# Patient Record
Sex: FEMALE | Race: BLACK OR AFRICAN AMERICAN | NOT HISPANIC OR LATINO | Employment: FULL TIME | URBAN - METROPOLITAN AREA
[De-identification: names, ages, dates, MRNs, and addresses within clinical notes are randomized per-mention and may not be internally consistent; named-entity substitution may affect disease eponyms.]

---

## 2017-12-18 RX ORDER — SPIRONOLACTONE 25 MG/1
TABLET ORAL
Qty: 90 TABLET | Refills: 3 | OUTPATIENT
Start: 2017-12-18

## 2017-12-18 RX ORDER — CARVEDILOL 25 MG/1
TABLET ORAL
Qty: 180 TABLET | Refills: 3 | OUTPATIENT
Start: 2017-12-18

## 2018-03-08 ENCOUNTER — OFFICE VISIT (OUTPATIENT)
Dept: FAMILY MEDICINE CLINIC | Facility: CLINIC | Age: 53
End: 2018-03-08

## 2018-03-08 VITALS
HEIGHT: 64 IN | DIASTOLIC BLOOD PRESSURE: 70 MMHG | WEIGHT: 201 LBS | TEMPERATURE: 98.2 F | HEART RATE: 77 BPM | BODY MASS INDEX: 34.31 KG/M2 | OXYGEN SATURATION: 100 % | SYSTOLIC BLOOD PRESSURE: 120 MMHG

## 2018-03-08 DIAGNOSIS — Z79.899 HIGH RISK MEDICATION USE: Primary | ICD-10-CM

## 2018-03-08 DIAGNOSIS — E66.9 OBESITY (BMI 30-39.9): ICD-10-CM

## 2018-03-08 DIAGNOSIS — D64.9 ANEMIA, UNSPECIFIED TYPE: ICD-10-CM

## 2018-03-08 DIAGNOSIS — H26.9 CATARACT OF BOTH EYES, UNSPECIFIED CATARACT TYPE: ICD-10-CM

## 2018-03-08 DIAGNOSIS — Z02.9 ADMINISTRATIVE ENCOUNTER: ICD-10-CM

## 2018-03-08 DIAGNOSIS — Z00.00 HEALTH CARE MAINTENANCE: ICD-10-CM

## 2018-03-08 DIAGNOSIS — I10 ESSENTIAL HYPERTENSION: ICD-10-CM

## 2018-03-08 PROBLEM — M13.0 HYPERTROPHIC POLYARTHRITIS: Status: ACTIVE | Noted: 2018-03-08

## 2018-03-08 PROBLEM — R92.8 ABNORMAL MAMMOGRAM: Status: ACTIVE | Noted: 2018-03-08

## 2018-03-08 PROBLEM — E55.9 VITAMIN D DEFICIENCY: Status: ACTIVE | Noted: 2018-03-08

## 2018-03-08 PROBLEM — M54.30 NEURALGIA NEURITIS, SCIATIC NERVE: Status: ACTIVE | Noted: 2018-03-08

## 2018-03-08 PROBLEM — R63.5 WEIGHT GAIN: Status: ACTIVE | Noted: 2018-03-08

## 2018-03-08 PROCEDURE — 99214 OFFICE O/P EST MOD 30 MIN: CPT | Performed by: FAMILY MEDICINE

## 2018-03-08 RX ORDER — SPIRONOLACTONE 25 MG/1
25 TABLET ORAL DAILY
Qty: 90 TABLET | Refills: 3 | Status: SHIPPED | OUTPATIENT
Start: 2018-03-08 | End: 2019-02-18 | Stop reason: SDUPTHER

## 2018-03-08 RX ORDER — CARVEDILOL 25 MG/1
25 TABLET ORAL 2 TIMES DAILY WITH MEALS
Qty: 180 TABLET | Refills: 3 | Status: SHIPPED | OUTPATIENT
Start: 2018-03-08 | End: 2019-02-18 | Stop reason: SDUPTHER

## 2018-03-08 NOTE — PROGRESS NOTES
HPI  Bebe Kiser is a 53 y.o. female who is here for follow up of hypertension and to her new medications.  Had been under a lot of stress taking care of 2 foster children which are no longer under her care and stress levels have significantly improved.  Also here to have forms completed to continue foster care.  Is past due for Pap smear and mammography but goes to [facility] on her own for mammograms and will contact her previous gynecologist.  Had run out of blood pressure medication but her daughter is also on Aldactone.      Review of Systems   Eyes: Positive for visual disturbance.        Patient has noted some vision problems especially at night including rings around lights etc.   All other systems reviewed and are negative.        Past Medical History:   Diagnosis Date   • Hypertension        No past surgical history on file.    No family history on file.    Social History     Social History   • Marital status:      Spouse name: N/A   • Number of children: N/A   • Years of education: N/A     Occupational History   • Not on file.     Social History Main Topics   • Smoking status: Never Smoker   • Smokeless tobacco: Never Used   • Alcohol use Yes      Comment: OCCASIONAL   • Drug use: No   • Sexual activity: Yes     Partners: Male     Other Topics Concern   • Not on file     Social History Narrative         Physical Exam   Constitutional: She is oriented to person, place, and time. She appears well-developed and well-nourished. No distress.   HENT:   Head: Normocephalic.   Right Ear: Hearing, tympanic membrane and ear canal normal.   Left Ear: Hearing, tympanic membrane and ear canal normal.   Mouth/Throat: Oropharynx is clear and moist.   Eyes: Conjunctivae and EOM are normal. Pupils are equal, round, and reactive to light.   Clouding of both lenses noted   Neck: Normal range of motion. Neck supple. No thyromegaly present.   Cardiovascular: Normal rate, regular rhythm and normal heart  sounds.    Pulmonary/Chest: Effort normal and breath sounds normal.   Abdominal: Soft. She exhibits no distension and no mass. There is no tenderness.   Musculoskeletal: Normal range of motion. She exhibits no edema or deformity.   Neurological: She is alert and oriented to person, place, and time. She exhibits normal muscle tone. Coordination normal.   Skin: Skin is warm and dry.   Psychiatric: She has a normal mood and affect. Her behavior is normal. Judgment and thought content normal.   Nursing note and vitals reviewed.        Assessment/Plan    Bebe was seen today for annual exam and hypertension.    Diagnoses and all orders for this visit:    High risk medication use  -     Comprehensive Metabolic Panel    Health care maintenance  -     Lipid Panel    Essential hypertension  -     Comprehensive Metabolic Panel  -     spironolactone (ALDACTONE) 25 MG tablet; Take 1 tablet by mouth Daily.  -     carvedilol (COREG) 25 MG tablet; Take 1 tablet by mouth 2 (Two) Times a Day With Meals.    Anemia, unspecified type  -     CBC & Differential    Obesity (BMI 30-39.9)    Cataract of both eyes, unspecified cataract type  -     Ambulatory Referral to Ophthalmology    Administrative encounter      Patient is here for annual follow-up of hypertension and also to have forms completed to continue foster care.  Overall seems to be doing extremely well on current regimen.  Examination does indicate bilateral cataracts and patient has noted increasing difficulty especially with night vision.  Do recommend referral to ophthalmologist as discussed.  Also is due for routine Pap smear and mammography and will contact her previous gynecologist etc.  Encouraged healthy diet and weight loss.  Otherwise continue current therapy including annual follow-up visits.    This note includes information entered using a voice recognition dictation system.  Though reviewed, some nonsensible errors may remain.

## 2018-03-09 LAB
ALBUMIN SERPL-MCNC: 4.6 G/DL (ref 3.5–5.2)
ALBUMIN/GLOB SERPL: 1.7 G/DL
ALP SERPL-CCNC: 49 U/L (ref 39–117)
ALT SERPL-CCNC: 15 U/L (ref 1–33)
AST SERPL-CCNC: 17 U/L (ref 1–32)
BASOPHILS # BLD AUTO: 0.02 10*3/MM3 (ref 0–0.2)
BASOPHILS NFR BLD AUTO: 0.2 % (ref 0–1.5)
BILIRUB SERPL-MCNC: 0.3 MG/DL (ref 0.1–1.2)
BUN SERPL-MCNC: 19 MG/DL (ref 6–20)
BUN/CREAT SERPL: 22.6 (ref 7–25)
CALCIUM SERPL-MCNC: 9.7 MG/DL (ref 8.6–10.5)
CHLORIDE SERPL-SCNC: 100 MMOL/L (ref 98–107)
CHOLEST SERPL-MCNC: 214 MG/DL (ref 0–200)
CO2 SERPL-SCNC: 27 MMOL/L (ref 22–29)
CREAT SERPL-MCNC: 0.84 MG/DL (ref 0.57–1)
EOSINOPHIL # BLD AUTO: 0.27 10*3/MM3 (ref 0–0.7)
EOSINOPHIL NFR BLD AUTO: 3.2 % (ref 0.3–6.2)
ERYTHROCYTE [DISTWIDTH] IN BLOOD BY AUTOMATED COUNT: 13.8 % (ref 11.7–13)
GFR SERPLBLD CREATININE-BSD FMLA CKD-EPI: 70 ML/MIN/1.73
GFR SERPLBLD CREATININE-BSD FMLA CKD-EPI: 85 ML/MIN/1.73
GLOBULIN SER CALC-MCNC: 2.7 GM/DL
GLUCOSE SERPL-MCNC: 102 MG/DL (ref 65–99)
HCT VFR BLD AUTO: 35.9 % (ref 35.6–45.5)
HDLC SERPL-MCNC: 68 MG/DL (ref 40–60)
HGB BLD-MCNC: 11.4 G/DL (ref 11.9–15.5)
IMM GRANULOCYTES # BLD: 0.02 10*3/MM3 (ref 0–0.03)
IMM GRANULOCYTES NFR BLD: 0.2 % (ref 0–0.5)
INTERPRETATION: NORMAL
LDLC SERPL CALC-MCNC: 129 MG/DL (ref 0–100)
LYMPHOCYTES # BLD AUTO: 2.02 10*3/MM3 (ref 0.9–4.8)
LYMPHOCYTES NFR BLD AUTO: 23.8 % (ref 19.6–45.3)
MCH RBC QN AUTO: 30.6 PG (ref 26.9–32)
MCHC RBC AUTO-ENTMCNC: 31.8 G/DL (ref 32.4–36.3)
MCV RBC AUTO: 96.2 FL (ref 80.5–98.2)
MONOCYTES # BLD AUTO: 0.65 10*3/MM3 (ref 0.2–1.2)
MONOCYTES NFR BLD AUTO: 7.7 % (ref 5–12)
NEUTROPHILS # BLD AUTO: 5.5 10*3/MM3 (ref 1.9–8.1)
NEUTROPHILS NFR BLD AUTO: 64.9 % (ref 42.7–76)
PLATELET # BLD AUTO: 277 10*3/MM3 (ref 140–500)
POTASSIUM SERPL-SCNC: 4.9 MMOL/L (ref 3.5–5.2)
PROT SERPL-MCNC: 7.3 G/DL (ref 6–8.5)
RBC # BLD AUTO: 3.73 10*6/MM3 (ref 3.9–5.2)
SODIUM SERPL-SCNC: 138 MMOL/L (ref 136–145)
TRIGL SERPL-MCNC: 87 MG/DL (ref 0–150)
VLDLC SERPL CALC-MCNC: 17.4 MG/DL (ref 5–40)
WBC # BLD AUTO: 8.48 10*3/MM3 (ref 4.5–10.7)

## 2019-02-15 ENCOUNTER — OFFICE VISIT (OUTPATIENT)
Dept: FAMILY MEDICINE CLINIC | Facility: CLINIC | Age: 54
End: 2019-02-15

## 2019-02-15 VITALS
HEART RATE: 68 BPM | HEIGHT: 64 IN | OXYGEN SATURATION: 96 % | WEIGHT: 204 LBS | RESPIRATION RATE: 16 BRPM | TEMPERATURE: 98.2 F | BODY MASS INDEX: 34.83 KG/M2

## 2019-02-15 DIAGNOSIS — Z79.899 HIGH RISK MEDICATION USE: ICD-10-CM

## 2019-02-15 DIAGNOSIS — E78.5 HYPERLIPIDEMIA, UNSPECIFIED HYPERLIPIDEMIA TYPE: ICD-10-CM

## 2019-02-15 DIAGNOSIS — I10 ESSENTIAL HYPERTENSION: Primary | ICD-10-CM

## 2019-02-15 DIAGNOSIS — E55.9 VITAMIN D DEFICIENCY: ICD-10-CM

## 2019-02-15 DIAGNOSIS — D64.9 ANEMIA, UNSPECIFIED TYPE: ICD-10-CM

## 2019-02-15 PROCEDURE — 99213 OFFICE O/P EST LOW 20 MIN: CPT | Performed by: FAMILY MEDICINE

## 2019-02-15 NOTE — PROGRESS NOTES
BRYNN Kiser is a 54 y.o. female who is here for follow up of hypertension and general medical follow-up.  Did have recent cold with congestion but seems to be improving.  No new complaints      Review of Systems   Constitutional: Negative for unexpected weight change.   Respiratory: Positive for cough.    Neurological: Positive for headaches.   All other systems reviewed and are negative.        Past Medical History:   Diagnosis Date   • Hypertension        History reviewed. No pertinent surgical history.    History reviewed. No pertinent family history.    Social History     Socioeconomic History   • Marital status:      Spouse name: Not on file   • Number of children: Not on file   • Years of education: Not on file   • Highest education level: Not on file   Social Needs   • Financial resource strain: Not on file   • Food insecurity - worry: Not on file   • Food insecurity - inability: Not on file   • Transportation needs - medical: Not on file   • Transportation needs - non-medical: Not on file   Occupational History   • Not on file   Tobacco Use   • Smoking status: Never Smoker   • Smokeless tobacco: Never Used   Substance and Sexual Activity   • Alcohol use: Yes     Comment: OCCASIONAL   • Drug use: No   • Sexual activity: Yes     Partners: Male   Other Topics Concern   • Not on file   Social History Narrative   • Not on file         Physical Exam   Constitutional: She appears well-developed and well-nourished. No distress.   HENT:   Head: Normocephalic.   Right Ear: Tympanic membrane and ear canal normal.   Left Ear: Tympanic membrane and ear canal normal.   Nose: Nose normal.   Mouth/Throat: Oropharynx is clear and moist.   Eyes: EOM are normal. Pupils are equal, round, and reactive to light.   Neck: Normal range of motion. No thyromegaly present.   Cardiovascular: Normal rate, regular rhythm and normal heart sounds.   Pulmonary/Chest: Effort normal and breath sounds normal. She has no wheezes.  She has no rales.   Abdominal: Soft. She exhibits no distension and no mass. There is no tenderness.   Musculoskeletal: She exhibits no edema or deformity.   Lymphadenopathy:     She has no cervical adenopathy.   Neurological: She is alert. She exhibits normal muscle tone. Coordination normal.   Skin: Skin is warm and dry.   Psychiatric: She has a normal mood and affect. Her behavior is normal. Judgment and thought content normal.   Nursing note and vitals reviewed.        Assessment/Plan    Bebe was seen today for hypertension.    Diagnoses and all orders for this visit:    Essential hypertension  -     Comprehensive Metabolic Panel    Anemia, unspecified type  -     CBC & Differential    Vitamin D deficiency  -     Vitamin D 1,25 Dihydroxy    High risk medication use  -     Comprehensive Metabolic Panel    Hyperlipidemia, unspecified hyperlipidemia type  -     Lipid Panel      Patient is here for routine follow-up of above-noted medical problems.  Had recent upper respiratory infection what seems to be resolving with symptomatic therapy.  He is fasting for lab today.  Has been having some headaches she thinks related to recent cold.  Recommend continued symptomatic therapy and call if symptoms worsen or do not resolve.  Admits has not seen gynecologist for some time and strongly recommend GYN referral for routine pelvic exam mammograms etc.  History of vitamin D deficiency but says she has not been taking supplement and level will be rechecked.  Otherwise continue current therapy and follow-up in 6 months.    This note includes information entered using a voice recognition dictation system.  Though reviewed, some nonsensible errors may remain.

## 2019-02-18 DIAGNOSIS — I10 ESSENTIAL HYPERTENSION: ICD-10-CM

## 2019-02-18 RX ORDER — CARVEDILOL 25 MG/1
25 TABLET ORAL 2 TIMES DAILY WITH MEALS
Qty: 180 TABLET | Refills: 1 | Status: SHIPPED | OUTPATIENT
Start: 2019-02-18 | End: 2019-08-16 | Stop reason: SDUPTHER

## 2019-02-18 RX ORDER — SPIRONOLACTONE 25 MG/1
25 TABLET ORAL DAILY
Qty: 90 TABLET | Refills: 1 | Status: SHIPPED | OUTPATIENT
Start: 2019-02-18 | End: 2019-08-16 | Stop reason: SDUPTHER

## 2019-02-19 LAB
1,25(OH)2D3 SERPL-MCNC: 49.9 PG/ML (ref 19.9–79.3)
ALBUMIN SERPL-MCNC: 4.5 G/DL (ref 3.5–5.5)
ALBUMIN/GLOB SERPL: 1.8 {RATIO} (ref 1.2–2.2)
ALP SERPL-CCNC: 52 IU/L (ref 39–117)
ALT SERPL-CCNC: 15 IU/L (ref 0–32)
AST SERPL-CCNC: 20 IU/L (ref 0–40)
BASOPHILS # BLD AUTO: 0 X10E3/UL (ref 0–0.2)
BASOPHILS NFR BLD AUTO: 0 %
BILIRUB SERPL-MCNC: 0.3 MG/DL (ref 0–1.2)
BUN SERPL-MCNC: 9 MG/DL (ref 6–24)
BUN/CREAT SERPL: 12 (ref 9–23)
CALCIUM SERPL-MCNC: 9.6 MG/DL (ref 8.7–10.2)
CHLORIDE SERPL-SCNC: 99 MMOL/L (ref 96–106)
CHOLEST SERPL-MCNC: 183 MG/DL (ref 100–199)
CO2 SERPL-SCNC: 25 MMOL/L (ref 20–29)
CREAT SERPL-MCNC: 0.75 MG/DL (ref 0.57–1)
EOSINOPHIL # BLD AUTO: 0.4 X10E3/UL (ref 0–0.4)
EOSINOPHIL NFR BLD AUTO: 4 %
ERYTHROCYTE [DISTWIDTH] IN BLOOD BY AUTOMATED COUNT: 13.3 % (ref 12.3–15.4)
GLOBULIN SER CALC-MCNC: 2.5 G/DL (ref 1.5–4.5)
GLUCOSE SERPL-MCNC: 91 MG/DL (ref 65–99)
HCT VFR BLD AUTO: 34.9 % (ref 34–46.6)
HDLC SERPL-MCNC: 58 MG/DL
HGB BLD-MCNC: 11.4 G/DL (ref 11.1–15.9)
IMM GRANULOCYTES # BLD AUTO: 0 X10E3/UL (ref 0–0.1)
IMM GRANULOCYTES NFR BLD AUTO: 0 %
LDLC SERPL CALC-MCNC: 107 MG/DL (ref 0–99)
LYMPHOCYTES # BLD AUTO: 2.3 X10E3/UL (ref 0.7–3.1)
LYMPHOCYTES NFR BLD AUTO: 22 %
MCH RBC QN AUTO: 30.6 PG (ref 26.6–33)
MCHC RBC AUTO-ENTMCNC: 32.7 G/DL (ref 31.5–35.7)
MCV RBC AUTO: 94 FL (ref 79–97)
MONOCYTES # BLD AUTO: 0.8 X10E3/UL (ref 0.1–0.9)
MONOCYTES NFR BLD AUTO: 8 %
NEUTROPHILS # BLD AUTO: 6.7 X10E3/UL (ref 1.4–7)
NEUTROPHILS NFR BLD AUTO: 66 %
PLATELET # BLD AUTO: 314 X10E3/UL (ref 150–379)
POTASSIUM SERPL-SCNC: 4.6 MMOL/L (ref 3.5–5.2)
PROT SERPL-MCNC: 7 G/DL (ref 6–8.5)
RBC # BLD AUTO: 3.72 X10E6/UL (ref 3.77–5.28)
SODIUM SERPL-SCNC: 139 MMOL/L (ref 134–144)
TRIGL SERPL-MCNC: 88 MG/DL (ref 0–149)
VLDLC SERPL CALC-MCNC: 18 MG/DL (ref 5–40)
WBC # BLD AUTO: 10.2 X10E3/UL (ref 3.4–10.8)

## 2019-08-16 DIAGNOSIS — I10 ESSENTIAL HYPERTENSION: ICD-10-CM

## 2019-08-16 RX ORDER — CARVEDILOL 25 MG/1
25 TABLET ORAL 2 TIMES DAILY WITH MEALS
Qty: 180 TABLET | Refills: 0 | Status: SHIPPED | OUTPATIENT
Start: 2019-08-16 | End: 2020-03-23 | Stop reason: SDUPTHER

## 2019-08-16 RX ORDER — SPIRONOLACTONE 25 MG/1
25 TABLET ORAL DAILY
Qty: 90 TABLET | Refills: 0 | Status: SHIPPED | OUTPATIENT
Start: 2019-08-16 | End: 2020-03-23 | Stop reason: SDUPTHER

## 2019-11-12 DIAGNOSIS — I10 ESSENTIAL HYPERTENSION: ICD-10-CM

## 2019-11-12 RX ORDER — CARVEDILOL 25 MG/1
25 TABLET ORAL 2 TIMES DAILY WITH MEALS
Qty: 180 TABLET | Refills: 0 | OUTPATIENT
Start: 2019-11-12

## 2019-11-12 RX ORDER — SPIRONOLACTONE 25 MG/1
25 TABLET ORAL DAILY
Qty: 90 TABLET | Refills: 0 | OUTPATIENT
Start: 2019-11-12

## 2020-03-18 ENCOUNTER — OFFICE VISIT (OUTPATIENT)
Dept: FAMILY MEDICINE CLINIC | Facility: CLINIC | Age: 55
End: 2020-03-18

## 2020-03-18 VITALS
RESPIRATION RATE: 20 BRPM | HEART RATE: 71 BPM | OXYGEN SATURATION: 99 % | DIASTOLIC BLOOD PRESSURE: 88 MMHG | SYSTOLIC BLOOD PRESSURE: 120 MMHG | BODY MASS INDEX: 35.17 KG/M2 | TEMPERATURE: 98.1 F | WEIGHT: 206 LBS | HEIGHT: 64 IN

## 2020-03-18 DIAGNOSIS — Z79.899 HIGH RISK MEDICATION USE: ICD-10-CM

## 2020-03-18 DIAGNOSIS — E78.5 HYPERLIPIDEMIA, UNSPECIFIED HYPERLIPIDEMIA TYPE: ICD-10-CM

## 2020-03-18 DIAGNOSIS — D64.9 ANEMIA, UNSPECIFIED TYPE: ICD-10-CM

## 2020-03-18 DIAGNOSIS — E66.9 OBESITY (BMI 30-39.9): ICD-10-CM

## 2020-03-18 DIAGNOSIS — R35.0 FREQUENCY OF URINATION: ICD-10-CM

## 2020-03-18 DIAGNOSIS — I10 ESSENTIAL HYPERTENSION: Primary | ICD-10-CM

## 2020-03-18 LAB
ALBUMIN SERPL-MCNC: 4.2 G/DL (ref 3.5–5.2)
ALBUMIN/GLOB SERPL: 1.6 G/DL
ALP SERPL-CCNC: 50 U/L (ref 39–117)
ALT SERPL-CCNC: 17 U/L (ref 1–33)
AST SERPL-CCNC: 14 U/L (ref 1–32)
BASOPHILS # BLD AUTO: 0.05 10*3/MM3 (ref 0–0.2)
BASOPHILS NFR BLD AUTO: 0.6 % (ref 0–1.5)
BILIRUB BLD-MCNC: NEGATIVE MG/DL
BILIRUB SERPL-MCNC: 0.3 MG/DL (ref 0.2–1.2)
BUN SERPL-MCNC: 17 MG/DL (ref 6–20)
BUN/CREAT SERPL: 21.5 (ref 7–25)
CALCIUM SERPL-MCNC: 9.3 MG/DL (ref 8.6–10.5)
CHLORIDE SERPL-SCNC: 99 MMOL/L (ref 98–107)
CHOLEST SERPL-MCNC: 213 MG/DL (ref 0–200)
CLARITY, POC: CLEAR
CO2 SERPL-SCNC: 26 MMOL/L (ref 22–29)
COLOR UR: YELLOW
CREAT SERPL-MCNC: 0.79 MG/DL (ref 0.57–1)
EOSINOPHIL # BLD AUTO: 0.28 10*3/MM3 (ref 0–0.4)
EOSINOPHIL NFR BLD AUTO: 3.5 % (ref 0.3–6.2)
ERYTHROCYTE [DISTWIDTH] IN BLOOD BY AUTOMATED COUNT: 12.4 % (ref 12.3–15.4)
GLOBULIN SER CALC-MCNC: 2.6 GM/DL
GLUCOSE SERPL-MCNC: 116 MG/DL (ref 65–99)
GLUCOSE UR STRIP-MCNC: NEGATIVE MG/DL
HCT VFR BLD AUTO: 33.7 % (ref 34–46.6)
HDLC SERPL-MCNC: 60 MG/DL (ref 40–60)
HGB BLD-MCNC: 11 G/DL (ref 12–15.9)
IMM GRANULOCYTES # BLD AUTO: 0.03 10*3/MM3 (ref 0–0.05)
IMM GRANULOCYTES NFR BLD AUTO: 0.4 % (ref 0–0.5)
KETONES UR QL: NEGATIVE
LDLC SERPL CALC-MCNC: 126 MG/DL (ref 0–100)
LEUKOCYTE EST, POC: NEGATIVE
LYMPHOCYTES # BLD AUTO: 2.12 10*3/MM3 (ref 0.7–3.1)
LYMPHOCYTES NFR BLD AUTO: 26.2 % (ref 19.6–45.3)
MCH RBC QN AUTO: 30.9 PG (ref 26.6–33)
MCHC RBC AUTO-ENTMCNC: 32.6 G/DL (ref 31.5–35.7)
MCV RBC AUTO: 94.7 FL (ref 79–97)
MONOCYTES # BLD AUTO: 0.77 10*3/MM3 (ref 0.1–0.9)
MONOCYTES NFR BLD AUTO: 9.5 % (ref 5–12)
NEUTROPHILS # BLD AUTO: 4.83 10*3/MM3 (ref 1.7–7)
NEUTROPHILS NFR BLD AUTO: 59.8 % (ref 42.7–76)
NITRITE UR-MCNC: NEGATIVE MG/ML
NRBC BLD AUTO-RTO: 0 /100 WBC (ref 0–0.2)
PH UR: 6 [PH] (ref 5–8)
PLATELET # BLD AUTO: 269 10*3/MM3 (ref 140–450)
POTASSIUM SERPL-SCNC: 4.5 MMOL/L (ref 3.5–5.2)
PROT SERPL-MCNC: 6.8 G/DL (ref 6–8.5)
PROT UR STRIP-MCNC: NEGATIVE MG/DL
RBC # BLD AUTO: 3.56 10*6/MM3 (ref 3.77–5.28)
RBC # UR STRIP: ABNORMAL /UL
SODIUM SERPL-SCNC: 137 MMOL/L (ref 136–145)
SP GR UR: 1.02 (ref 1–1.03)
TRIGL SERPL-MCNC: 137 MG/DL (ref 0–150)
UROBILINOGEN UR QL: NORMAL
VLDLC SERPL CALC-MCNC: 27.4 MG/DL
WBC # BLD AUTO: 8.08 10*3/MM3 (ref 3.4–10.8)

## 2020-03-18 PROCEDURE — 81003 URINALYSIS AUTO W/O SCOPE: CPT | Performed by: FAMILY MEDICINE

## 2020-03-18 PROCEDURE — 99213 OFFICE O/P EST LOW 20 MIN: CPT | Performed by: FAMILY MEDICINE

## 2020-03-18 NOTE — PROGRESS NOTES
BRYNN Kiser is a 55 y.o. female who is here for follow up of hypertension and other medical issues.  Overall doing well with no new complaints.  To 16-year-old sons at home with pandemic issues etc.  Blood pressures have been excellent at home.  In fact a little low 1 time when he was having some dizziness.    Review of Systems   Constitutional: Negative for unexpected weight change.   Genitourinary: Positive for frequency. Negative for difficulty urinating and urgency.   Musculoskeletal: Negative for arthralgias.   All other systems reviewed and are negative.        Past Medical History:   Diagnosis Date   • Hypertension        No past surgical history on file.    No family history on file.    Social History     Socioeconomic History   • Marital status:      Spouse name: Not on file   • Number of children: Not on file   • Years of education: Not on file   • Highest education level: Not on file   Tobacco Use   • Smoking status: Never Smoker   • Smokeless tobacco: Never Used   Substance and Sexual Activity   • Alcohol use: Yes     Comment: OCCASIONAL   • Drug use: No   • Sexual activity: Yes     Partners: Male         Physical Exam   Constitutional: She is oriented to person, place, and time. She appears well-developed and well-nourished. No distress.   HENT:   Head: Normocephalic and atraumatic.   Nose: Nose normal.   Mouth/Throat: Oropharynx is clear and moist.   Eyes: Pupils are equal, round, and reactive to light. Conjunctivae and EOM are normal.   Neck: Normal range of motion. Neck supple.   Cardiovascular: Normal rate, regular rhythm and normal heart sounds.   Pulmonary/Chest: Effort normal and breath sounds normal.   Abdominal: Soft. She exhibits no distension.   Musculoskeletal: Normal range of motion. She exhibits no edema or deformity.   Lymphadenopathy:     She has no cervical adenopathy.   Neurological: She is alert and oriented to person, place, and time. She exhibits normal muscle tone.  Coordination normal.   Skin: Skin is warm and dry.   Psychiatric: She has a normal mood and affect. Her behavior is normal. Judgment and thought content normal.   Nursing note and vitals reviewed.        Assessment/Plan    Bebe was seen today for hypertension.    Diagnoses and all orders for this visit:    Essential hypertension  -     Comprehensive Metabolic Panel    Anemia, unspecified type  -     CBC & Differential    High risk medication use  -     CBC & Differential  -     Comprehensive Metabolic Panel    Hyperlipidemia, unspecified hyperlipidemia type  -     Lipid Panel    Obesity (BMI 30-39.9)    Frequency of urination  -     Urinalysis With Microscopic If Indicated (No Culture) - Urine, Clean Catch      Patient here for routine follow-up of above-noted medical issues.  Reports having to get up to urinate at night but mostly because  is getting up.  Overall seems to be doing excellent on current regimen.  Is past due for mammography and will contact [facility] where she usually gets her mammograms.  Also needs to contact her gynecologist for annual Pap smear and pelvic exam etc.  Importance of this was emphasized.  Also immunization updates.  Otherwise continue current therapy and follow-up in 1 year.  Will add urinalysis in view of nocturia complaints.    This note includes information entered using a voice recognition dictation system.  Though reviewed, some nonsensible errors may remain.

## 2020-03-23 ENCOUNTER — TELEPHONE (OUTPATIENT)
Dept: FAMILY MEDICINE CLINIC | Facility: CLINIC | Age: 55
End: 2020-03-23

## 2020-03-23 DIAGNOSIS — I10 ESSENTIAL HYPERTENSION: ICD-10-CM

## 2020-03-23 RX ORDER — CARVEDILOL 25 MG/1
25 TABLET ORAL 2 TIMES DAILY WITH MEALS
Qty: 180 TABLET | Refills: 3 | Status: SHIPPED | OUTPATIENT
Start: 2020-03-23 | End: 2021-04-05

## 2020-03-23 RX ORDER — SPIRONOLACTONE 25 MG/1
25 TABLET ORAL DAILY
Qty: 90 TABLET | Refills: 3 | Status: SHIPPED | OUTPATIENT
Start: 2020-03-23 | End: 2021-04-05

## 2020-03-23 NOTE — TELEPHONE ENCOUNTER
PT WAS SEEN IN THE OFFICE ON Thursday AND NEEDED THESE REFILLED    spironolactone (ALDACTONE) 25 MG tablet  Summary: Take 1 tablet by mouth Daily.    carvedilol (COREG) 25 MG tablet  Summary: Take 1 tablet by mouth 2 (Two) Times a Day With Meals    [facility]/pharmacy #[id number] - [place], [place] - [address] [place] [address] RD. AT [facility] [phone] [facility] [phone] FX

## 2021-04-05 DIAGNOSIS — I10 ESSENTIAL HYPERTENSION: ICD-10-CM

## 2021-04-05 RX ORDER — SPIRONOLACTONE 25 MG/1
TABLET ORAL
Qty: 90 TABLET | Refills: 3 | Status: SHIPPED | OUTPATIENT
Start: 2021-04-05 | End: 2022-04-08

## 2021-04-05 RX ORDER — CARVEDILOL 25 MG/1
TABLET ORAL
Qty: 180 TABLET | Refills: 3 | Status: SHIPPED | OUTPATIENT
Start: 2021-04-05 | End: 2022-04-08

## 2021-04-09 ENCOUNTER — OFFICE VISIT (OUTPATIENT)
Dept: FAMILY MEDICINE CLINIC | Facility: CLINIC | Age: 56
End: 2021-04-09

## 2021-04-09 VITALS
SYSTOLIC BLOOD PRESSURE: 110 MMHG | RESPIRATION RATE: 18 BRPM | HEIGHT: 64 IN | OXYGEN SATURATION: 97 % | TEMPERATURE: 98.7 F | WEIGHT: 216.4 LBS | HEART RATE: 83 BPM | DIASTOLIC BLOOD PRESSURE: 70 MMHG | BODY MASS INDEX: 36.95 KG/M2

## 2021-04-09 DIAGNOSIS — E66.9 OBESITY (BMI 30-39.9): ICD-10-CM

## 2021-04-09 DIAGNOSIS — E55.9 VITAMIN D DEFICIENCY: ICD-10-CM

## 2021-04-09 DIAGNOSIS — E78.5 HYPERLIPIDEMIA, UNSPECIFIED HYPERLIPIDEMIA TYPE: ICD-10-CM

## 2021-04-09 DIAGNOSIS — D64.9 ANEMIA, UNSPECIFIED TYPE: ICD-10-CM

## 2021-04-09 DIAGNOSIS — Z79.899 HIGH RISK MEDICATION USE: ICD-10-CM

## 2021-04-09 DIAGNOSIS — I10 ESSENTIAL HYPERTENSION: Primary | ICD-10-CM

## 2021-04-09 PROCEDURE — 99213 OFFICE O/P EST LOW 20 MIN: CPT | Performed by: FAMILY MEDICINE

## 2021-04-09 NOTE — PROGRESS NOTES
BRYNN Kiser is a 56 y.o. female who is here for follow up hypertension.  Reports feeling very well with no complaints.  Has been off for for 1 year because of pandemic but has returned and again is feeling much better.  Only complaint is weight gain.  Remains on blood pressure medication which seems to be under excellent control.      Review of Systems   Constitutional: Positive for unexpected weight change.   All other systems reviewed and are negative.        Past Medical History:   Diagnosis Date   • Hypertension        No past surgical history on file.    No family history on file.    Social History     Socioeconomic History   • Marital status:      Spouse name: Not on file   • Number of children: Not on file   • Years of education: Not on file   • Highest education level: Not on file   Tobacco Use   • Smoking status: Never Smoker   • Smokeless tobacco: Never Used   Substance and Sexual Activity   • Alcohol use: Yes     Comment: OCCASIONAL   • Drug use: No   • Sexual activity: Yes     Partners: Male       Vitals:    04/09/21 0822   BP: 110/70   Pulse:    Resp:    Temp:    SpO2:         Body mass index is 37.14 kg/m².      Physical Exam  Vitals and nursing note reviewed.   Constitutional:       General: She is not in acute distress.     Appearance: Normal appearance. She is well-developed.   HENT:      Head: Normocephalic and atraumatic.   Eyes:      Extraocular Movements: Extraocular movements intact.      Conjunctiva/sclera: Conjunctivae normal.      Pupils: Pupils are equal, round, and reactive to light.   Neck:      Thyroid: No thyromegaly.   Cardiovascular:      Rate and Rhythm: Normal rate and regular rhythm.      Heart sounds: Normal heart sounds.   Pulmonary:      Effort: Pulmonary effort is normal. No respiratory distress.      Breath sounds: Normal breath sounds.   Abdominal:      General: Abdomen is flat. There is no distension.      Palpations: There is no mass.      Tenderness: There is  no abdominal tenderness.      Hernia: No hernia is present.   Musculoskeletal:         General: No tenderness or deformity. Normal range of motion.      Cervical back: Normal range of motion.   Lymphadenopathy:      Cervical: No cervical adenopathy.   Skin:     General: Skin is warm and dry.      Coloration: Skin is not pale.      Findings: No rash.   Neurological:      General: No focal deficit present.      Mental Status: She is alert and oriented to person, place, and time.      Motor: No abnormal muscle tone.      Coordination: Coordination normal.   Psychiatric:         Mood and Affect: Mood normal.         Behavior: Behavior normal.         Thought Content: Thought content normal.         Judgment: Judgment normal.           Assessment/Plan    Diagnoses and all orders for this visit:    1. Essential hypertension (Primary)  -     Comprehensive Metabolic Panel    2. Anemia, unspecified type  -     CBC & Differential    3. Obesity (BMI 30-39.9)    4. Vitamin D deficiency  -     Vitamin D 1,25 Dihydroxy    5. Hyperlipidemia, unspecified hyperlipidemia type  -     Lipid Panel    6. High risk medication use  -     CBC & Differential  -     Comprehensive Metabolic Panel        Patient here for follow-up of hypertension and routine lab work as noted above.  Patient is past due for mammogram and Pap smear and this was discussed and strongly encouraged.  Patient will contact [facility] for routine mammography and also contact her gynecologist for follow-up pelvic exam.  Otherwise seems to be doing excellent on current regimen which will be continued including follow-up in 6 months.    This note includes information entered using a voice recognition dictation system.  Though reviewed, some nonsensible errors may remain.

## 2021-04-11 LAB
1,25(OH)2D SERPL-MCNC: 68.1 PG/ML (ref 19.9–79.3)
ALBUMIN SERPL-MCNC: 4.4 G/DL (ref 3.5–5.2)
ALBUMIN/GLOB SERPL: 1.8 G/DL
ALP SERPL-CCNC: 63 U/L (ref 39–117)
ALT SERPL-CCNC: 18 U/L (ref 1–33)
AST SERPL-CCNC: 20 U/L (ref 1–32)
BASOPHILS # BLD AUTO: 0.05 10*3/MM3 (ref 0–0.2)
BASOPHILS NFR BLD AUTO: 0.6 % (ref 0–1.5)
BILIRUB SERPL-MCNC: 0.2 MG/DL (ref 0–1.2)
BUN SERPL-MCNC: 14 MG/DL (ref 6–20)
BUN/CREAT SERPL: 16.7 (ref 7–25)
CALCIUM SERPL-MCNC: 9.7 MG/DL (ref 8.6–10.5)
CHLORIDE SERPL-SCNC: 102 MMOL/L (ref 98–107)
CHOLEST SERPL-MCNC: 184 MG/DL (ref 0–200)
CO2 SERPL-SCNC: 27 MMOL/L (ref 22–29)
CREAT SERPL-MCNC: 0.84 MG/DL (ref 0.57–1)
EOSINOPHIL # BLD AUTO: 0.38 10*3/MM3 (ref 0–0.4)
EOSINOPHIL NFR BLD AUTO: 4.7 % (ref 0.3–6.2)
ERYTHROCYTE [DISTWIDTH] IN BLOOD BY AUTOMATED COUNT: 13.2 % (ref 12.3–15.4)
GLOBULIN SER CALC-MCNC: 2.4 GM/DL
GLUCOSE SERPL-MCNC: 110 MG/DL (ref 65–99)
HCT VFR BLD AUTO: 38.8 % (ref 34–46.6)
HDLC SERPL-MCNC: 60 MG/DL (ref 40–60)
HGB BLD-MCNC: 12.1 G/DL (ref 12–15.9)
IMM GRANULOCYTES # BLD AUTO: 0.03 10*3/MM3 (ref 0–0.05)
IMM GRANULOCYTES NFR BLD AUTO: 0.4 % (ref 0–0.5)
LDLC SERPL CALC-MCNC: 106 MG/DL (ref 0–100)
LYMPHOCYTES # BLD AUTO: 2.1 10*3/MM3 (ref 0.7–3.1)
LYMPHOCYTES NFR BLD AUTO: 25.8 % (ref 19.6–45.3)
MCH RBC QN AUTO: 30.4 PG (ref 26.6–33)
MCHC RBC AUTO-ENTMCNC: 31.2 G/DL (ref 31.5–35.7)
MCV RBC AUTO: 97.5 FL (ref 79–97)
MONOCYTES # BLD AUTO: 0.7 10*3/MM3 (ref 0.1–0.9)
MONOCYTES NFR BLD AUTO: 8.6 % (ref 5–12)
NEUTROPHILS # BLD AUTO: 4.87 10*3/MM3 (ref 1.7–7)
NEUTROPHILS NFR BLD AUTO: 59.9 % (ref 42.7–76)
NRBC BLD AUTO-RTO: 0 /100 WBC (ref 0–0.2)
PLATELET # BLD AUTO: 246 10*3/MM3 (ref 140–450)
POTASSIUM SERPL-SCNC: 4.6 MMOL/L (ref 3.5–5.2)
PROT SERPL-MCNC: 6.8 G/DL (ref 6–8.5)
RBC # BLD AUTO: 3.98 10*6/MM3 (ref 3.77–5.28)
SODIUM SERPL-SCNC: 139 MMOL/L (ref 136–145)
TRIGL SERPL-MCNC: 102 MG/DL (ref 0–150)
VLDLC SERPL CALC-MCNC: 18 MG/DL (ref 5–40)
WBC # BLD AUTO: 8.13 10*3/MM3 (ref 3.4–10.8)

## 2022-04-01 ENCOUNTER — OFFICE VISIT (OUTPATIENT)
Dept: FAMILY MEDICINE CLINIC | Facility: CLINIC | Age: 57
End: 2022-04-01

## 2022-04-01 VITALS
TEMPERATURE: 96.9 F | HEART RATE: 70 BPM | OXYGEN SATURATION: 97 % | WEIGHT: 212.2 LBS | BODY MASS INDEX: 36.23 KG/M2 | RESPIRATION RATE: 20 BRPM | HEIGHT: 64 IN | SYSTOLIC BLOOD PRESSURE: 110 MMHG | DIASTOLIC BLOOD PRESSURE: 70 MMHG

## 2022-04-01 DIAGNOSIS — E78.5 HYPERLIPIDEMIA, UNSPECIFIED HYPERLIPIDEMIA TYPE: ICD-10-CM

## 2022-04-01 DIAGNOSIS — E66.9 OBESITY (BMI 30-39.9): ICD-10-CM

## 2022-04-01 DIAGNOSIS — Z79.899 HIGH RISK MEDICATION USE: ICD-10-CM

## 2022-04-01 DIAGNOSIS — D64.9 ANEMIA, UNSPECIFIED TYPE: ICD-10-CM

## 2022-04-01 DIAGNOSIS — Z01.419 GYNECOLOGIC EXAM NORMAL: ICD-10-CM

## 2022-04-01 DIAGNOSIS — R73.01 FASTING HYPERGLYCEMIA: ICD-10-CM

## 2022-04-01 DIAGNOSIS — I10 PRIMARY HYPERTENSION: Primary | ICD-10-CM

## 2022-04-01 PROCEDURE — 99213 OFFICE O/P EST LOW 20 MIN: CPT | Performed by: FAMILY MEDICINE

## 2022-04-01 RX ORDER — DIPHENOXYLATE HYDROCHLORIDE AND ATROPINE SULFATE 2.5; .025 MG/1; MG/1
1 TABLET ORAL DAILY
COMMUNITY
End: 2022-12-02

## 2022-04-01 NOTE — PROGRESS NOTES
BRYNN Kiser is a 57 y.o. female who is here for follow up especially of hypertension.  Patient here basically for annual visit.  Denies any new complaints and seems to be doing extremely well on current regimen.  Will get routine annual lab work and adjust medications depending on results.      Review of Systems   All other systems reviewed and are negative.        Past Medical History:   Diagnosis Date   • Hypertension        No past surgical history on file.    No family history on file.    Social History     Socioeconomic History   • Marital status:    Tobacco Use   • Smoking status: Never Smoker   • Smokeless tobacco: Never Used   Substance and Sexual Activity   • Alcohol use: Yes     Comment: OCCASIONAL   • Drug use: No   • Sexual activity: Yes     Partners: Male       Vitals:    04/01/22 0848   BP: 110/70   Pulse: 70   Resp: 20   Temp: 96.9 °F (36.1 °C)   SpO2: 97%        Body mass index is 36.42 kg/m².      Physical Exam  Vitals and nursing note reviewed.   Constitutional:       General: She is not in acute distress.     Appearance: She is well-developed.   HENT:      Head: Normocephalic and atraumatic.      Nose:      Comments: Patient with mask.  Provider with mask and shield  Eyes:      Conjunctiva/sclera: Conjunctivae normal.      Pupils: Pupils are equal, round, and reactive to light.   Neck:      Thyroid: No thyromegaly.   Cardiovascular:      Rate and Rhythm: Normal rate and regular rhythm.      Heart sounds: Normal heart sounds.   Pulmonary:      Effort: Pulmonary effort is normal. No respiratory distress.      Breath sounds: Normal breath sounds.   Abdominal:      General: There is no distension.      Palpations: Abdomen is soft. There is no mass.      Tenderness: There is no abdominal tenderness.      Hernia: No hernia is present.   Musculoskeletal:         General: No tenderness or deformity. Normal range of motion.      Cervical back: Normal range of motion.   Lymphadenopathy:       Cervical: No cervical adenopathy.   Skin:     General: Skin is warm and dry.      Coloration: Skin is not pale.      Findings: No rash.   Neurological:      General: No focal deficit present.      Mental Status: She is alert and oriented to person, place, and time.      Motor: No abnormal muscle tone.      Coordination: Coordination normal.   Psychiatric:         Mood and Affect: Mood normal.         Behavior: Behavior normal.         Thought Content: Thought content normal.         Judgment: Judgment normal.           Assessment/Plan    Diagnoses and all orders for this visit:    1. Primary hypertension (Primary)  -     Comprehensive Metabolic Panel  -     Hemoglobin A1c    2. High risk medication use  -     CBC & Differential  -     Comprehensive Metabolic Panel    3. Anemia, unspecified type  -     CBC & Differential    4. Fasting hyperglycemia  -     Hemoglobin A1c    5. Hyperlipidemia, unspecified hyperlipidemia type  -     Lipid Panel    6. Obesity (BMI 30-39.9)      Patient here for annual follow-up visit especially high blood pressure and also will recheck routine lab work.  Overall seems to be doing well on current regimen.  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  Has avoided getting Covid, has received vaccines and boosters.  Did discuss recommendations for annual GYN exam and will order referral.

## 2022-04-02 LAB
ALBUMIN SERPL-MCNC: 4.5 G/DL (ref 3.8–4.9)
ALBUMIN/GLOB SERPL: 1.7 {RATIO} (ref 1.2–2.2)
ALP SERPL-CCNC: 58 IU/L (ref 44–121)
ALT SERPL-CCNC: 19 IU/L (ref 0–32)
AST SERPL-CCNC: 19 IU/L (ref 0–40)
BASOPHILS # BLD AUTO: 0 X10E3/UL (ref 0–0.2)
BASOPHILS NFR BLD AUTO: 0 %
BILIRUB SERPL-MCNC: 0.2 MG/DL (ref 0–1.2)
BUN SERPL-MCNC: 14 MG/DL (ref 6–24)
BUN/CREAT SERPL: 16 (ref 9–23)
CALCIUM SERPL-MCNC: 10 MG/DL (ref 8.7–10.2)
CHLORIDE SERPL-SCNC: 103 MMOL/L (ref 96–106)
CHOLEST SERPL-MCNC: 206 MG/DL (ref 100–199)
CO2 SERPL-SCNC: 25 MMOL/L (ref 20–29)
CREAT SERPL-MCNC: 0.87 MG/DL (ref 0.57–1)
EGFRCR SERPLBLD CKD-EPI 2021: 77 ML/MIN/1.73
EOSINOPHIL # BLD AUTO: 0.3 X10E3/UL (ref 0–0.4)
EOSINOPHIL NFR BLD AUTO: 3 %
ERYTHROCYTE [DISTWIDTH] IN BLOOD BY AUTOMATED COUNT: 11.9 % (ref 11.7–15.4)
GLOBULIN SER CALC-MCNC: 2.6 G/DL (ref 1.5–4.5)
GLUCOSE SERPL-MCNC: 97 MG/DL (ref 65–99)
HBA1C MFR BLD: 5.7 % (ref 4.8–5.6)
HCT VFR BLD AUTO: 37.2 % (ref 34–46.6)
HDLC SERPL-MCNC: 54 MG/DL
HGB BLD-MCNC: 12 G/DL (ref 11.1–15.9)
IMM GRANULOCYTES # BLD AUTO: 0 X10E3/UL (ref 0–0.1)
IMM GRANULOCYTES NFR BLD AUTO: 0 %
LDLC SERPL CALC-MCNC: 140 MG/DL (ref 0–99)
LYMPHOCYTES # BLD AUTO: 1.7 X10E3/UL (ref 0.7–3.1)
LYMPHOCYTES NFR BLD AUTO: 22 %
MCH RBC QN AUTO: 29.9 PG (ref 26.6–33)
MCHC RBC AUTO-ENTMCNC: 32.3 G/DL (ref 31.5–35.7)
MCV RBC AUTO: 93 FL (ref 79–97)
MONOCYTES # BLD AUTO: 0.5 X10E3/UL (ref 0.1–0.9)
MONOCYTES NFR BLD AUTO: 6 %
NEUTROPHILS # BLD AUTO: 5.1 X10E3/UL (ref 1.4–7)
NEUTROPHILS NFR BLD AUTO: 69 %
PLATELET # BLD AUTO: 256 X10E3/UL (ref 150–450)
POTASSIUM SERPL-SCNC: 4.7 MMOL/L (ref 3.5–5.2)
PROT SERPL-MCNC: 7.1 G/DL (ref 6–8.5)
RBC # BLD AUTO: 4.01 X10E6/UL (ref 3.77–5.28)
SODIUM SERPL-SCNC: 140 MMOL/L (ref 134–144)
TRIGL SERPL-MCNC: 69 MG/DL (ref 0–149)
VLDLC SERPL CALC-MCNC: 12 MG/DL (ref 5–40)
WBC # BLD AUTO: 7.5 X10E3/UL (ref 3.4–10.8)

## 2022-04-08 DIAGNOSIS — I10 ESSENTIAL HYPERTENSION: ICD-10-CM

## 2022-04-08 RX ORDER — CARVEDILOL 25 MG/1
TABLET ORAL
Qty: 180 TABLET | Refills: 3 | Status: SHIPPED | OUTPATIENT
Start: 2022-04-08

## 2022-04-08 RX ORDER — SPIRONOLACTONE 25 MG/1
TABLET ORAL
Qty: 90 TABLET | Refills: 3 | Status: SHIPPED | OUTPATIENT
Start: 2022-04-08

## 2022-08-04 ENCOUNTER — PROCEDURE VISIT (OUTPATIENT)
Dept: OBSTETRICS AND GYNECOLOGY | Age: 57
End: 2022-08-04

## 2022-08-04 ENCOUNTER — OFFICE VISIT (OUTPATIENT)
Dept: OBSTETRICS AND GYNECOLOGY | Age: 57
End: 2022-08-04

## 2022-08-04 ENCOUNTER — APPOINTMENT (OUTPATIENT)
Dept: WOMENS IMAGING | Facility: HOSPITAL | Age: 57
End: 2022-08-04

## 2022-08-04 VITALS
WEIGHT: 206.8 LBS | BODY MASS INDEX: 35.3 KG/M2 | HEIGHT: 64 IN | DIASTOLIC BLOOD PRESSURE: 62 MMHG | SYSTOLIC BLOOD PRESSURE: 114 MMHG

## 2022-08-04 DIAGNOSIS — Z12.4 SCREENING FOR CERVICAL CANCER: ICD-10-CM

## 2022-08-04 DIAGNOSIS — Z76.89 ENCOUNTER TO ESTABLISH CARE: ICD-10-CM

## 2022-08-04 DIAGNOSIS — Z12.31 VISIT FOR SCREENING MAMMOGRAM: Primary | ICD-10-CM

## 2022-08-04 DIAGNOSIS — Z01.419 WELL WOMAN EXAM WITH ROUTINE GYNECOLOGICAL EXAM: Primary | ICD-10-CM

## 2022-08-04 PROBLEM — D64.9 ANEMIA: Status: RESOLVED | Noted: 2018-03-08 | Resolved: 2022-08-04

## 2022-08-04 PROBLEM — E66.9 OBESITY (BMI 30-39.9): Status: RESOLVED | Noted: 2020-03-18 | Resolved: 2022-08-04

## 2022-08-04 PROBLEM — R63.5 WEIGHT GAIN: Status: RESOLVED | Noted: 2018-03-08 | Resolved: 2022-08-04

## 2022-08-04 PROBLEM — R92.8 ABNORMAL MAMMOGRAM: Status: RESOLVED | Noted: 2018-03-08 | Resolved: 2022-08-04

## 2022-08-04 PROCEDURE — 77067 SCR MAMMO BI INCL CAD: CPT | Performed by: RADIOLOGY

## 2022-08-04 PROCEDURE — 99386 PREV VISIT NEW AGE 40-64: CPT | Performed by: STUDENT IN AN ORGANIZED HEALTH CARE EDUCATION/TRAINING PROGRAM

## 2022-08-04 PROCEDURE — 77067 SCR MAMMO BI INCL CAD: CPT | Performed by: STUDENT IN AN ORGANIZED HEALTH CARE EDUCATION/TRAINING PROGRAM

## 2022-08-04 PROCEDURE — 77063 BREAST TOMOSYNTHESIS BI: CPT | Performed by: STUDENT IN AN ORGANIZED HEALTH CARE EDUCATION/TRAINING PROGRAM

## 2022-08-04 PROCEDURE — 77063 BREAST TOMOSYNTHESIS BI: CPT | Performed by: RADIOLOGY

## 2022-08-04 NOTE — PROGRESS NOTES
[facility]   Obstetrics and Gynecology   Routine Annual Visit    2022    Patient: Bebe Kiser          MR#:[id number]    History of Present Illness    Chief Complaint   Patient presents with   • Annual Exam   • Establish Care     NGYN - AE and MG today, Last AE 10+ yrs and last MG , Pap smears all normal, Colonoscopy at age 48 was normal, Has not had DEXA scan, No problems       57 y.o. female  who presents for annual exam.  Last annual > 10 yrs ago. Denies h/o abnormal pap, mammo, c-scope.  No menses in >10 yrs.  No PMB or HRT.    Studies reviewed:  Mammo today    Obstetric History:  OB History        2    Para   2    Term   2            AB        Living   2       SAB        IAB        Ectopic        Molar        Multiple        Live Births   2               Menstrual History:     No LMP recorded (lmp unknown). Patient is postmenopausal.       Sexual History:   Sexually active, no issues, declines STD testing      Social History:   Pre-K teacher  Also   Adopted one son    ________________________________________  Patient Active Problem List   Diagnosis   • Hypertension   • Hypertrophic polyarthritis   • Neuralgia neuritis, sciatic nerve   • Vitamin D deficiency   • High risk medication use     Past Medical History:   Diagnosis Date   • Hypertension      Past Surgical History:   Procedure Laterality Date   • CATARACT EXTRACTION Bilateral    • COLONOSCOPY           Social History     Tobacco Use   Smoking Status Never Smoker   Smokeless Tobacco Never Used     Family History   Problem Relation Age of Onset   • No Known Problems Father    • Hypotension Mother    • Breast cancer Neg Hx    • Ovarian cancer Neg Hx    • Uterine cancer Neg Hx    • Colon cancer Neg Hx      Prior to Admission medications    Medication Sig Start Date End Date Taking? Authorizing Provider   Calcium Carbonate (CALCIUM 500 PO) Take 1 tablet by mouth Daily.   Yes Provider,  "MD Anya   carvedilol (COREG) 25 MG tablet TAKE 1 TABLET BY MOUTH TWICE A DAY WITH MEALS 4/8/22  Yes Kevan Nam MD   multivitamin (THERAGRAN) tablet tablet Take 1 tablet by mouth Daily.   Yes ProviderAnya MD   spironolactone (ALDACTONE) 25 MG tablet TAKE 1 TABLET BY MOUTH EVERY DAY 4/8/22  Yes Kevan Nam MD   VITAMIN D PO Take  by mouth Daily.   Yes ProviderAnya MD     ________________________________________    Current contraception: post menopausal status  History of abnormal Pap smear: no  Family history of uterine or ovarian cancer: no  Family History of colon cancer/colon polyps: no  History of abnormal mammogram: no    The following portions of the patient's history were reviewed and updated as appropriate: allergies, current medications, past family history, past medical history, past social history, past surgical history and problem list.    Review of Systems   All other systems reviewed and are negative.           Objective     /62   Ht 162.6 cm (64\")   Wt 93.8 kg (206 lb 12.8 oz)   LMP  (LMP Unknown)   Breastfeeding No   BMI 35.50 kg/m²    BP Readings from Last 3 Encounters:   08/04/22 114/62   04/01/22 110/70   04/09/21 110/70      Wt Readings from Last 3 Encounters:   08/04/22 93.8 kg (206 lb 12.8 oz)   04/01/22 96.3 kg (212 lb 3.2 oz)   04/09/21 98.2 kg (216 lb 6.4 oz)        BMI: Estimated body mass index is 35.5 kg/m² as calculated from the following:    Height as of this encounter: 162.6 cm (64\").    Weight as of this encounter: 93.8 kg (206 lb 12.8 oz).    Physical Exam  Vitals and nursing note reviewed.   Constitutional:       General: She is not in acute distress.     Appearance: Normal appearance.   HENT:      Head: Normocephalic and atraumatic.   Eyes:      Extraocular Movements: Extraocular movements intact.   Cardiovascular:      Rate and Rhythm: Normal rate and regular rhythm.      Pulses: Normal pulses.      Heart sounds: No murmur " heard.  Pulmonary:      Effort: Pulmonary effort is normal. No respiratory distress.      Breath sounds: Normal breath sounds.   Chest:   Breasts:      Right: Normal. No mass, nipple discharge, skin change, tenderness or axillary adenopathy.      Left: Normal. No mass, nipple discharge, skin change, tenderness or axillary adenopathy.       Abdominal:      General: There is no distension.      Palpations: Abdomen is soft. There is no mass.      Tenderness: There is no abdominal tenderness.   Genitourinary:     General: Normal vulva.      Labia:         Right: No rash or lesion.         Left: No rash or lesion.       Urethra: No prolapse, urethral swelling or urethral lesion.      Vagina: Normal.      Cervix: Normal.      Uterus: Normal.       Adnexa: Right adnexa normal and left adnexa normal.      Rectum: Normal.      Comments: Bladder: no masses or tenderness  Perineum/Anus: no masses, lesions, or skin changes  Musculoskeletal:         General: No swelling. Normal range of motion.      Cervical back: Normal range of motion.   Lymphadenopathy:      Upper Body:      Right upper body: No axillary adenopathy.      Left upper body: No axillary adenopathy.   Skin:     General: Skin is warm and dry.   Neurological:      General: No focal deficit present.      Mental Status: She is alert and oriented to person, place, and time.   Psychiatric:         Mood and Affect: Mood normal.         Behavior: Behavior normal.         As part of wellness and prevention, the following topics were discussed with the patient:  Encouraged self breast exam  Physical activity and regular exercised encouraged.   Injury prevention discussed.  Healthy weight discussed.  Nutrition discussed.  Substance abuse/misuse discussed.  Sexual behavior/safe practices discussed.   Sexual transmitted disease prevention   Mental health discussed.           Assessment:  Diagnoses and all orders for this visit:    1. Well woman exam with routine gynecological  exam (Primary)  -     IGP, Apt HPV,rfx 16 / 18,45    2. Encounter to establish care    3. Screening for cervical cancer  -     IGP, Apt HPV,rfx 16 / 18,45      - Breast, pelvic, and rectal exam normal  - Declined STD screen  - Pap today  - Mammogram today  - Plan for repeat colonoscopy next year  - Discussed DEXA scan by 63 years old    Plan:  Return in about 1 year (around 8/4/2023) for Annual w/ mammo.      Tamika Sweeney MD  8/4/2022 10:53 EDT

## 2022-08-05 ENCOUNTER — PATIENT ROUNDING (BHMG ONLY) (OUTPATIENT)
Dept: OBSTETRICS AND GYNECOLOGY | Age: 57
End: 2022-08-05

## 2022-08-05 NOTE — PROGRESS NOTES
A MY CHART MESSAGE HAS BEEN SENT TO THE PATIENT [place] [facility] [place].

## 2022-08-08 LAB
CYTOLOGIST CVX/VAG CYTO: NORMAL
CYTOLOGY CVX/VAG DOC CYTO: NORMAL
CYTOLOGY CVX/VAG DOC THIN PREP: NORMAL
DX ICD CODE: NORMAL
HIV 1 & 2 AB SER-IMP: NORMAL
HPV I/H RISK 4 DNA CVX QL PROBE+SIG AMP: NEGATIVE
OTHER STN SPEC: NORMAL
STAT OF ADQ CVX/VAG CYTO-IMP: NORMAL

## 2022-08-09 DIAGNOSIS — R92.8 ABNORMAL MAMMOGRAM: Primary | ICD-10-CM

## 2022-08-09 NOTE — PROGRESS NOTES
Please call patient to inform her Pap smear is normal and HPV negative. I recommend repeating in 5 years.  Thank you!    Tamika Sweeney MD  9/29/2021  10:14 EDT

## 2022-08-10 NOTE — PROGRESS NOTES
I spoke with patient about abnormal left mammogram and recommendation for diagnostic mammogram and limited breast ultrasound.  Confirmed order as well.  Please let me know if you need anything else to schedule.

## 2022-08-19 ENCOUNTER — APPOINTMENT (OUTPATIENT)
Dept: WOMENS IMAGING | Facility: HOSPITAL | Age: 57
End: 2022-08-19

## 2022-08-19 PROCEDURE — 77065 DX MAMMO INCL CAD UNI: CPT | Performed by: RADIOLOGY

## 2022-08-19 PROCEDURE — 76642 ULTRASOUND BREAST LIMITED: CPT | Performed by: RADIOLOGY

## 2022-08-19 PROCEDURE — 77061 BREAST TOMOSYNTHESIS UNI: CPT | Performed by: RADIOLOGY

## 2022-08-19 PROCEDURE — G0279 TOMOSYNTHESIS, MAMMO: HCPCS | Performed by: RADIOLOGY

## 2022-08-23 ENCOUNTER — TELEPHONE (OUTPATIENT)
Dept: OBSTETRICS AND GYNECOLOGY | Age: 57
End: 2022-08-23

## 2022-08-23 NOTE — TELEPHONE ENCOUNTER
Surya at [facility] calls needing orders placed for a left breast u/s guided bx. Please advise if you will place orders, Dr Sweeney out of office, report in chart

## 2022-08-25 DIAGNOSIS — R92.8 ABNORMAL MAMMOGRAM: Primary | ICD-10-CM

## 2022-08-25 NOTE — PROGRESS NOTES
Spoke with patient regarding abnormal imaging and recommendation for Us guided biopsy.  It's scheduled for Monday.  Please let me know if you need any other orders.

## 2022-08-26 ENCOUNTER — TELEPHONE (OUTPATIENT)
Dept: OBSTETRICS AND GYNECOLOGY | Age: 57
End: 2022-08-26

## 2022-08-26 ENCOUNTER — APPOINTMENT (OUTPATIENT)
Dept: WOMENS IMAGING | Facility: HOSPITAL | Age: 57
End: 2022-08-26

## 2022-08-26 DIAGNOSIS — Z98.890 STATUS POST BREAST BIOPSY: Primary | ICD-10-CM

## 2022-08-26 PROCEDURE — A4648 IMPLANTABLE TISSUE MARKER: HCPCS | Performed by: RADIOLOGY

## 2022-08-26 PROCEDURE — 19083 BX BREAST 1ST LESION US IMAG: CPT | Performed by: RADIOLOGY

## 2022-08-26 NOTE — TELEPHONE ENCOUNTER
Manisha,    Can you please place an order for pts post bx mammogram? Left Diagnostic Mammogram for [facility].    Thanks,    Ekta

## 2022-08-29 ENCOUNTER — TELEPHONE (OUTPATIENT)
Dept: SURGERY | Facility: CLINIC | Age: 57
End: 2022-08-29

## 2022-08-29 NOTE — TELEPHONE ENCOUNTER
Patient scheduled for consult on 9/8/22  @ 9:00 am  Patient gave verbal understanding of date/ time/ location.  New patient packet mailed today    Patient aware to arrive 15 mins early

## 2022-09-08 ENCOUNTER — PREP FOR SURGERY (OUTPATIENT)
Dept: OTHER | Facility: HOSPITAL | Age: 57
End: 2022-09-08

## 2022-09-08 ENCOUNTER — OFFICE VISIT (OUTPATIENT)
Dept: SURGERY | Facility: CLINIC | Age: 57
End: 2022-09-08

## 2022-09-08 ENCOUNTER — TELEPHONE (OUTPATIENT)
Dept: SURGERY | Facility: CLINIC | Age: 57
End: 2022-09-08

## 2022-09-08 VITALS
RESPIRATION RATE: 17 BRPM | WEIGHT: 207 LBS | HEIGHT: 64 IN | OXYGEN SATURATION: 98 % | SYSTOLIC BLOOD PRESSURE: 136 MMHG | BODY MASS INDEX: 35.34 KG/M2 | DIASTOLIC BLOOD PRESSURE: 84 MMHG | HEART RATE: 69 BPM

## 2022-09-08 DIAGNOSIS — Z17.1 MALIGNANT NEOPLASM OF UPPER-INNER QUADRANT OF LEFT BREAST IN FEMALE, ESTROGEN RECEPTOR NEGATIVE: Primary | ICD-10-CM

## 2022-09-08 DIAGNOSIS — C50.212 MALIGNANT NEOPLASM OF UPPER-INNER QUADRANT OF LEFT BREAST IN FEMALE, ESTROGEN RECEPTOR NEGATIVE: Primary | ICD-10-CM

## 2022-09-08 PROCEDURE — 99205 OFFICE O/P NEW HI 60 MIN: CPT | Performed by: SURGERY

## 2022-09-08 RX ORDER — DIAZEPAM 5 MG/1
10 TABLET ORAL ONCE
Status: CANCELLED | OUTPATIENT
Start: 2022-10-18 | End: 2022-09-08

## 2022-09-08 RX ORDER — HEPARIN SODIUM 5000 [USP'U]/ML
5000 INJECTION, SOLUTION INTRAVENOUS; SUBCUTANEOUS
Status: CANCELLED | OUTPATIENT
Start: 2022-10-18 | End: 2022-10-19

## 2022-09-08 NOTE — TELEPHONE ENCOUNTER
Patient is scheduled with Dr. Agustin on 9/19/22 at 10:00 am    Patient's daughter took information and gave verbal understanding

## 2022-09-08 NOTE — PROGRESS NOTES
[facility]     Referring Provider: Fatoumata Hidalgo MD     Chief complaint: Newly diagnosed breast cancer     HPI: Ms. Bebe Kiser is a 58 yo woman, seen at the request of Dr. Fatoumata Hidalgo, for a new diagnosis of left breast cancer. This was initially detected as an imaging abnormality on routine screening. Her most recent mammogram prior to this year was in 2016. Her work-up is detailed in the oncologic history below. Prior to the biopsy, she denies any breast lumps, pain, skin changes, or nipple discharge. She denies any prior history of abnormal mammograms or breast biopsies. She denies any family history of breast or ovarian cancer. She was joined today in clinic by her daughter. She gave consent for her daughter to be present during her examination and participate in the discussion.       Oncology/Hematology History   Malignant neoplasm of upper-inner quadrant of left breast in female, estrogen receptor negative (HCC)   1/5/2016 Imaging    [facility]  The parenchyma of both breasts is largely fatty-replaced. I see no masses, areas of architectural distortion or skin thickening. There is no evidence for axillary lymphadenopathy or nipple retraction.  BI-RADS 1: Negative.     8/3/2022 Initial Diagnosis    Malignant neoplasm of upper-inner quadrant of left breast in female, estrogen receptor negative (HCC)     8/4/2022 Imaging    Screening MMG with Gautam ([facility]):  The breasts are almost entirely fatty.   There is a new small, oval mass measuring 8 mm with indistinct margins and associated fine-linear calcifications seen in the middle one-third region of the left breast at 9 o'clock.  In the right breast, no suspicious masses, significant calcifications or other abnormalities are seen.  BI-RADS 0: Incomplete.      8/19/2022 Imaging    Left Diagnostic MMG with Gautam & Left Breast US ([facility]):   MMG:  On the present examination, there is a small, irregular mass measuring 6 mm with indistinct  margins and associated fine-linear calcifications in the middle one-third 9:30 o'clock region of the left breast located 10 centimeters from the nipple.   US:   Ultrasound demonstrates an irregular heterogeneous solid mass with indistinct margins measuring 5 x 5 x 4 mm in the middle one-third 9:30 o'clock region of the left breast located 10 centimeters from the nipple.   BI-RADS 4C: Suspicious.      8/26/2022 Biopsy    Left Breast, US-Guided Biopsy ([facility]):    Left Breast, 9:30, Core Biopsies:   Invasive Mammary Carcinoma, No special type (Ductal)    Histologic Grade ([facility]):     Glandular/Tubular Score: 3     Nuclear Score: 3     Mitotic Score: 2     Overall Grade: 3 (High Grade)    Size: 0.7 cm    Lymphovascular Invasion: Not Identified     Microcalcifications: Microcalcifications in Invasive Carcinoma.   No DCIS is Identified.   -Clip projects up to 6 mm away from residual calcifications on postbiopsy mammogram.    ER negative (0%)  LA negative (0%)  Her2 negative (IHC 1+)  Ki-67 58.07%         Review of Systems   Constitutional: Negative for appetite change, chills, diaphoresis, fatigue, fever and unexpected weight change.   HENT:   Positive for sore throat. Negative for hearing loss, lump/mass, mouth sores, nosebleeds, tinnitus, trouble swallowing and voice change.    Eyes: Negative for eye problems and icterus.   Respiratory: Negative for chest tightness, cough, hemoptysis, shortness of breath and wheezing.    Cardiovascular: Negative for chest pain, leg swelling and palpitations.   Gastrointestinal: Negative for abdominal distention, abdominal pain, blood in stool, constipation, diarrhea, nausea, rectal pain and vomiting.   Endocrine: Negative for hot flashes.   Genitourinary: Negative for bladder incontinence, difficulty urinating, dyspareunia, dysuria, frequency, hematuria, menstrual problem, nocturia, pelvic pain, vaginal bleeding and vaginal discharge.    Musculoskeletal: Negative for arthralgias,  back pain, flank pain, gait problem, myalgias, neck pain and neck stiffness.   Skin: Negative for itching, rash and wound.   Neurological: Negative for dizziness, extremity weakness, gait problem, headaches, light-headedness, numbness, seizures and speech difficulty.   Hematological: Negative for adenopathy. Does not bruise/bleed easily.   Psychiatric/Behavioral: Negative for confusion, decreased concentration, depression, sleep disturbance and suicidal ideas. The patient is not nervous/anxious.    I personally reviewed and updated the ROS.      Medications:    Current Outpatient Medications:   •  carvedilol (COREG) 25 MG tablet, TAKE 1 TABLET BY MOUTH TWICE A DAY WITH MEALS, Disp: 180 tablet, Rfl: 3  •  multivitamin (THERAGRAN) tablet tablet, Take 1 tablet by mouth Daily., Disp: , Rfl:   •  spironolactone (ALDACTONE) 25 MG tablet, TAKE 1 TABLET BY MOUTH EVERY DAY, Disp: 90 tablet, Rfl: 3  •  VITAMIN D PO, Take  by mouth Daily., Disp: , Rfl:       Allergies:  No Known Allergies      Past Medical History:   Diagnosis Date   • Hypertension        Past Surgical History:   Procedure Laterality Date   • CATARACT EXTRACTION Bilateral    • COLONOSCOPY      2013       Family History   Problem Relation Age of Onset   • No Known Problems Father    • Hypotension Mother    • Breast cancer Neg Hx    • Ovarian cancer Neg Hx    • Uterine cancer Neg Hx    • Colon cancer Neg Hx        Social History     Socioeconomic History   • Marital status:    • Number of children: 3   Tobacco Use   • Smoking status: Never Smoker   • Smokeless tobacco: Never Used   Vaping Use   • Vaping Use: Never used   Substance and Sexual Activity   • Alcohol use: Not Currently     Comment: OCCASIONAL   • Drug use: No   • Sexual activity: Yes     Partners: Male     Birth control/protection: Post-menopausal     Patient drinks 3 servings of caffeine per day.       GYNECOLOGIC HISTORY:   . P: 2. AB: 2.  Last menstrual period: Postmenopausal  Age  at menarche: 10  Age at first childbirth: 20  Lactation/How long: NA  Age at menopause: 46  Total years of oral contraceptive use: 30  Total years of hormone replacement therapy: 0      PHYSICAL EXAMINATION:   Vitals:    09/08/22 0911   BP: 136/84   Pulse: 69   Resp: 17   SpO2: 98%     ECOG 0 - Asymptomatic  General: NAD, well appearing  Psych: a&o x 3, normal mood and affect  Eyes: EOMI, no scleral icterus  ENMT: neck supple without masses or thyromegaly, mucus membranes moist  Resp: normal effort, CTAB  CV: RRR, no murmurs, no edema  GI: soft, NT, ND  MSK: normal gait, normal ROM in bilateral shoulders  Lymph nodes: no cervical, supraclavicular or axillary lymphadenopathy  Breast: very large size, pendulous, left slightly larger than right  Right: No visible abnormalities on inspection while seated, with arms raised or hands on hips. No masses, skin changes, or nipple abnormalities.  Left: No visible abnormalities on inspection while seated, with arms raised or hands on hips. At 10:30, 9 cm FN, there are postbiopsy changes. No skin changes or nipple abnormalities.    Left breast, in-office ultrasound: At 10:30, 9 cm FN, there is a hypoechoic mass with biopsy clip visualized.  This is located about 15 mm deep to the skin.      I have independently reviewed her imaging and here are my findings:   In the left upper inner breast, there initially was a 5 mm mass seen on ultrasound associated with calcifications on mammogram. The clip is about 6 mm away from residual calcifications on postbiopsy mammogram.      Assessment:  57 y.o. F with a new diagnosis of left breast cancer: High grade, triple negative, invasive ductal carcinoma; clinical T1bN0, anatomic stage IA, prognostic stage IB. The cancer is at least 7 mm on core biopsy.    Discussion:  I had an extensive discussion with the patient and her family about the nature of her breast cancer diagnosis. We reviewed the components of breast tissue including ducts and  lobules. We reviewed her pathology report in detail. We reviewed breast cancer histology, including stage, grade, ER/FL receptors, HER2 receptors and how this applies to her diagnosis. We reviewed the basics of systemic and local/regional management of breast cancer.     We reviewed potential surgical treatments to include partial mastectomy, mastectomy, sentinel lymph node biopsy and axillary node dissection and discussed the rationale associated with each approach. Regarding radiation therapy, we discussed that radiation is indicated in all cases of breast conservation and in only limited circumstances following mastectomy. We discussed that the primary goal of adjuvant radiation is to decrease the likelihood of local recurrence.     We discussed that in her case, with at least a 7 mm triple negative breast cancer, systemic treatment would involve chemotherapy. Chemotherapy can occur either before or after surgery. Sometimes we give it before surgery to help downstage the breast/axilla or because response to neoadjuvant treatment will dictate additional adjuvant treatment. In her case, her cancer is small and neoadjuvant treatment would not improve the surgical plan. She will discuss this further with Dr. Bernardo. She will require port placement.    She is a good candidate for lumpectomy and she would like to proceed with breast conservation. We discussed that lumpectomy would require preoperative wire-localization. We also discussed the risk of positive margins and that she must have negative margins for lumpectomy to be an appropriate oncologic procedure. I will make every effort to obtain negative margins at her initial operation, but there is a 10-15% chance that she will require a second operation for re-excision, or possibly a total mastectomy. We will not know the margin status until after her final pathology has returned.     Because of the upper inner quadrant location and her breast size, she would have  the best cosmetic result with an oncoplastic closure. I will refer her to plastic surgery to discuss what this will entail.    We discussed that most breast cancer is not hereditary, however given her triple negative diagnosis, this may play a role in her case. Genetic testing is warranted and was sent today. This could affect surgical decision making. Should the results show a pathologic mutation, I would recommend a mastectomy on the cancer side and a contralateral risk-reduction mastectomy. She understands that this would not be for the treatment of her left breast cancer and will not improve her prognosis long term. This would be to reduce her risk of a second, primary breast cancer. If she is negative for a mutation, then I would not recommend a bilateral prophylactic mastectomy, since her risk of a second primary cancer would be relatively low.    We discussed axillary staging. I described the procedure for sentinel lymph node biopsy in detail, including the preoperative injection of a radiotracer and intraoperative injection of lymphazurin blue dye. I explained that this is a mapping test and not a cancer test, that all of the lymph nodes containing these dyes will be removed for complete testing by pathology, and that the results could impact the decision for adjuvant treatment or additional surgery.    I described additional risks and potential complications associated with surgery, including, but not limited to, bleeding, infection, pneumothorax, port malfunction, complications related to blue dye, lymphedema, deformity/poor cosmetic result, chronic pain, neurovascular injury, numbness, seroma, hematoma, deep venous thrombosis, skin flap necrosis, disease recurrence and the possibility of requiring additional surgery. We also discussed other treatment options including the option of not undergoing any surgical treatment and the risks associated with this including disease progression. She expressed an  understanding of these factors and wished to proceed.    Plan:  A multidisciplinary plan has been formulated for the patient:      (1) Breast Surgical Oncology:  -F/u genetic testing. I will call her with results.  -Plastic surgery referral.   -Port placement, left needle-localized partial mastectomy and sentinel lymph node biopsy with oncoplastic closure.    (2) Medical Oncology:  -Appointment scheduled with Dr. Bernardo on 9/14/22.    (3) Radiation Oncology:  -Will refer postoperatively.    Manisha Holley MD    I spent 90 minutes caring for Bebe on this date of service. This time includes time spent by me in the following activities: preparing for the visit, performing a medically appropriate examination and/or evaluation , counseling and educating the patient/family/caregiver, ordering medications, tests, or procedures, referring and communicating with other health care professionals , documenting information in the medical record and independently interpreting results and communicating that information with the patient/family/caregiver.      CC:  MD Kevan Bonilla MD Avital Hahn, MD Ryan Wermeling, MD

## 2022-09-09 ENCOUNTER — TRANSCRIBE ORDERS (OUTPATIENT)
Dept: SURGERY | Facility: CLINIC | Age: 57
End: 2022-09-09

## 2022-09-09 DIAGNOSIS — Z17.1 MALIGNANT NEOPLASM OF UPPER-INNER QUADRANT OF LEFT BREAST IN FEMALE, ESTROGEN RECEPTOR NEGATIVE: Primary | ICD-10-CM

## 2022-09-09 DIAGNOSIS — C50.212 MALIGNANT NEOPLASM OF UPPER-INNER QUADRANT OF LEFT BREAST IN FEMALE, ESTROGEN RECEPTOR NEGATIVE: Primary | ICD-10-CM

## 2022-09-14 ENCOUNTER — TELEPHONE (OUTPATIENT)
Dept: SURGERY | Facility: CLINIC | Age: 57
End: 2022-09-14

## 2022-09-14 ENCOUNTER — LAB (OUTPATIENT)
Dept: LAB | Facility: HOSPITAL | Age: 57
End: 2022-09-14

## 2022-09-14 ENCOUNTER — CONSULT (OUTPATIENT)
Dept: ONCOLOGY | Facility: CLINIC | Age: 57
End: 2022-09-14

## 2022-09-14 VITALS
WEIGHT: 202.7 LBS | HEIGHT: 64 IN | RESPIRATION RATE: 18 BRPM | BODY MASS INDEX: 34.6 KG/M2 | OXYGEN SATURATION: 99 % | SYSTOLIC BLOOD PRESSURE: 125 MMHG | TEMPERATURE: 97.1 F | DIASTOLIC BLOOD PRESSURE: 78 MMHG | HEART RATE: 65 BPM

## 2022-09-14 DIAGNOSIS — C50.212 MALIGNANT NEOPLASM OF UPPER-INNER QUADRANT OF LEFT BREAST IN FEMALE, ESTROGEN RECEPTOR NEGATIVE: ICD-10-CM

## 2022-09-14 DIAGNOSIS — Z17.1 MALIGNANT NEOPLASM OF UPPER-INNER QUADRANT OF LEFT BREAST IN FEMALE, ESTROGEN RECEPTOR NEGATIVE: ICD-10-CM

## 2022-09-14 DIAGNOSIS — Z17.1 MALIGNANT NEOPLASM OF UPPER-INNER QUADRANT OF LEFT BREAST IN FEMALE, ESTROGEN RECEPTOR NEGATIVE: Primary | ICD-10-CM

## 2022-09-14 DIAGNOSIS — C50.212 MALIGNANT NEOPLASM OF UPPER-INNER QUADRANT OF LEFT BREAST IN FEMALE, ESTROGEN RECEPTOR NEGATIVE: Primary | ICD-10-CM

## 2022-09-14 LAB
ALBUMIN SERPL-MCNC: 4.7 G/DL (ref 3.5–5.2)
ALBUMIN/GLOB SERPL: 1.6 G/DL (ref 1.1–2.4)
ALP SERPL-CCNC: 52 U/L (ref 38–116)
ALT SERPL W P-5'-P-CCNC: 14 U/L (ref 0–33)
ANION GAP SERPL CALCULATED.3IONS-SCNC: 11.4 MMOL/L (ref 5–15)
AST SERPL-CCNC: 20 U/L (ref 0–32)
BASOPHILS # BLD AUTO: 0.05 10*3/MM3 (ref 0–0.2)
BASOPHILS NFR BLD AUTO: 0.5 % (ref 0–1.5)
BILIRUB SERPL-MCNC: 0.3 MG/DL (ref 0.2–1.2)
BUN SERPL-MCNC: 18 MG/DL (ref 6–20)
BUN/CREAT SERPL: 19.8 (ref 7.3–30)
CALCIUM SPEC-SCNC: 10.1 MG/DL (ref 8.5–10.2)
CHLORIDE SERPL-SCNC: 101 MMOL/L (ref 98–107)
CO2 SERPL-SCNC: 25.6 MMOL/L (ref 22–29)
CREAT SERPL-MCNC: 0.91 MG/DL (ref 0.6–1.1)
DEPRECATED RDW RBC AUTO: 44.2 FL (ref 37–54)
EGFRCR SERPLBLD CKD-EPI 2021: 72.8 ML/MIN/1.73
EOSINOPHIL # BLD AUTO: 0.4 10*3/MM3 (ref 0–0.4)
EOSINOPHIL NFR BLD AUTO: 4.1 % (ref 0.3–6.2)
ERYTHROCYTE [DISTWIDTH] IN BLOOD BY AUTOMATED COUNT: 12.9 % (ref 12.3–15.4)
GLOBULIN UR ELPH-MCNC: 3 GM/DL (ref 1.8–3.5)
GLUCOSE SERPL-MCNC: 94 MG/DL (ref 74–124)
HCT VFR BLD AUTO: 36 % (ref 34–46.6)
HGB BLD-MCNC: 11.7 G/DL (ref 12–15.9)
IMM GRANULOCYTES # BLD AUTO: 0.03 10*3/MM3 (ref 0–0.05)
IMM GRANULOCYTES NFR BLD AUTO: 0.3 % (ref 0–0.5)
LYMPHOCYTES # BLD AUTO: 2.58 10*3/MM3 (ref 0.7–3.1)
LYMPHOCYTES NFR BLD AUTO: 26.2 % (ref 19.6–45.3)
MCH RBC QN AUTO: 30.4 PG (ref 26.6–33)
MCHC RBC AUTO-ENTMCNC: 32.5 G/DL (ref 31.5–35.7)
MCV RBC AUTO: 93.5 FL (ref 79–97)
MONOCYTES # BLD AUTO: 0.68 10*3/MM3 (ref 0.1–0.9)
MONOCYTES NFR BLD AUTO: 6.9 % (ref 5–12)
NEUTROPHILS NFR BLD AUTO: 6.12 10*3/MM3 (ref 1.7–7)
NEUTROPHILS NFR BLD AUTO: 62 % (ref 42.7–76)
NRBC BLD AUTO-RTO: 0 /100 WBC (ref 0–0.2)
PLATELET # BLD AUTO: 246 10*3/MM3 (ref 140–450)
PMV BLD AUTO: 10.8 FL (ref 6–12)
POTASSIUM SERPL-SCNC: 4.3 MMOL/L (ref 3.5–4.7)
PROT SERPL-MCNC: 7.7 G/DL (ref 6.3–8)
RBC # BLD AUTO: 3.85 10*6/MM3 (ref 3.77–5.28)
SODIUM SERPL-SCNC: 138 MMOL/L (ref 134–145)
WBC NRBC COR # BLD: 9.86 10*3/MM3 (ref 3.4–10.8)

## 2022-09-14 PROCEDURE — 36415 COLL VENOUS BLD VENIPUNCTURE: CPT

## 2022-09-14 PROCEDURE — 80053 COMPREHEN METABOLIC PANEL: CPT

## 2022-09-14 PROCEDURE — 99205 OFFICE O/P NEW HI 60 MIN: CPT | Performed by: INTERNAL MEDICINE

## 2022-09-14 PROCEDURE — 85025 COMPLETE CBC W/AUTO DIFF WBC: CPT

## 2022-09-14 NOTE — PROGRESS NOTES
Subjective   Bebe Kiser is a 57 y.o. female.  Referred by Dr. Holley for left breast invasive ductal carcinoma, triple negative    History of Present Illness     Patient is a 57-year-old postmenopausal -American lady who presented with a screen detected abnormality of the left breast.    She denies any family history of breast cancer.  No previous abnormal mammograms or biopsies.  Comorbidities include hypertension.    8/4/2022-bilateral screening mammogram  Platelets are almost entirely fatty.  There is a new small, oval mass measuring 8 mm with indistinct margins, associated fine linear calcifications in the middle one third region of the left breast at 9:00.  No suspicious abnormalities of the right breast.    8/19/2022-left breast diagnostic mammogram  The breast is almost entirely fatty.  Additional evaluation of the left breast at 9:00 there is a small irregular mass measuring 6 mm with indistinct margins and associated fine linear calcifications in the middle one third region of the left breast, 10 cm from the nipple.    Left breast ultrasound  Ultrasound demonstrates an irregular heterogeneous mass with indistinct margins measuring 5 x 5 x 4 mm in the middle third, 9:30 position of the left breast, 10 cm from the nipple    Impression  Ultrasound-guided biopsy recommended.    8/25/2022-ultrasound-guided biopsy  Pathology consistent with invasive ductal carcinoma  Grade 3  Tumor size 0.7 cm  No lymphovascular space invasion  ER negative  [place] negative  HER2 1+  Ki-67 58.07%    9/8/2022-genetic testing ordered.  Results pending    She was evaluated by Dr. Holley on 9/8/2022 and referred for discussion of neoadjuvant versus adjuvant therapy.      The following portions of the patient's history were reviewed and updated as appropriate: allergies, current medications, past family history, past medical history, past social history, past surgical history and problem list.    Past Medical History:   Diagnosis  "Date   • Hypertension         Past Surgical History:   Procedure Laterality Date   • CATARACT EXTRACTION Bilateral 2019   • COLONOSCOPY      2013        Family History   Problem Relation Age of Onset   • No Known Problems Father    • Hypotension Mother    • Breast cancer Neg Hx    • Ovarian cancer Neg Hx    • Uterine cancer Neg Hx    • Colon cancer Neg Hx         Social History     Socioeconomic History   • Marital status:    • Number of children: 3   Tobacco Use   • Smoking status: Never Smoker   • Smokeless tobacco: Never Used   Vaping Use   • Vaping Use: Never used   Substance and Sexual Activity   • Alcohol use: Not Currently     Comment: OCCASIONAL   • Drug use: No   • Sexual activity: Yes     Partners: Male     Birth control/protection: Post-menopausal        OB History        2    Para   2    Term   2            AB        Living   2       SAB        IAB        Ectopic        Molar        Multiple        Live Births   2             Age at menarche-10   3 para 2  1  Age at first live childbirth-20  Age at menopause-47  All contraceptive pill use-30 years  Hormone replacement therapy-none    No Known Allergies         Review of Systems   Constitutional: Negative.    HENT: Negative.    Eyes: Negative.    Respiratory: Negative.    Cardiovascular: Negative.    Gastrointestinal: Negative.    Endocrine: Negative.    Genitourinary: Negative.    Musculoskeletal: Negative.    Allergic/Immunologic: Negative.    Neurological: Negative.    Hematological: Negative.    Psychiatric/Behavioral: The patient is nervous/anxious.          Objective   Height 162.6 cm (64.02\"), not currently breastfeeding.   Physical Exam  Vitals reviewed.   Constitutional:       Appearance: Normal appearance.   HENT:      Head: Normocephalic and atraumatic.      Nose: Nose normal.   Eyes:      Pupils: Pupils are equal, round, and reactive to light.   Cardiovascular:      Rate and Rhythm: Normal rate and regular " rhythm.      Pulses: Normal pulses.      Heart sounds: Normal heart sounds.   Pulmonary:      Effort: Pulmonary effort is normal.   Abdominal:      General: Abdomen is flat.   Musculoskeletal:         General: Normal range of motion.      Cervical back: Normal range of motion.   Skin:     General: Skin is warm.   Neurological:      General: No focal deficit present.      Mental Status: She is alert and oriented to person, place, and time.   Psychiatric:         Mood and Affect: Mood normal.         Behavior: Behavior normal.         Thought Content: Thought content normal.         Judgment: Judgment normal.       Breast Exam: Right breast appears normal on inspection.  No palpable abnormalities of the right breast.  Left breast appears normal on inspection.  No palpable abnormalities of the left breast.    No visits with results within 30 Day(s) from this visit.   Latest known visit with results is:   Office Visit on 08/04/2022   Component Date Value Ref Range Status   • Diagnosis 08/04/2022 Comment   Final    Comment: NEGATIVE FOR INTRAEPITHELIAL LESION OR MALIGNANCY.  CELLULAR CHANGES ASSOCIATED WITH ATROPHY ARE PRESENT.     • Specimen adequacy: 08/04/2022 Comment   Final    Comment: Satisfactory for evaluation.  Endocervical component may not be  distinguished in cases of atrophy.     • Clinician Provided ICD-10: 08/04/2022 Comment   Final    Comment: Z01.419  Z12.4     • Performed by: 08/04/2022 Comment   Final    Liliane Lopez, Cytotechnologist ([facility])   • . 08/04/2022 .   Final   • Note: 08/04/2022 Comment   Final    Comment: The Pap smear is a screening test designed to aid in the detection of  premalignant and malignant conditions of the uterine cervix.  It is not a  diagnostic procedure and should not be used as the sole means of detecting  cervical cancer.  Both false-positive and false-negative reports do occur.     • Method: 08/04/2022 Comment   Final    Comment: This liquid based ThinPrep(R) pap test  was screened with the  use of an image guided system.     • HPV Aptima 08/04/2022 Negative  Negative Final    Comment: This nucleic acid amplification test detects fourteen high-risk  HPV types (16,18,31,33,35,39,45,51,52,56,58,59,66,68) without  differentiation.          No radiology results for the last 30 days.       Assessment & Plan       *Left breast invasive ductal carcinoma  · Grade 3, ER/KY negative, HER2 1+ on immunohistochemistry, Ki-67 58%  · Tumor measures 8 mm on mammogram and 5 mm on ultrasound  · On the biopsy the tumor measures 0.7 cm.  Discussed at length the details of imaging and pathology report.Discussed the origin of breast cancer from the ducts and the lobules and the histological type of breast cancer based on site of origin. Discussed the tumor size, lymph node status and stage of the cancer. Explained the presence of DCIS. Discussed the receptor status including ER, KY and her-2 jay and their significance in determining the biology and treatment. Also discussed the importance of grade and ki-67.   The steps of curative intent breast cancer treatment have been explained, including surgery, possible chemotherapy, possible radiation and possible endocrine therapy.   · Given the small size of the tumor as long as lymph nodes are negative there would be no significant benefit from neoadjuvant chemotherapy as she could possibly be treated with Taxotere and cyclophosphamide if the tumor is under 1 cm.  · However if the tumor is greater than 1 cm then we would have to treat with AC followed by Taxol.  · As long as the tumor is under 2 cm no additional benefit from immunotherapy or carboplatin.  · I will obtain an MRI to determine the lymph slade status and if lymph nodes negative on MRI then proceed with surgery first.    *Hypertension-blood pressure 125/78, continue current medication    *Obesity-BMI 34.8.    *Follow-up-2 weeks to discuss MRI results    60-minute spent on the encounter including  reviewing the medical records, face-to-face time, documentation and care coordination.  Discussed case with Dr. Holley.

## 2022-09-14 NOTE — TELEPHONE ENCOUNTER
I called 30 gram tube 2.5% EMLA Cream to the patients following pharmacy    Directions are to apply typically to the LEFT areola and the skin just beyond it on the morning of surgery.     [id number] - [place], [place] - [address] [place] [address] RD. AT [facility] - [phone] [facility] [phone] FX

## 2022-09-27 ENCOUNTER — HOSPITAL ENCOUNTER (OUTPATIENT)
Dept: MRI IMAGING | Facility: HOSPITAL | Age: 57
Discharge: HOME | End: 2022-09-27
Admitting: INTERNAL MEDICINE

## 2022-09-27 DIAGNOSIS — Z17.1 MALIGNANT NEOPLASM OF UPPER-INNER QUADRANT OF LEFT BREAST IN FEMALE, ESTROGEN RECEPTOR NEGATIVE: ICD-10-CM

## 2022-09-27 DIAGNOSIS — C50.212 MALIGNANT NEOPLASM OF UPPER-INNER QUADRANT OF LEFT BREAST IN FEMALE, ESTROGEN RECEPTOR NEGATIVE: ICD-10-CM

## 2022-09-27 PROCEDURE — A9577 INJ MULTIHANCE: HCPCS | Performed by: INTERNAL MEDICINE

## 2022-09-27 PROCEDURE — 0 GADOBENATE DIMEGLUMINE 529 MG/ML SOLUTION: Performed by: INTERNAL MEDICINE

## 2022-09-27 PROCEDURE — 77049 MRI BREAST C-+ W/CAD BI: CPT

## 2022-09-27 RX ADMIN — GADOBENATE DIMEGLUMINE 19 ML: 529 INJECTION, SOLUTION INTRAVENOUS at 16:43

## 2022-09-28 ENCOUNTER — TELEPHONE (OUTPATIENT)
Dept: SURGERY | Facility: CLINIC | Age: 57
End: 2022-09-28

## 2022-09-28 NOTE — TELEPHONE ENCOUNTER
I called Ms. Kiser to let her know that the MRI did not show any evidence of lymph node disease, so I think we are still okay with a surgery first approach.  However her genetic testing did show a PALB2 mutation conferring an increased risk of a second primary breast cancer.  In this situation I would recommend a bilateral mastectomy.  I will see her back next week to review the new surgical plan.  I have also let Dr. Agustin know.    Manisha Holley MD

## 2022-09-29 ENCOUNTER — NURSE NAVIGATOR (OUTPATIENT)
Dept: OTHER | Facility: HOSPITAL | Age: 57
End: 2022-09-29

## 2022-09-29 NOTE — PROGRESS NOTES
Referral received from Dr. Holley's office. I called Ms. Kiser and introduced myself and navigational services. She stated the consult with Dr. Calderon went well and she will have a follow up consult on Oct 6th to discuss surgery plans.  She has a good understanding of her pathology and stated the treatment options are still being decided. She was able to verbalize teach back on her plan of care.      She stated she has a limited support system with just a daughter that has chronic kidney issues. She assists her daughter with the care of her children and stated she was waiting to know the full plan of care before telling her about the diagnosis.        She stated she has no financial or transportation concerns at this time. She has no resource needs or ongoing concerns at this time.      We discussed we have support options if the need arises. We specifically talked about [facility] in light of her limited support. She stated she was intersted but wanted to wait a bit. Will send their information in the mail. She was thankful for the information.      We discussed integrative therapies and other services at the [facility]. She will received a navigation folder with the following information in the mail:     [facility], Cancer and Restorative Exercise (CARE), [employer] Exercise program, Guide for the Newly Diagnosed, [employer], [facility], Massage Therapy, Reiki Therapy, [employer] [place], Cancer Nutrition, and Survivorship Clinic.     She verbalized appreciation for navigational services and she has my contact information and will call with any questions that arise.     Acuity level 2

## 2022-10-04 ENCOUNTER — TELEPHONE (OUTPATIENT)
Dept: ONCOLOGY | Facility: CLINIC | Age: 57
End: 2022-10-04

## 2022-10-04 NOTE — TELEPHONE ENCOUNTER
----- Message from Manisha Burton RN sent at 10/4/2022 10:49 AM EDT -----  She has a video visit on Thursday, however she has spoke with Dr Holley about her MRi results and has an appt with her also on Thursday. Dr Bernardo said she will just plan to see her back after surgery which is scheduled 10/18.  Please reschedule a in-person visit 2-3 weeks from 10/18.  I called and reviewed this with her so she is aware of the plan.    Thank you!!

## 2022-10-06 ENCOUNTER — TRANSCRIBE ORDERS (OUTPATIENT)
Dept: SURGERY | Facility: CLINIC | Age: 57
End: 2022-10-06

## 2022-10-06 ENCOUNTER — OFFICE VISIT (OUTPATIENT)
Dept: SURGERY | Facility: CLINIC | Age: 57
End: 2022-10-06

## 2022-10-06 VITALS
SYSTOLIC BLOOD PRESSURE: 126 MMHG | WEIGHT: 202 LBS | HEART RATE: 69 BPM | HEIGHT: 64 IN | RESPIRATION RATE: 18 BRPM | BODY MASS INDEX: 34.49 KG/M2 | DIASTOLIC BLOOD PRESSURE: 74 MMHG | OXYGEN SATURATION: 96 %

## 2022-10-06 DIAGNOSIS — C50.212 MALIGNANT NEOPLASM OF UPPER-INNER QUADRANT OF LEFT BREAST IN FEMALE, ESTROGEN RECEPTOR NEGATIVE: Primary | ICD-10-CM

## 2022-10-06 DIAGNOSIS — Z15.09 MONOALLELIC MUTATION OF PALB2 GENE: ICD-10-CM

## 2022-10-06 DIAGNOSIS — Z17.1 MALIGNANT NEOPLASM OF UPPER-INNER QUADRANT OF LEFT BREAST IN FEMALE, ESTROGEN RECEPTOR NEGATIVE: Primary | ICD-10-CM

## 2022-10-06 DIAGNOSIS — Z15.09 MONOALLELIC MUTATION OF PALB2 GENE: Primary | ICD-10-CM

## 2022-10-06 DIAGNOSIS — Z15.89 MONOALLELIC MUTATION OF PALB2 GENE: ICD-10-CM

## 2022-10-06 DIAGNOSIS — Z15.01 MONOALLELIC MUTATION OF PALB2 GENE: Primary | ICD-10-CM

## 2022-10-06 DIAGNOSIS — Z15.01 MONOALLELIC MUTATION OF PALB2 GENE: ICD-10-CM

## 2022-10-06 DIAGNOSIS — Z15.89 MONOALLELIC MUTATION OF PALB2 GENE: Primary | ICD-10-CM

## 2022-10-06 PROCEDURE — 99215 OFFICE O/P EST HI 40 MIN: CPT | Performed by: SURGERY

## 2022-10-06 NOTE — PROGRESS NOTES
[facility]     Referring Provider: Fatoumata Hidalgo MD     Chief complaint: Newly diagnosed PALB2 mutation     HPI:   9/8/2022:  Ms. Bebe Kiser is a 56 yo woman, seen at the request of Dr. Fatoumata Hidalgo, for a new diagnosis of left breast cancer. This was initially detected as an imaging abnormality on routine screening. Her most recent mammogram prior to this year was in 2016. Her work-up is detailed in the oncologic history below. Prior to the biopsy, she denies any breast lumps, pain, skin changes, or nipple discharge. She denies any prior history of abnormal mammograms or breast biopsies. She denies any family history of breast or ovarian cancer. She was joined today in clinic by her daughter. She gave consent for her daughter to be present during her examination and participate in the discussion.    9/28/2022, Telephone Encounter:  I called Ms. Kiser to let her know that the MRI did not show any evidence of lymph node disease, so I think we are still okay with a surgery first approach.  However her genetic testing did show a PALB2 mutation conferring an increased risk of a second primary breast cancer.  In this situation I would recommend a bilateral mastectomy.  I will see her back next week to review the new surgical plan.  I have also let Dr. Agustin know.    10/6/2022, Interval History:  She returns today to discuss a change in the surgery plan due to her newly diagnosed PALB2 mutation.         Oncology/Hematology History   Malignant neoplasm of upper-inner quadrant of left breast in female, estrogen receptor negative (HCC)   1/5/2016 Imaging    Bilateral Diagnostic [facility] ([facility]):  The parenchyma of both breasts is largely fatty-replaced. I see no masses, areas of architectural distortion or skin thickening. There is no evidence for axillary lymphadenopathy or nipple retraction.  BI-RADS 1: Negative.     8/3/2022 Initial Diagnosis    Malignant neoplasm of upper-inner quadrant of left breast in  female, estrogen receptor negative (HCC)     8/4/2022 Imaging    Screening MMG with Gautam ([facility]):  The breasts are almost entirely fatty.   There is a new small, oval mass measuring 8 mm with indistinct margins and associated fine-linear calcifications seen in the middle one-third region of the left breast at 9 o'clock.  In the right breast, no suspicious masses, significant calcifications or other abnormalities are seen.  BI-RADS 0: Incomplete.      8/19/2022 Imaging    Left Diagnostic MMG with Gautam & Left Breast US ([facility]):   MMG:  On the present examination, there is a small, irregular mass measuring 6 mm with indistinct margins and associated fine-linear calcifications in the middle one-third 9:30 o'clock region of the left breast located 10 centimeters from the nipple.   US:   Ultrasound demonstrates an irregular heterogeneous solid mass with indistinct margins measuring 5 x 5 x 4 mm in the middle one-third 9:30 o'clock region of the left breast located 10 centimeters from the nipple.   BI-RADS 4C: Suspicious.      8/26/2022 Biopsy    Left Breast, US-Guided Biopsy ([facility]):    Left Breast, 9:30, Core Biopsies:   Invasive Mammary Carcinoma, No special type (Ductal)    Histologic Grade ([place]):     Glandular/Tubular Score: 3     Nuclear Score: 3     Mitotic Score: 2     Overall Grade: 3 (High Grade)    Size: 0.7 cm    Lymphovascular Invasion: Not Identified     Microcalcifications: Microcalcifications in Invasive Carcinoma.   No DCIS is Identified.   -Clip projects up to 6 mm away from residual calcifications on postbiopsy mammogram.    ER negative (0%)  MA negative (0%)  Her2 negative (IHC 1+)  Ki-67 58.07%     9/8/2022 Genetic Testing    Breast & Gyn Cancers Panel (36 genes):    PALB2 pathogenic mutation     9/27/2022 Imaging    Bilateral Breast MRI ( [facility]):  In the middle third upper inner quadrant of the left breast centered at the 10 o'clock position on the order of 7 cm from nipple there is an irregular  enhancing mass that measures on the order of 0.7 cm in anterior to posterior dimension, 0.6 cm in the superior to inferior dimension and 0.8 cm in the medial to lateral dimension. A focal signal void is seen at the posterior margin of the lesion. This represents the biopsy-proven site of malignancy with the adjacent mini cork-shaped metallic clip. Below threshold nonspecific thin linear enhancement that measures up to 1 cm in length is seen at the posterior margin of the biopsy clip. No mammographic abnormality in this region is appreciated.  Located on the order of 1 cm anterior to the biopsy-proven malignancy at the 10 o'clock axis is a 0.6 cm ill-defined focus of enhancement. Mammographically there are faint microcalcifications that are seen in this region on the mammogram dated 08/19/2022, these can be seen on the spot magnification image and to a lesser degree on the mammogram dated 8/4/2022.  No other areas of suspicious enhancement or morphology are seen in the left breast. I see no evidence for abnormal left breast skin, nipple or chest wall enhancement and there is no evidence for left axillary or internal mammary chain adenopathy.   There are no areas of suspicious enhancement or morphology in the right breast. I see no evidence for abnormal right breast skin, nipple or chest wall enhancement and there is no evidence for right axillary or internal mammary chain adenopathy.  BI-RADS 4: Suspicious.         Review of Systems:  See interval history.       Medications:    Current Outpatient Medications:   •  carvedilol (COREG) 25 MG tablet, TAKE 1 TABLET BY MOUTH TWICE A DAY WITH MEALS, Disp: 180 tablet, Rfl: 3  •  multivitamin (THERAGRAN) tablet tablet, Take 1 tablet by mouth Daily., Disp: , Rfl:   •  spironolactone (ALDACTONE) 25 MG tablet, TAKE 1 TABLET BY MOUTH EVERY DAY, Disp: 90 tablet, Rfl: 3  •  VITAMIN D PO, Take  by mouth Daily., Disp: , Rfl:       Allergies:  No Known Allergies      Family History    Problem Relation Age of Onset   • No Known Problems Father    • Hypotension Mother    • Breast cancer Neg Hx    • Ovarian cancer Neg Hx    • Uterine cancer Neg Hx    • Colon cancer Neg Hx        PHYSICAL EXAMINATION:   Vitals:    10/06/22 1111   BP: 126/74   Pulse: 69   Resp: 18   SpO2: 96%     ECOG 0 - Asymptomatic  General: NAD, well appearing  Psych: a&o x3, normal mood and affect  -Remainder of exam deferred.      I have independently reviewed her imaging and here are my findings:   and here are my findings:   In the left upper inner breast, there initially was a 5 mm mass seen on ultrasound associated with calcifications on mammogram. The clip is about 6 mm away from residual calcifications on postbiopsy mammogram.  On MRI, there is an 8 mm mass with biopsy clip located posteriorly. About 1 cm anterior to the mass, there is 6 mm of irregular enhancement on MRI in the location of the calcifications.      Assessment:  57 y.o. F with a diagnosis of left breast cancer: High grade, triple negative, invasive ductal carcinoma; clinical T1bN0, anatomic stage IA, prognostic stage IB. The cancer is at least 7 mm on core biopsy.  -She has a new diagnosis of a PALB2 mutation.    Discussion:  I explained that this mutation increases her lifetime risk of a second primary breast cancer. In this situation, I would recommend a bilateral mastectomy. She is in agreement with this plan. Because of her degree of ptosis, mastectomy will include removal of the nipple-areola-complex. She will see Dr. Agustin back to discuss options for reconstruction. She will still require port placement for adjuvant chemotherapy.    We again reviewed axillary staging. I described the procedure for sentinel lymph node biopsy in detail, including the preoperative injection of a radiotracer and intraoperative injection of lymphazurin blue dye. I explained that this is a mapping test and not a cancer test, that all of the lymph nodes containing  these dyes will be removed for complete testing by pathology, and that the results could impact the decision for adjuvant treatment or additional surgery. In her case, if multiple sentinel nodes are positive for cancer intraoperatively, I will proceed with a completion dissection. Even if the sentinel nodes are negative intraoperatively, there is a ~10-15% risk that they are positive on final pathology (false negative rate). In this case, she may require a second operation for completion dissection versus axillary radiation. I explained that axillary node dissection is associated with an increased risk of lymphedema versus sentinel lymph node biopsy (15-30% vs. <10%), that there is an increased risk of permanent upper inner arm numbness, and a risk of injury to motor nerves that control the shoulder muscles.      I described additional risks and potential complications associated with surgery, including, but not limited to, bleeding, infection, pneumothorax, port malfunction, complications related to blue dye, lymphedema, deformity/poor cosmetic result, chronic pain, neurovascular injury, numbness, seroma, hematoma, deep venous thrombosis, skin flap necrosis, disease recurrence and the possibility of requiring additional surgery. We also discussed other treatment options including the option of not undergoing any surgical treatment and the risks associated with this including disease progression. She expressed an understanding of these factors and wished to proceed.    Plan:  -Genetics clinic referral.  -See Dr. Agustin again preoperatively to discuss new surgical plan.  -Port placement, bilateral skin-sparing mastectomy and left sentinel lymph node biopsy, possible axillary dissection, with immediate reconstruction.    Manisha Holley MD    I spent 40 minutes caring for Bebe on this date of service. This time includes time spent by me in the following activities: preparing for the visit, counseling and  educating the patient/family/caregiver, referring and communicating with other health care professionals , documenting information in the medical record and independently interpreting results and communicating that information with the patient/family/caregiver.      CC:  MD Kevan Bonilla MD Avital Hahn, MD Ryan Wermeling, MD

## 2022-10-06 NOTE — H&P (VIEW-ONLY)
[facility]     Referring Provider: Fatoumata Hidalgo MD     Chief complaint: Newly diagnosed PALB2 mutation     HPI:   9/8/2022:  Ms. Bebe Kiser is a 58 yo woman, seen at the request of Dr. Fatoumata Hidalgo, for a new diagnosis of left breast cancer. This was initially detected as an imaging abnormality on routine screening. Her most recent mammogram prior to this year was in 2016. Her work-up is detailed in the oncologic history below. Prior to the biopsy, she denies any breast lumps, pain, skin changes, or nipple discharge. She denies any prior history of abnormal mammograms or breast biopsies. She denies any family history of breast or ovarian cancer. She was joined today in clinic by her daughter. She gave consent for her daughter to be present during her examination and participate in the discussion.    9/28/2022, Telephone Encounter:  I called Ms. Kiser to let her know that the MRI did not show any evidence of lymph node disease, so I think we are still okay with a surgery first approach.  However her genetic testing did show a PALB2 mutation conferring an increased risk of a second primary breast cancer.  In this situation I would recommend a bilateral mastectomy.  I will see her back next week to review the new surgical plan.  I have also let Dr. Agustin know.    10/6/2022, Interval History:  She returns today to discuss a change in the surgery plan due to her newly diagnosed PALB2 mutation.         Oncology/Hematology History   Malignant neoplasm of upper-inner quadrant of left breast in female, estrogen receptor negative (HCC)   1/5/2016 Imaging    [facility]  The parenchyma of both breasts is largely fatty-replaced. I see no masses, areas of architectural distortion or skin thickening. There is no evidence for axillary lymphadenopathy or nipple retraction.  BI-RADS 1: Negative.     8/3/2022 Initial Diagnosis    Malignant neoplasm of upper-inner quadrant of left breast in  female, estrogen receptor negative (HCC)     8/4/2022 Imaging    Screening MMG with Gautam ([facility]):  The breasts are almost entirely fatty.   There is a new small, oval mass measuring 8 mm with indistinct margins and associated fine-linear calcifications seen in the middle one-third region of the left breast at 9 o'clock.  In the right breast, no suspicious masses, significant calcifications or other abnormalities are seen.  BI-RADS 0: Incomplete.      8/19/2022 Imaging    Left Diagnostic MMG with Gautam & Left Breast US ([facility]):   MMG:  On the present examination, there is a small, irregular mass measuring 6 mm with indistinct margins and associated fine-linear calcifications in the middle one-third 9:30 o'clock region of the left breast located 10 centimeters from the nipple.   US:   Ultrasound demonstrates an irregular heterogeneous solid mass with indistinct margins measuring 5 x 5 x 4 mm in the middle one-third 9:30 o'clock region of the left breast located 10 centimeters from the nipple.   BI-RADS 4C: Suspicious.      8/26/2022 Biopsy    Left Breast, US-Guided Biopsy ([facility]):    Left Breast, 9:30, Core Biopsies:   Invasive Mammary Carcinoma, No special type (Ductal)    Histologic Grade ([facility]):     Glandular/Tubular Score: 3     Nuclear Score: 3     Mitotic Score: 2     Overall Grade: 3 (High Grade)    Size: 0.7 cm    Lymphovascular Invasion: Not Identified     Microcalcifications: Microcalcifications in Invasive Carcinoma.   No DCIS is Identified.   -Clip projects up to 6 mm away from residual calcifications on postbiopsy mammogram.    ER negative (0%)  RI negative (0%)  Her2 negative (IHC 1+)  Ki-67 58.07%     9/8/2022 Genetic Testing    Breast & Gyn Cancers Panel (36 genes):    PALB2 pathogenic mutation     9/27/2022 Imaging    Bilateral Breast MRI ( [facility]):  In the middle third upper inner quadrant of the left breast centered at the 10 o'clock position on the order of 7 cm from nipple there is an irregular  enhancing mass that measures on the order of 0.7 cm in anterior to posterior dimension, 0.6 cm in the superior to inferior dimension and 0.8 cm in the medial to lateral dimension. A focal signal void is seen at the posterior margin of the lesion. This represents the biopsy-proven site of malignancy with the adjacent mini cork-shaped metallic clip. Below threshold nonspecific thin linear enhancement that measures up to 1 cm in length is seen at the posterior margin of the biopsy clip. No mammographic abnormality in this region is appreciated.  Located on the order of 1 cm anterior to the biopsy-proven malignancy at the 10 o'clock axis is a 0.6 cm ill-defined focus of enhancement. Mammographically there are faint microcalcifications that are seen in this region on the mammogram dated 08/19/2022, these can be seen on the spot magnification image and to a lesser degree on the mammogram dated 8/4/2022.  No other areas of suspicious enhancement or morphology are seen in the left breast. I see no evidence for abnormal left breast skin, nipple or chest wall enhancement and there is no evidence for left axillary or internal mammary chain adenopathy.   There are no areas of suspicious enhancement or morphology in the right breast. I see no evidence for abnormal right breast skin, nipple or chest wall enhancement and there is no evidence for right axillary or internal mammary chain adenopathy.  BI-RADS 4: Suspicious.         Review of Systems:  See interval history.       Medications:    Current Outpatient Medications:   •  carvedilol (COREG) 25 MG tablet, TAKE 1 TABLET BY MOUTH TWICE A DAY WITH MEALS, Disp: 180 tablet, Rfl: 3  •  multivitamin (THERAGRAN) tablet tablet, Take 1 tablet by mouth Daily., Disp: , Rfl:   •  spironolactone (ALDACTONE) 25 MG tablet, TAKE 1 TABLET BY MOUTH EVERY DAY, Disp: 90 tablet, Rfl: 3  •  VITAMIN D PO, Take  by mouth Daily., Disp: , Rfl:       Allergies:  No Known Allergies      Family History    Problem Relation Age of Onset   • No Known Problems Father    • Hypotension Mother    • Breast cancer Neg Hx    • Ovarian cancer Neg Hx    • Uterine cancer Neg Hx    • Colon cancer Neg Hx        PHYSICAL EXAMINATION:   Vitals:    10/06/22 1111   BP: 126/74   Pulse: 69   Resp: 18   SpO2: 96%     ECOG 0 - Asymptomatic  General: NAD, well appearing  Psych: a&o x3, normal mood and affect  -Remainder of exam deferred.      I have independently reviewed her imaging and here are my findings:   and here are my findings:   In the left upper inner breast, there initially was a 5 mm mass seen on ultrasound associated with calcifications on mammogram. The clip is about 6 mm away from residual calcifications on postbiopsy mammogram.  On MRI, there is an 8 mm mass with biopsy clip located posteriorly. About 1 cm anterior to the mass, there is 6 mm of irregular enhancement on MRI in the location of the calcifications.      Assessment:  57 y.o. F with a diagnosis of left breast cancer: High grade, triple negative, invasive ductal carcinoma; clinical T1bN0, anatomic stage IA, prognostic stage IB. The cancer is at least 7 mm on core biopsy.  -She has a new diagnosis of a PALB2 mutation.    Discussion:  I explained that this mutation increases her lifetime risk of a second primary breast cancer. In this situation, I would recommend a bilateral mastectomy. She is in agreement with this plan. Because of her degree of ptosis, mastectomy will include removal of the nipple-areola-complex. She will see Dr. Agustin back to discuss options for reconstruction. She will still require port placement for adjuvant chemotherapy.    We again reviewed axillary staging. I described the procedure for sentinel lymph node biopsy in detail, including the preoperative injection of a radiotracer and intraoperative injection of lymphazurin blue dye. I explained that this is a mapping test and not a cancer test, that all of the lymph nodes containing  these dyes will be removed for complete testing by pathology, and that the results could impact the decision for adjuvant treatment or additional surgery. In her case, if multiple sentinel nodes are positive for cancer intraoperatively, I will proceed with a completion dissection. Even if the sentinel nodes are negative intraoperatively, there is a ~10-15% risk that they are positive on final pathology (false negative rate). In this case, she may require a second operation for completion dissection versus axillary radiation. I explained that axillary node dissection is associated with an increased risk of lymphedema versus sentinel lymph node biopsy (15-30% vs. <10%), that there is an increased risk of permanent upper inner arm numbness, and a risk of injury to motor nerves that control the shoulder muscles.      I described additional risks and potential complications associated with surgery, including, but not limited to, bleeding, infection, pneumothorax, port malfunction, complications related to blue dye, lymphedema, deformity/poor cosmetic result, chronic pain, neurovascular injury, numbness, seroma, hematoma, deep venous thrombosis, skin flap necrosis, disease recurrence and the possibility of requiring additional surgery. We also discussed other treatment options including the option of not undergoing any surgical treatment and the risks associated with this including disease progression. She expressed an understanding of these factors and wished to proceed.    Plan:  -Genetics clinic referral.  -See Dr. Agustin again preoperatively to discuss new surgical plan.  -Port placement, bilateral skin-sparing mastectomy and left sentinel lymph node biopsy, possible axillary dissection, with immediate reconstruction.    Manisha Holley MD    I spent 40 minutes caring for Bebe on this date of service. This time includes time spent by me in the following activities: preparing for the visit, counseling and  educating the patient/family/caregiver, referring and communicating with other health care professionals , documenting information in the medical record and independently interpreting results and communicating that information with the patient/family/caregiver.      CC:  MD Kevan Bonilla MD Avital Hahn, MD Ryan Wermeling, MD

## 2022-10-10 ENCOUNTER — NURSE NAVIGATOR (OUTPATIENT)
Dept: OTHER | Facility: HOSPITAL | Age: 57
End: 2022-10-10

## 2022-10-10 ENCOUNTER — PRE-ADMISSION TESTING (OUTPATIENT)
Dept: PREADMISSION TESTING | Facility: HOSPITAL | Age: 57
End: 2022-10-10

## 2022-10-10 VITALS
OXYGEN SATURATION: 100 % | HEART RATE: 68 BPM | BODY MASS INDEX: 35 KG/M2 | RESPIRATION RATE: 16 BRPM | WEIGHT: 205 LBS | HEIGHT: 64 IN | DIASTOLIC BLOOD PRESSURE: 79 MMHG | SYSTOLIC BLOOD PRESSURE: 123 MMHG | TEMPERATURE: 97.4 F

## 2022-10-10 DIAGNOSIS — C50.212 MALIGNANT NEOPLASM OF UPPER-INNER QUADRANT OF LEFT BREAST IN FEMALE, ESTROGEN RECEPTOR NEGATIVE: ICD-10-CM

## 2022-10-10 DIAGNOSIS — Z17.1 MALIGNANT NEOPLASM OF UPPER-INNER QUADRANT OF LEFT BREAST IN FEMALE, ESTROGEN RECEPTOR NEGATIVE: ICD-10-CM

## 2022-10-10 LAB — QT INTERVAL: 379 MS

## 2022-10-10 PROCEDURE — 93005 ELECTROCARDIOGRAM TRACING: CPT

## 2022-10-10 PROCEDURE — 93010 ELECTROCARDIOGRAM REPORT: CPT | Performed by: INTERNAL MEDICINE

## 2022-10-10 NOTE — PROGRESS NOTES
Called Ms. Kiser to see how she was doing after her follow up appointment Oct 6th. She stated she has questions she would like to discuss, but it was not a good time at the moment. She stated she would call me tomorrow at a more convenient time. She has my contact information.

## 2022-10-10 NOTE — DISCHARGE INSTRUCTIONS
Take the following medications the morning of surgery:  CARVEDILOL    ARRIVE FOR SURGERY AT 9:00 AM      If you are on prescription narcotic pain medication to control your pain you may also take that medication the morning of surgery.    General Instructions:  Do not eat solid food after midnight the night before surgery.  You may drink clear liquids day of surgery but must stop at least one hour before your hospital arrival time.  It is beneficial for you to have a clear drink that contains carbohydrates the day of surgery.  We suggest a 12 to 20 ounce bottle of Gatorade or Powerade for non-diabetic patients or a 12 to 20 ounce bottle of G2 or Powerade Zero for diabetic patients. (Pediatric patients, are not advised to drink a 12 to 20 ounce carbohydrate drink)    Clear liquids are liquids you can see through.  Nothing red in color.     Plain water                               Sports drinks  Sodas                                   Gelatin (Jell-O)  Fruit juices without pulp such as white grape juice and apple juice  Popsicles that contain no fruit or yogurt  Tea or coffee (no cream or milk added)  Gatorade / Powerade  G2 / Powerade Zero    Infants may have breast milk up to four hours before surgery.  Infants drinking formula may drink formula up to six hours before surgery.   Patients who avoid smoking, chewing tobacco and alcohol for 4 weeks prior to surgery have a reduced risk of post-operative complications.  Quit smoking as many days before surgery as you can.  Do not smoke, use chewing tobacco or drink alcohol the day of surgery.   If applicable bring your C-PAP/ BI-PAP machine.  Bring any papers given to you in the doctor’s office.  Wear clean comfortable clothes.  Do not wear contact lenses, false eyelashes or make-up.  Bring a case for your glasses.   Bring crutches or walker if applicable.  Remove all piercings.  Leave jewelry and any other valuables at home.  Hair extensions with metal clips must be  removed prior to surgery.  The Pre-Admission Testing nurse will instruct you to bring medications if unable to obtain an accurate list in Pre-Admission Testing.        If you were given a blood bank ID arm band remember to bring it with you the day of surgery.    Preventing a Surgical Site Infection:  For 2 to 3 days before surgery, avoid shaving with a razor because the razor can irritate skin and make it easier to develop an infection.    Any areas of open skin can increase the risk of a post-operative wound infection by allowing bacteria to enter and travel throughout the body.  Notify your surgeon if you have any skin wounds / rashes even if it is not near the expected surgical site.  The area will need assessed to determine if surgery should be delayed until it is healed.  The night prior to surgery shower using a fresh bar of anti-bacterial soap (such as Dial) and clean washcloth.  Sleep in a clean bed with clean clothing.  Do not allow pets to sleep with you.  Shower on the morning of surgery using a fresh bar of anti-bacterial soap (such as Dial) and clean washcloth.  Dry with a clean towel and dress in clean clothing.  Ask your surgeon if you will be receiving antibiotics prior to surgery.  Make sure you, your family, and all healthcare providers clean their hands with soap and water or an alcohol based hand  before caring for you or your wound.    Day of surgery:  Your arrival time is approximately two hours before your scheduled surgery time.  Upon arrival, a Pre-op nurse and Anesthesiologist will review your health history, obtain vital signs, and answer questions you may have.  The only belongings needed at this time will be a list of your home medications and if applicable your C-PAP/BI-PAP machine.  A Pre-op nurse will start an IV and you may receive medication in preparation for surgery, including something to help you relax.     Please be aware that surgery does come with discomfort.  We  want to make every effort to control your discomfort so please discuss any uncontrolled symptoms with your nurse.   Your doctor will most likely have prescribed pain medications.      If you are going home after surgery you will receive individualized written care instructions before being discharged.  A responsible adult must drive you to and from the hospital on the day of your surgery and stay with you for 24 hours.  Discharge prescriptions can be filled by the hospital pharmacy during regular pharmacy hours.  If you are having surgery late in the day/evening your prescription may be e-prescribed to your pharmacy.  Please verify your pharmacy hours or chose a 24 hour pharmacy to avoid not having access to your prescription because your pharmacy has closed for the day.    If you are staying overnight following surgery, you will be transported to your hospital room following the recovery period.  [facility] has all private rooms.    If you have any questions please call Pre-Admission Testing at [phone].  Deductibles and co-payments are collected on the day of service. Please be prepared to pay the required co-pay, deductible or deposit on the day of service as defined by your plan.    Call your surgeon immediately if you experience any of the following symptoms:  Sore Throat  Shortness of Breath or difficulty breathing  Cough  Chills  Body soreness or muscle pain  Headache  Fever  New loss of taste or smell  Do not arrive for your surgery ill.  Your procedure will need to be rescheduled to another time.  You will need to call your physician before the day of surgery to avoid any unnecessary exposure to hospital staff as well as other patients.

## 2022-10-12 ENCOUNTER — NURSE NAVIGATOR (OUTPATIENT)
Dept: OTHER | Facility: HOSPITAL | Age: 57
End: 2022-10-12

## 2022-10-12 NOTE — PROGRESS NOTES
Called Ms. iKser to see how she was doing. Left a voicemail with my contact information asking to call back at her convenience.

## 2022-10-18 ENCOUNTER — APPOINTMENT (OUTPATIENT)
Dept: GENERAL RADIOLOGY | Facility: HOSPITAL | Age: 57
End: 2022-10-18

## 2022-10-18 ENCOUNTER — ANESTHESIA EVENT (OUTPATIENT)
Dept: PERIOP | Facility: HOSPITAL | Age: 57
End: 2022-10-18

## 2022-10-18 ENCOUNTER — HOSPITAL ENCOUNTER (OUTPATIENT)
Facility: HOSPITAL | Age: 57
Discharge: HOME | End: 2022-10-19
Attending: SURGERY | Admitting: SURGERY

## 2022-10-18 ENCOUNTER — HOSPITAL ENCOUNTER (OUTPATIENT)
Dept: MAMMOGRAPHY | Facility: HOSPITAL | Age: 57
End: 2022-10-18

## 2022-10-18 ENCOUNTER — ANESTHESIA (OUTPATIENT)
Dept: PERIOP | Facility: HOSPITAL | Age: 57
End: 2022-10-18

## 2022-10-18 ENCOUNTER — HOSPITAL ENCOUNTER (OUTPATIENT)
Dept: NUCLEAR MEDICINE | Facility: HOSPITAL | Age: 57
Discharge: HOME | End: 2022-10-18

## 2022-10-18 DIAGNOSIS — Z17.1 MALIGNANT NEOPLASM OF UPPER-INNER QUADRANT OF LEFT BREAST IN FEMALE, ESTROGEN RECEPTOR NEGATIVE: ICD-10-CM

## 2022-10-18 DIAGNOSIS — C50.212 MALIGNANT NEOPLASM OF UPPER-INNER QUADRANT OF LEFT BREAST IN FEMALE, ESTROGEN RECEPTOR NEGATIVE: ICD-10-CM

## 2022-10-18 DIAGNOSIS — R92.8 ABNORMAL MAMMOGRAM: ICD-10-CM

## 2022-10-18 PROCEDURE — 76937 US GUIDE VASCULAR ACCESS: CPT | Performed by: SPECIALIST/TECHNOLOGIST, OTHER

## 2022-10-18 PROCEDURE — 76937 US GUIDE VASCULAR ACCESS: CPT | Performed by: SURGERY

## 2022-10-18 PROCEDURE — 88360 TUMOR IMMUNOHISTOCHEM/MANUAL: CPT | Performed by: SURGERY

## 2022-10-18 PROCEDURE — 25010000002 FENTANYL CITRATE (PF) 50 MCG/ML SOLUTION: Performed by: NURSE ANESTHETIST, CERTIFIED REGISTERED

## 2022-10-18 PROCEDURE — 19303 MAST SIMPLE COMPLETE: CPT | Performed by: SURGERY

## 2022-10-18 PROCEDURE — 19303 MAST SIMPLE COMPLETE: CPT | Performed by: SPECIALIST/TECHNOLOGIST, OTHER

## 2022-10-18 PROCEDURE — 88332 PATH CONSLTJ SURG EA ADD BLK: CPT | Performed by: SURGERY

## 2022-10-18 PROCEDURE — 25010000002 DEXAMETHASONE PER 1 MG: Performed by: NURSE ANESTHETIST, CERTIFIED REGISTERED

## 2022-10-18 PROCEDURE — 25010000002 CEFAZOLIN IN DEXTROSE 2-4 GM/100ML-% SOLUTION: Performed by: NURSE ANESTHETIST, CERTIFIED REGISTERED

## 2022-10-18 PROCEDURE — G0378 HOSPITAL OBSERVATION PER HR: HCPCS

## 2022-10-18 PROCEDURE — 36561 INSERT TUNNELED CV CATH: CPT | Performed by: SPECIALIST/TECHNOLOGIST, OTHER

## 2022-10-18 PROCEDURE — 88331 PATH CONSLTJ SURG 1 BLK 1SPC: CPT | Performed by: SURGERY

## 2022-10-18 PROCEDURE — 36561 INSERT TUNNELED CV CATH: CPT | Performed by: SURGERY

## 2022-10-18 PROCEDURE — 25010000002 PROPOFOL 10 MG/ML EMULSION: Performed by: NURSE ANESTHETIST, CERTIFIED REGISTERED

## 2022-10-18 PROCEDURE — 38900 IO MAP OF SENT LYMPH NODE: CPT | Performed by: SURGERY

## 2022-10-18 PROCEDURE — 88342 IMHCHEM/IMCYTCHM 1ST ANTB: CPT | Performed by: SURGERY

## 2022-10-18 PROCEDURE — 71045 X-RAY EXAM CHEST 1 VIEW: CPT

## 2022-10-18 PROCEDURE — 88307 TISSUE EXAM BY PATHOLOGIST: CPT | Performed by: SURGERY

## 2022-10-18 PROCEDURE — 88341 IMHCHEM/IMCYTCHM EA ADD ANTB: CPT | Performed by: SURGERY

## 2022-10-18 PROCEDURE — 25010000002 CEFAZOLIN IN DEXTROSE 2-4 GM/100ML-% SOLUTION: Performed by: SURGERY

## 2022-10-18 PROCEDURE — 76000 FLUOROSCOPY <1 HR PHYS/QHP: CPT

## 2022-10-18 PROCEDURE — 25010000002 NEOSTIGMINE 5 MG/10ML SOLUTION: Performed by: NURSE ANESTHETIST, CERTIFIED REGISTERED

## 2022-10-18 PROCEDURE — A9520 TC99 TILMANOCEPT DIAG 0.5MCI: HCPCS | Performed by: SURGERY

## 2022-10-18 PROCEDURE — 77001 FLUOROGUIDE FOR VEIN DEVICE: CPT | Performed by: SURGERY

## 2022-10-18 PROCEDURE — 25010000002 FENTANYL CITRATE (PF) 100 MCG/2ML SOLUTION: Performed by: NURSE ANESTHETIST, CERTIFIED REGISTERED

## 2022-10-18 PROCEDURE — 38900 IO MAP OF SENT LYMPH NODE: CPT | Performed by: SPECIALIST/TECHNOLOGIST, OTHER

## 2022-10-18 PROCEDURE — 78195 LYMPH SYSTEM IMAGING: CPT | Performed by: SURGERY

## 2022-10-18 PROCEDURE — 25010000002 ISOSULFAN BLUE 1 % SOLUTION: Performed by: SURGERY

## 2022-10-18 PROCEDURE — C1788 PORT, INDWELLING, IMP: HCPCS | Performed by: SURGERY

## 2022-10-18 PROCEDURE — 25010000002 HYDROMORPHONE PER 4 MG: Performed by: NURSE ANESTHETIST, CERTIFIED REGISTERED

## 2022-10-18 PROCEDURE — 38792 RA TRACER ID OF SENTINL NODE: CPT

## 2022-10-18 PROCEDURE — 0 TECHETIUM TC99M TILMANOCEPT: Performed by: SURGERY

## 2022-10-18 PROCEDURE — 77001 FLUOROGUIDE FOR VEIN DEVICE: CPT | Performed by: SPECIALIST/TECHNOLOGIST, OTHER

## 2022-10-18 PROCEDURE — 25010000002 ONDANSETRON PER 1 MG: Performed by: NURSE ANESTHETIST, CERTIFIED REGISTERED

## 2022-10-18 PROCEDURE — 25010000002 HEPARIN (PORCINE) PER 1000 UNITS: Performed by: SURGERY

## 2022-10-18 PROCEDURE — 78195 LYMPH SYSTEM IMAGING: CPT | Performed by: SPECIALIST/TECHNOLOGIST, OTHER

## 2022-10-18 PROCEDURE — 38525 BIOPSY/REMOVAL LYMPH NODES: CPT | Performed by: SURGERY

## 2022-10-18 DEVICE — PRT INTRO VASC/INTERV VORTEX FILL/HL DETACH/POLYURET/CATH 8F: Type: IMPLANTABLE DEVICE | Site: SUBCLAVIAN | Status: FUNCTIONAL

## 2022-10-18 DEVICE — LIGACLIP MCA MULTIPLE CLIP APPLIERS, 20 SMALL CLIPS
Type: IMPLANTABLE DEVICE | Site: BREAST | Status: FUNCTIONAL
Brand: LIGACLIP

## 2022-10-18 RX ORDER — CARVEDILOL 25 MG/1
25 TABLET ORAL 2 TIMES DAILY WITH MEALS
Status: DISCONTINUED | OUTPATIENT
Start: 2022-10-18 | End: 2022-10-19 | Stop reason: HOSPADM

## 2022-10-18 RX ORDER — CEFAZOLIN SODIUM 2 G/100ML
INJECTION, SOLUTION INTRAVENOUS AS NEEDED
Status: DISCONTINUED | OUTPATIENT
Start: 2022-10-18 | End: 2022-10-18 | Stop reason: SURG

## 2022-10-18 RX ORDER — DIPHENHYDRAMINE HCL 25 MG
25 CAPSULE ORAL
Status: DISCONTINUED | OUTPATIENT
Start: 2022-10-18 | End: 2022-10-18 | Stop reason: HOSPADM

## 2022-10-18 RX ORDER — DIPHENHYDRAMINE HYDROCHLORIDE 50 MG/ML
12.5 INJECTION INTRAMUSCULAR; INTRAVENOUS
Status: DISCONTINUED | OUTPATIENT
Start: 2022-10-18 | End: 2022-10-18 | Stop reason: HOSPADM

## 2022-10-18 RX ORDER — GABAPENTIN 300 MG/1
300 CAPSULE ORAL ONCE
Status: COMPLETED | OUTPATIENT
Start: 2022-10-18 | End: 2022-10-18

## 2022-10-18 RX ORDER — PROMETHAZINE HYDROCHLORIDE 25 MG/1
25 SUPPOSITORY RECTAL ONCE AS NEEDED
Status: DISCONTINUED | OUTPATIENT
Start: 2022-10-18 | End: 2022-10-18 | Stop reason: HOSPADM

## 2022-10-18 RX ORDER — FAMOTIDINE 20 MG/1
20 TABLET, FILM COATED ORAL
Status: COMPLETED | OUTPATIENT
Start: 2022-10-18 | End: 2022-10-18

## 2022-10-18 RX ORDER — LABETALOL HYDROCHLORIDE 5 MG/ML
5 INJECTION, SOLUTION INTRAVENOUS
Status: DISCONTINUED | OUTPATIENT
Start: 2022-10-18 | End: 2022-10-18 | Stop reason: HOSPADM

## 2022-10-18 RX ORDER — FENTANYL CITRATE 50 UG/ML
INJECTION, SOLUTION INTRAMUSCULAR; INTRAVENOUS AS NEEDED
Status: DISCONTINUED | OUTPATIENT
Start: 2022-10-18 | End: 2022-10-18 | Stop reason: SURG

## 2022-10-18 RX ORDER — SODIUM CHLORIDE 0.9 % (FLUSH) 0.9 %
3-10 SYRINGE (ML) INJECTION AS NEEDED
Status: DISCONTINUED | OUTPATIENT
Start: 2022-10-18 | End: 2022-10-18 | Stop reason: HOSPADM

## 2022-10-18 RX ORDER — OXYCODONE HYDROCHLORIDE 5 MG/1
5 TABLET ORAL EVERY 4 HOURS PRN
Status: DISCONTINUED | OUTPATIENT
Start: 2022-10-18 | End: 2022-10-19 | Stop reason: HOSPADM

## 2022-10-18 RX ORDER — DIAZEPAM 5 MG/1
10 TABLET ORAL ONCE
Status: COMPLETED | OUTPATIENT
Start: 2022-10-18 | End: 2022-10-18

## 2022-10-18 RX ORDER — BUPIVACAINE HYDROCHLORIDE AND EPINEPHRINE 2.5; 5 MG/ML; UG/ML
INJECTION, SOLUTION INFILTRATION; PERINEURAL AS NEEDED
Status: DISCONTINUED | OUTPATIENT
Start: 2022-10-18 | End: 2022-10-18 | Stop reason: HOSPADM

## 2022-10-18 RX ORDER — MAGNESIUM HYDROXIDE 1200 MG/15ML
LIQUID ORAL AS NEEDED
Status: DISCONTINUED | OUTPATIENT
Start: 2022-10-18 | End: 2022-10-18 | Stop reason: HOSPADM

## 2022-10-18 RX ORDER — DOCUSATE SODIUM 100 MG/1
100 CAPSULE, LIQUID FILLED ORAL 2 TIMES DAILY
Status: DISCONTINUED | OUTPATIENT
Start: 2022-10-18 | End: 2022-10-19 | Stop reason: HOSPADM

## 2022-10-18 RX ORDER — HYDROMORPHONE HYDROCHLORIDE 1 MG/ML
0.5 INJECTION, SOLUTION INTRAMUSCULAR; INTRAVENOUS; SUBCUTANEOUS
Status: DISCONTINUED | OUTPATIENT
Start: 2022-10-18 | End: 2022-10-18 | Stop reason: HOSPADM

## 2022-10-18 RX ORDER — ONDANSETRON 4 MG/1
4 TABLET, FILM COATED ORAL EVERY 6 HOURS PRN
Status: DISCONTINUED | OUTPATIENT
Start: 2022-10-18 | End: 2022-10-19 | Stop reason: HOSPADM

## 2022-10-18 RX ORDER — OXYCODONE AND ACETAMINOPHEN 7.5; 325 MG/1; MG/1
1 TABLET ORAL EVERY 4 HOURS PRN
Status: DISCONTINUED | OUTPATIENT
Start: 2022-10-18 | End: 2022-10-18 | Stop reason: HOSPADM

## 2022-10-18 RX ORDER — FAMOTIDINE 10 MG/ML
20 INJECTION, SOLUTION INTRAVENOUS
Status: COMPLETED | OUTPATIENT
Start: 2022-10-18 | End: 2022-10-18

## 2022-10-18 RX ORDER — SPIRONOLACTONE 25 MG/1
25 TABLET ORAL DAILY
Status: DISCONTINUED | OUTPATIENT
Start: 2022-10-19 | End: 2022-10-19 | Stop reason: HOSPADM

## 2022-10-18 RX ORDER — LIDOCAINE HYDROCHLORIDE 20 MG/ML
INJECTION, SOLUTION EPIDURAL; INFILTRATION; INTRACAUDAL; PERINEURAL AS NEEDED
Status: DISCONTINUED | OUTPATIENT
Start: 2022-10-18 | End: 2022-10-18 | Stop reason: SURG

## 2022-10-18 RX ORDER — ACETAMINOPHEN 500 MG
1000 TABLET ORAL ONCE
Status: COMPLETED | OUTPATIENT
Start: 2022-10-18 | End: 2022-10-18

## 2022-10-18 RX ORDER — FENTANYL CITRATE 50 UG/ML
50 INJECTION, SOLUTION INTRAMUSCULAR; INTRAVENOUS
Status: DISCONTINUED | OUTPATIENT
Start: 2022-10-18 | End: 2022-10-18 | Stop reason: HOSPADM

## 2022-10-18 RX ORDER — ACETAMINOPHEN 500 MG
1000 TABLET ORAL EVERY 8 HOURS
Status: DISCONTINUED | OUTPATIENT
Start: 2022-10-18 | End: 2022-10-19 | Stop reason: HOSPADM

## 2022-10-18 RX ORDER — NEOSTIGMINE METHYLSULFATE 0.5 MG/ML
INJECTION, SOLUTION INTRAVENOUS AS NEEDED
Status: DISCONTINUED | OUTPATIENT
Start: 2022-10-18 | End: 2022-10-18 | Stop reason: SURG

## 2022-10-18 RX ORDER — OXYCODONE HYDROCHLORIDE 5 MG/1
10 TABLET ORAL EVERY 4 HOURS PRN
Status: DISCONTINUED | OUTPATIENT
Start: 2022-10-18 | End: 2022-10-19 | Stop reason: HOSPADM

## 2022-10-18 RX ORDER — CEFAZOLIN SODIUM 2 G/100ML
2 INJECTION, SOLUTION INTRAVENOUS ONCE
Status: COMPLETED | OUTPATIENT
Start: 2022-10-18 | End: 2022-10-18

## 2022-10-18 RX ORDER — FLUMAZENIL 0.1 MG/ML
0.2 INJECTION INTRAVENOUS AS NEEDED
Status: DISCONTINUED | OUTPATIENT
Start: 2022-10-18 | End: 2022-10-18 | Stop reason: HOSPADM

## 2022-10-18 RX ORDER — NALOXONE HCL 0.4 MG/ML
0.2 VIAL (ML) INJECTION AS NEEDED
Status: DISCONTINUED | OUTPATIENT
Start: 2022-10-18 | End: 2022-10-18 | Stop reason: HOSPADM

## 2022-10-18 RX ORDER — CELECOXIB 200 MG/1
200 CAPSULE ORAL ONCE
Status: COMPLETED | OUTPATIENT
Start: 2022-10-18 | End: 2022-10-18

## 2022-10-18 RX ORDER — GLYCOPYRROLATE 0.2 MG/ML
INJECTION INTRAMUSCULAR; INTRAVENOUS AS NEEDED
Status: DISCONTINUED | OUTPATIENT
Start: 2022-10-18 | End: 2022-10-18 | Stop reason: SURG

## 2022-10-18 RX ORDER — CEFAZOLIN SODIUM 2 G/100ML
2 INJECTION, SOLUTION INTRAVENOUS EVERY 8 HOURS
Status: COMPLETED | OUTPATIENT
Start: 2022-10-18 | End: 2022-10-19

## 2022-10-18 RX ORDER — PROPOFOL 10 MG/ML
VIAL (ML) INTRAVENOUS AS NEEDED
Status: DISCONTINUED | OUTPATIENT
Start: 2022-10-18 | End: 2022-10-18 | Stop reason: SURG

## 2022-10-18 RX ORDER — MIDAZOLAM HYDROCHLORIDE 1 MG/ML
1 INJECTION INTRAMUSCULAR; INTRAVENOUS
Status: DISCONTINUED | OUTPATIENT
Start: 2022-10-18 | End: 2022-10-18 | Stop reason: HOSPADM

## 2022-10-18 RX ORDER — IBUPROFEN 600 MG/1
600 TABLET ORAL ONCE AS NEEDED
Status: DISCONTINUED | OUTPATIENT
Start: 2022-10-18 | End: 2022-10-18 | Stop reason: HOSPADM

## 2022-10-18 RX ORDER — ONDANSETRON 2 MG/ML
4 INJECTION INTRAMUSCULAR; INTRAVENOUS EVERY 6 HOURS PRN
Status: DISCONTINUED | OUTPATIENT
Start: 2022-10-18 | End: 2022-10-19 | Stop reason: HOSPADM

## 2022-10-18 RX ORDER — ONDANSETRON 2 MG/ML
4 INJECTION INTRAMUSCULAR; INTRAVENOUS ONCE AS NEEDED
Status: DISCONTINUED | OUTPATIENT
Start: 2022-10-18 | End: 2022-10-18 | Stop reason: HOSPADM

## 2022-10-18 RX ORDER — SODIUM CHLORIDE, SODIUM LACTATE, POTASSIUM CHLORIDE, CALCIUM CHLORIDE 600; 310; 30; 20 MG/100ML; MG/100ML; MG/100ML; MG/100ML
9 INJECTION, SOLUTION INTRAVENOUS CONTINUOUS
Status: DISCONTINUED | OUTPATIENT
Start: 2022-10-18 | End: 2022-10-18

## 2022-10-18 RX ORDER — ONDANSETRON 2 MG/ML
INJECTION INTRAMUSCULAR; INTRAVENOUS AS NEEDED
Status: DISCONTINUED | OUTPATIENT
Start: 2022-10-18 | End: 2022-10-18 | Stop reason: SURG

## 2022-10-18 RX ORDER — SCOLOPAMINE TRANSDERMAL SYSTEM 1 MG/1
1 PATCH, EXTENDED RELEASE TRANSDERMAL ONCE
Status: DISCONTINUED | OUTPATIENT
Start: 2022-10-18 | End: 2022-10-18

## 2022-10-18 RX ORDER — HYDRALAZINE HYDROCHLORIDE 20 MG/ML
5 INJECTION INTRAMUSCULAR; INTRAVENOUS
Status: DISCONTINUED | OUTPATIENT
Start: 2022-10-18 | End: 2022-10-18 | Stop reason: HOSPADM

## 2022-10-18 RX ORDER — PROMETHAZINE HYDROCHLORIDE 25 MG/1
25 TABLET ORAL ONCE AS NEEDED
Status: DISCONTINUED | OUTPATIENT
Start: 2022-10-18 | End: 2022-10-18 | Stop reason: HOSPADM

## 2022-10-18 RX ORDER — ROCURONIUM BROMIDE 10 MG/ML
INJECTION, SOLUTION INTRAVENOUS AS NEEDED
Status: DISCONTINUED | OUTPATIENT
Start: 2022-10-18 | End: 2022-10-18 | Stop reason: SURG

## 2022-10-18 RX ORDER — SODIUM CHLORIDE 0.9 % (FLUSH) 0.9 %
3 SYRINGE (ML) INJECTION EVERY 12 HOURS SCHEDULED
Status: DISCONTINUED | OUTPATIENT
Start: 2022-10-18 | End: 2022-10-18 | Stop reason: HOSPADM

## 2022-10-18 RX ORDER — DEXAMETHASONE SODIUM PHOSPHATE 4 MG/ML
INJECTION, SOLUTION INTRA-ARTICULAR; INTRALESIONAL; INTRAMUSCULAR; INTRAVENOUS; SOFT TISSUE AS NEEDED
Status: DISCONTINUED | OUTPATIENT
Start: 2022-10-18 | End: 2022-10-18 | Stop reason: SURG

## 2022-10-18 RX ORDER — HYDROCODONE BITARTRATE AND ACETAMINOPHEN 7.5; 325 MG/1; MG/1
1 TABLET ORAL ONCE AS NEEDED
Status: DISCONTINUED | OUTPATIENT
Start: 2022-10-18 | End: 2022-10-18 | Stop reason: HOSPADM

## 2022-10-18 RX ORDER — EPHEDRINE SULFATE 50 MG/ML
5 INJECTION, SOLUTION INTRAVENOUS ONCE AS NEEDED
Status: DISCONTINUED | OUTPATIENT
Start: 2022-10-18 | End: 2022-10-18 | Stop reason: HOSPADM

## 2022-10-18 RX ORDER — LIDOCAINE HYDROCHLORIDE 10 MG/ML
0.5 INJECTION, SOLUTION EPIDURAL; INFILTRATION; INTRACAUDAL; PERINEURAL ONCE AS NEEDED
Status: DISCONTINUED | OUTPATIENT
Start: 2022-10-18 | End: 2022-10-18 | Stop reason: HOSPADM

## 2022-10-18 RX ORDER — HEPARIN SODIUM 5000 [USP'U]/ML
5000 INJECTION, SOLUTION INTRAVENOUS; SUBCUTANEOUS
Status: COMPLETED | OUTPATIENT
Start: 2022-10-18 | End: 2022-10-18

## 2022-10-18 RX ORDER — LIDOCAINE AND PRILOCAINE 25; 25 MG/G; MG/G
1 CREAM TOPICAL ONCE
Status: COMPLETED | OUTPATIENT
Start: 2022-10-18 | End: 2022-10-18

## 2022-10-18 RX ORDER — GABAPENTIN 100 MG/1
100 CAPSULE ORAL 3 TIMES DAILY
Status: DISCONTINUED | OUTPATIENT
Start: 2022-10-18 | End: 2022-10-19 | Stop reason: HOSPADM

## 2022-10-18 RX ORDER — ISOSULFAN BLUE 50 MG/5ML
INJECTION, SOLUTION SUBCUTANEOUS AS NEEDED
Status: DISCONTINUED | OUTPATIENT
Start: 2022-10-18 | End: 2022-10-18 | Stop reason: HOSPADM

## 2022-10-18 RX ADMIN — FENTANYL CITRATE 50 MCG: 50 INJECTION INTRAMUSCULAR; INTRAVENOUS at 17:41

## 2022-10-18 RX ADMIN — SODIUM CHLORIDE, POTASSIUM CHLORIDE, SODIUM LACTATE AND CALCIUM CHLORIDE: 600; 310; 30; 20 INJECTION, SOLUTION INTRAVENOUS at 15:55

## 2022-10-18 RX ADMIN — SODIUM CHLORIDE, POTASSIUM CHLORIDE, SODIUM LACTATE AND CALCIUM CHLORIDE 9 ML/HR: 600; 310; 30; 20 INJECTION, SOLUTION INTRAVENOUS at 17:58

## 2022-10-18 RX ADMIN — TILMANOCEPT 1 DOSE: KIT at 11:15

## 2022-10-18 RX ADMIN — FENTANYL CITRATE 50 MCG: 50 INJECTION, SOLUTION INTRAMUSCULAR; INTRAVENOUS at 13:14

## 2022-10-18 RX ADMIN — HEPARIN SODIUM 5000 UNITS: 5000 INJECTION INTRAVENOUS; SUBCUTANEOUS at 13:03

## 2022-10-18 RX ADMIN — LIDOCAINE HYDROCHLORIDE 60 MG: 20 INJECTION, SOLUTION EPIDURAL; INFILTRATION; INTRACAUDAL; PERINEURAL at 13:17

## 2022-10-18 RX ADMIN — LIDOCAINE AND PRILOCAINE 1 APPLICATION: 25; 25 CREAM TOPICAL at 09:50

## 2022-10-18 RX ADMIN — CEFAZOLIN SODIUM 2 G: 2 INJECTION, SOLUTION INTRAVENOUS at 20:40

## 2022-10-18 RX ADMIN — CEFAZOLIN SODIUM 2 G: 2 INJECTION, SOLUTION INTRAVENOUS at 17:02

## 2022-10-18 RX ADMIN — NEOSTIGMINE METHYLSULFATE 3 MG: 0.5 INJECTION INTRAVENOUS at 17:05

## 2022-10-18 RX ADMIN — ACETAMINOPHEN 1000 MG: 500 TABLET ORAL at 11:50

## 2022-10-18 RX ADMIN — FENTANYL CITRATE 50 MCG: 50 INJECTION, SOLUTION INTRAMUSCULAR; INTRAVENOUS at 14:30

## 2022-10-18 RX ADMIN — GABAPENTIN 100 MG: 100 CAPSULE ORAL at 20:39

## 2022-10-18 RX ADMIN — DIAZEPAM 10 MG: 5 TABLET ORAL at 09:56

## 2022-10-18 RX ADMIN — DOCUSATE SODIUM 100 MG: 100 CAPSULE, LIQUID FILLED ORAL at 20:39

## 2022-10-18 RX ADMIN — DEXAMETHASONE SODIUM PHOSPHATE 8 MG: 4 INJECTION, SOLUTION INTRA-ARTICULAR; INTRALESIONAL; INTRAMUSCULAR; INTRAVENOUS; SOFT TISSUE at 15:27

## 2022-10-18 RX ADMIN — GLYCOPYRROLATE 0.6 MCG: 1 INJECTION INTRAMUSCULAR; INTRAVENOUS at 17:05

## 2022-10-18 RX ADMIN — SODIUM CHLORIDE, POTASSIUM CHLORIDE, SODIUM LACTATE AND CALCIUM CHLORIDE 9 ML/HR: 600; 310; 30; 20 INJECTION, SOLUTION INTRAVENOUS at 11:06

## 2022-10-18 RX ADMIN — HYDROMORPHONE HYDROCHLORIDE 0.5 MG: 1 INJECTION, SOLUTION INTRAMUSCULAR; INTRAVENOUS; SUBCUTANEOUS at 17:57

## 2022-10-18 RX ADMIN — CEFAZOLIN SODIUM 2 G: 2 INJECTION, SOLUTION INTRAVENOUS at 13:02

## 2022-10-18 RX ADMIN — PROPOFOL 200 MG: 10 INJECTION, EMULSION INTRAVENOUS at 13:17

## 2022-10-18 RX ADMIN — CARVEDILOL 25 MG: 25 TABLET, FILM COATED ORAL at 20:40

## 2022-10-18 RX ADMIN — GABAPENTIN 300 MG: 300 CAPSULE ORAL at 11:50

## 2022-10-18 RX ADMIN — FAMOTIDINE 20 MG: 10 INJECTION INTRAVENOUS at 11:50

## 2022-10-18 RX ADMIN — SCOPALAMINE 1 PATCH: 1 PATCH, EXTENDED RELEASE TRANSDERMAL at 11:50

## 2022-10-18 RX ADMIN — ACETAMINOPHEN 1000 MG: 500 TABLET ORAL at 20:39

## 2022-10-18 RX ADMIN — ROCURONIUM BROMIDE 40 MG: 50 INJECTION INTRAVENOUS at 13:17

## 2022-10-18 RX ADMIN — CELECOXIB 200 MG: 200 CAPSULE ORAL at 11:50

## 2022-10-18 RX ADMIN — ONDANSETRON 4 MG: 2 INJECTION INTRAMUSCULAR; INTRAVENOUS at 17:07

## 2022-10-18 NOTE — OP NOTE
Operative Report    Patient Name:  Bebe Kiser  YOB: 1965    Date of Surgery:  10/18/2022    Pre-op Diagnosis:   Malignant neoplasm of upper-inner quadrant of left breast in female, estrogen receptor negative (HCC) [C50.212, Z17.1]       Post-Op Diagnosis:  Malignant neoplasm of upper-inner quadrant of left breast in female, estrogen receptor negative (HCC) [C50.212, Z17.1]    Procedures:  1. Port placement  2. Bilateral skin-sparing mastectomy   3. Left axillary sentinel lymph node biopsy      Staff:  Surgeon(s):  Manisha Holley MD    Assistant: Carmel Nelson CSA  was responsible for performing the following activities: Retraction, Suction, Irrigation and Suturing and their skilled assistance was necessary for the success of this case.    Anesthesia: General    Estimated Blood Loss: 40 mL    Implants:    Implant Name Type Inv. Item Serial No.  Lot No. LRB No. Used Action   CLIPAPPLR M/ ENDO LIGACLIP 9 3/8IN SM - [id number] Implant CLIPAPPLR M/ ENDO LIGACLIP 9 3/8IN   [employer]  DIV OF J AND J . N/A 1 Implanted   PRT INTRO VASC/INTERV VORTEX FILL/HL DETACH/POLYURET/CATH 8F - [id number] Implant PRT INTRO VASC/INTERV VORTEX FILL/HL DETACH/POLYURET/CATH 8F  [employer] [id number] Right 1 Implanted       Specimen:          Specimens     ID Source Type Tests Collected By Collected At Frozen?    A Breast, Right Tissue · TISSUE PATHOLOGY EXAM   Manisha Holley MD 10/18/22 1514 No    Description: RIGHT BREAST, STITCH AT 12 O CLOCK    Comment: FRESH FOR PERMANENT  980 GRAMS    B [place] Lymph Node Tissue · TISSUE PATHOLOGY EXAM   Manisha Holley MD 10/18/22 1610 Yes    Description: LEFT AXILLA  SENTINEL LYMPH NODE #1 HOT BLUE COUNT 1600    Comment: CALL OR 12 X [id number]    C [place] Lymph Node Tissue · TISSUE PATHOLOGY EXAM   Manisha Holley MD 10/18/22 1619 Yes    Description: LEFT AXILLA  SENTINEL LYMPH NODE #2 HOT NOT BLUE COUNT 500    Comment: CALL OR 12 X  [id number]    D Breast, Left Tissue · TISSUE PATHOLOGY EXAM   Manisha Holley MD 10/18/22 1639 No    Description: LEFT BREAST, STITCH AT 12 O CLOCK    Comment: FRESH FOR PERMANENT  1170 GRAMS            Findings:   Successful right IJ port placement with tip at the cavoatrial junction.   Intraoperative frozen section pathology evaluation of sentinel nodes was negative for malignancy.    Complications: None     Indications: The patient is a 57 y.o. F with a diagnosis of left breast cancer: High grade, triple negative, invasive ductal carcinoma; clinical T1bN0, anatomic stage IA, prognostic stage IB. She also has a new diagnosis of a PALB2 mutation. Her treatment will include adjuvant chemotherapy. She has decided to proceed with delayed reconstruction with a direct implant approach, for this reason intentionally long skin flaps will be left. The risks and benefits of surgery were discussed preoperatively and she understood and agreed to proceed.     Description of Procedure:  Preoperatively, in the nuclear medicine suite, the left periareolar skin was injected with Lymphoseek. The patient was identified in the preoperative area and brought to the operating room and placed supine on the table. Patient verification was performed and general anesthesia was induced. The left breast was cleansed with an alcohol prep and 5 ml of lymphazurin blue was injected in the retroareolar area. She tolerated this well. The patient was prepped and draped in sterile fashion with the left arm prepped into the field. A time out was performed and all were in agreement. I confirmed that the patient had received preoperative antibiotics and chemical prophylaxis.     I began with the port placement. The patient was placed in Trendelenburg position and I used ultrasound to identify the right internal jugular vein. The skin above this area was infiltrated with local anesthesia. A small stab incision was made at the site of needle entry. The  right internal jugular vein was accessed with the needle on the first stick. The guidewire was advanced using a Seldinger technique with no resistance. I checked wire placement with ultrasound and visualized the wire within the vein. Wire location was then verified using fluoroscopy. A transverse incision was made on the right chest two finger breadths below the clavicle and an anterior chest wall pocket was created. The port was placed inside with a snug fit and two 2-0 prolene stitches were placed from the chest wall to each side of the port. These were tagged with hemostats. The catheter was tunneled to the area of wire entry in the neck. The 2-0 prolene was then tied.     Fluoroscopy was used to verify wire placement and to tailor the length of the catheter. The patient was again placed in Trendelenburg position and I advanced the dilator and introducer over the guidewire under fluoroscopic guidance. The dilator and the wire were removed and the catheter was placed into the introducer, which was then peeled away. Fluoroscopy demonstrated good placement of the port with no kinks in the catheter. I accessed the port and it had excellent backflow and flushed easily. The chest incision was closed with interrupted 3-0 Vicryl deep dermal sutures and a running subcuticular 4-0 Monocryl suture. The neck incision was closed with a single interrupted 3-0 Vicryl deep dermal suture. Dermabond was applied to both wounds.     I then proceeded with the right mastectomy. Preoperatively I had marked bilateral elliptical skin sparing incisions. I made an incision through the skin and subcutaneous tissue. I created tissue flaps using electrocautery superiorly to the level of the clavicle, laterally to the anterior axillary line, inferiorly to the inframammary fold, and medially to the sternal border. Once the flaps were raised the breast was carefully removed from the pectoralis major and serratus using electrocautery. The  specimen was marked with a short superior stitch (12:00) and handed off for pathology. The wound bed was then copiously irrigated and hemostasis achieved.     Next a pectoralis muscle block was performed. The interfascial plane between pectoralis major and minor was identified and 5 mL of 0.25% bupivacaine with epinephrine was injected into this space. Next the fascia overlying the serratus anterior, under pectoralis minor, was identified and this space was injected with 5 mL of local anesthetic. The pectoralis muscle and mastectomy flaps were coated with 5 mL of liquid thrombin. A 19 Fr YURIY drain was placed in the mastectomy cavity, brought out through the lateral chest wall skin inferior to the incision, and secured with a 3-0 nylon. The deep dermis was closed with interrupted 3-0 vicryl suture. The skin was closed with 4-0 subcuticular running monocryl and covered with dermabond.    I then proceeded with the left mastectomy. I made an incision through the skin and subcutaneous tissues. The superior flap was raised first in order to access the axilla. Once in the area of the axilla, the clavipectoral fascia was incised. The navigator gamma probe was used to identify the area of increased uptake. I dissected in the area of increased uptake, taking care to note any blue channels traveling in this direction. The area was pulled forward into the wound and the node was carefully dissected out and removed, taking care to clip all lymphatic channels. The node was hot and blue. Ex vivo counts were 1600. A second area of increased uptake was identified. This node was carefully dissected out and removed, taking care to clip all lymphatic channels. The node was hot, not blue. Ex vivo counts on SLN #2 measured 500. Background count was 15, there were no additional hot or blue nodes, and the node biopsy was terminated. The lymph nodes were sent for frozen section and intraoperative pathology evaluation was negative for  malignancy.    I then continued creation of tissue flaps using electrocautery superiorly to the level of the clavicle, medially to the sternal border, laterally to the anterior axillary line and inferiorly to the inframammary fold. Once the flaps were raised the breast was carefully removed from the pectoralis major and serratus using electrocautery. The specimen was marked with a short superior stitch (12:00) and handed off for pathology. The wound bed was then copiously irrigated and hemostasis achieved.     Next a pectoralis muscle block was performed. The interfascial plane between pectoralis major and minor was identified and 5 mL of 0.25% bupivacaine with epinephrine was injected into this space. Next the fascia overlying the serratus anterior, under pectoralis minor, was identified and this space was injected with 5 mL of local anesthetic. The pectoralis muscle and mastectomy flaps were coated with 5 mL of liquid thrombin. A 19 Fr YURIY drain was placed in the mastectomy cavity, brought out through the lateral chest wall skin inferior to the incision, and secured with a 3-0 nylon. The deep dermis was closed with interrupted 3-0 vicryl suture. The skin was closed with 4-0 subcuticular running monocryl and covered with dermabond. A SorbaView dressing was placed over the drain sites. The patient was placed in an Ace wrap which was stuffed with Kerlix. Counts were correct at the end of the case. The patient was awakened from anesthesia and taken to the PACU in stable condition.    Manisha Holley MD     Date: 10/18/2022  Time: 17:50 EDT

## 2022-10-18 NOTE — ANESTHESIA POSTPROCEDURE EVALUATION
"Patient: Bebe Kiser    Procedure Summary     Date: 10/18/22 Room / Location: [facility] OR [address] [place] [address] MAIN OR    Anesthesia Start: 1308 Anesthesia Stop: 1734    Procedures:       bilateral skin-sparing mastectomy and left sentinel lymph node biopsy (Bilateral: Breast)      Port placement (Right) Diagnosis:       Malignant neoplasm of upper-inner quadrant of left breast in female, estrogen receptor negative (HCC)      (Malignant neoplasm of upper-inner quadrant of left breast in female, estrogen receptor negative (HCC) [C50.212, Z17.1])    Surgeons: Manisha Holley MD Provider: Ti Ghosh MD    Anesthesia Type: general ASA Status: 2          Anesthesia Type: general    Vitals  Vitals Value Taken Time   /87 10/18/22 1831   Temp 36.5 °C (97.7 °F) 10/18/22 1815   Pulse 59 10/18/22 1832   Resp 16 10/18/22 1815   SpO2 99 % 10/18/22 1832   Vitals shown include unvalidated device data.        Post Anesthesia Care and Evaluation    Patient location during evaluation: bedside  Patient participation: complete - patient participated  Level of consciousness: awake and alert  Pain management: adequate    Airway patency: patent  Anesthetic complications: No anesthetic complications    Cardiovascular status: acceptable  Respiratory status: acceptable  Hydration status: acceptable    Comments: /85   Pulse 58   Temp 36.5 °C (97.7 °F) (Oral)   Resp 16   Ht 162.6 cm (64\")   Wt 92.5 kg (203 lb 14.8 oz)   LMP  (LMP Unknown)   SpO2 99%   BMI 35.00 kg/m²       "

## 2022-10-19 ENCOUNTER — READMISSION MANAGEMENT (OUTPATIENT)
Dept: CALL CENTER | Facility: HOSPITAL | Age: 57
End: 2022-10-19

## 2022-10-19 VITALS
OXYGEN SATURATION: 98 % | HEIGHT: 64 IN | HEART RATE: 68 BPM | DIASTOLIC BLOOD PRESSURE: 63 MMHG | TEMPERATURE: 97 F | BODY MASS INDEX: 34.82 KG/M2 | WEIGHT: 203.93 LBS | RESPIRATION RATE: 18 BRPM | SYSTOLIC BLOOD PRESSURE: 102 MMHG

## 2022-10-19 PROCEDURE — 99024 POSTOP FOLLOW-UP VISIT: CPT | Performed by: SURGERY

## 2022-10-19 PROCEDURE — 25010000002 CEFAZOLIN IN DEXTROSE 2-4 GM/100ML-% SOLUTION: Performed by: SURGERY

## 2022-10-19 PROCEDURE — G0378 HOSPITAL OBSERVATION PER HR: HCPCS

## 2022-10-19 RX ORDER — GABAPENTIN 100 MG/1
100 CAPSULE ORAL 3 TIMES DAILY
Qty: 42 CAPSULE | Refills: 0 | Status: SHIPPED | OUTPATIENT
Start: 2022-10-19 | End: 2022-11-02

## 2022-10-19 RX ORDER — ACETAMINOPHEN 500 MG
1000 TABLET ORAL EVERY 8 HOURS
Qty: 30 TABLET | Refills: 0 | Status: SHIPPED | OUTPATIENT
Start: 2022-10-19 | End: 2022-10-24

## 2022-10-19 RX ORDER — PSEUDOEPHEDRINE HCL 30 MG
100 TABLET ORAL 2 TIMES DAILY
Qty: 20 CAPSULE | Refills: 0 | Status: SHIPPED | OUTPATIENT
Start: 2022-10-19 | End: 2022-10-29

## 2022-10-19 RX ORDER — OXYCODONE HYDROCHLORIDE 5 MG/1
5 TABLET ORAL EVERY 6 HOURS PRN
Qty: 18 TABLET | Refills: 0 | Status: SHIPPED | OUTPATIENT
Start: 2022-10-19 | End: 2022-11-02

## 2022-10-19 RX ADMIN — CEFAZOLIN SODIUM 2 G: 2 INJECTION, SOLUTION INTRAVENOUS at 04:44

## 2022-10-19 RX ADMIN — ACETAMINOPHEN 1000 MG: 500 TABLET ORAL at 04:44

## 2022-10-19 RX ADMIN — CARVEDILOL 25 MG: 25 TABLET, FILM COATED ORAL at 08:25

## 2022-10-19 RX ADMIN — DOCUSATE SODIUM 100 MG: 100 CAPSULE, LIQUID FILLED ORAL at 08:25

## 2022-10-19 RX ADMIN — GABAPENTIN 100 MG: 100 CAPSULE ORAL at 08:25

## 2022-10-19 NOTE — PROGRESS NOTES
Case Management Discharge Note      Final Note: DC home no needs         Selected Continued Care - Discharged on 10/19/2022 Admission date: 10/18/2022 - Discharge disposition: Home or Self Care    Destination    No services have been selected for the patient.              Durable Medical Equipment    No services have been selected for the patient.              Dialysis/Infusion    No services have been selected for the patient.              Home Medical Care    No services have been selected for the patient.              Therapy    No services have been selected for the patient.              Community Resources    No services have been selected for the patient.              Community & DME    No services have been selected for the patient.                       Final Discharge Disposition Code: 01 - home or self-care

## 2022-10-19 NOTE — OUTREACH NOTE
Prep Survey    Flowsheet Row Responses   [facility] patient discharged from? [place]   Is LACE score < 7 ? Yes   Emergency Room discharge w/ pulse ox? No   Eligibility [facility]   Date of Admission 10/18/22   Date of Discharge 10/19/22   Discharge Disposition Home or Self Care   Discharge diagnosis Port placement,   Bilateral skin-sparing mastectomy    Does the patient have one of the following disease processes/diagnoses(primary or secondary)? General Surgery   Does the patient have Home health ordered? No   Is there a DME ordered? No   Prep survey completed? Yes          KAUSHAL DE - Registered Nurse

## 2022-10-19 NOTE — PLAN OF CARE
Goal Outcome Evaluation:  Plan of Care Reviewed With: patient        Progress: improving  Outcome Evaluation: VSS, A/O, on room air, dressing to chest is CDI, YURIY drains X 2 w/ bloody output, voiding, tolerating reg diet, no c/o pain or nausea, ambulated in hallway w/ staff, KVO for antibiotic, limb precautions maintained

## 2022-10-19 NOTE — PROGRESS NOTES
Breast Surgery Progress Note    Chief complaint: sp surgery    Subjective:  Ms. Kiser did well overnight. She has eaten, has been up out of bed, and her pain is controlled. Her right YURIY put out 45 mL since surgery, her left 115 mL, both serosanguineous.    Objective:  Vitals:    10/19/22 0825   BP:    Pulse: 64   Resp:    Temp:    SpO2:        Physical Exam:  General: NAD  HEENT: NCAT, EOMI  Lungs: respirations nonlabored  Chest: bilateral mastectomy incisions c/d/i with Dermabond, flaps healthy    Assessment:  57 y.o. female POD 1 port placement, bilateral mastectomy, and left SLNB    Plan:  -Drain teaching.  -Discharge home later this morning.   -She already has a follow-up appointment with me scheduled.  -I will call her with pathology results.    Manisha Holley MD

## 2022-10-20 ENCOUNTER — TRANSITIONAL CARE MANAGEMENT TELEPHONE ENCOUNTER (OUTPATIENT)
Dept: CALL CENTER | Facility: HOSPITAL | Age: 57
End: 2022-10-20

## 2022-10-21 ENCOUNTER — TELEPHONE (OUTPATIENT)
Dept: SURGERY | Facility: CLINIC | Age: 57
End: 2022-10-21

## 2022-10-21 ENCOUNTER — NURSE NAVIGATOR (OUTPATIENT)
Dept: OTHER | Facility: HOSPITAL | Age: 57
End: 2022-10-21

## 2022-10-21 LAB
LAB AP CASE REPORT: NORMAL
LAB AP CLINICAL INFORMATION: NORMAL
LAB AP DIAGNOSIS COMMENT: NORMAL
LAB AP SPECIAL STAINS: NORMAL
LAB AP SYNOPTIC CHECKLIST: NORMAL
Lab: NORMAL
PATH REPORT.FINAL DX SPEC: NORMAL
PATH REPORT.GROSS SPEC: NORMAL

## 2022-10-21 NOTE — TELEPHONE ENCOUNTER
I called Ms. Kiser to discuss her pathology report. She is recovering well from surgery. We we will call her next week to check on her drain outputs.     Manisha Holley MD

## 2022-10-21 NOTE — PROGRESS NOTES
Attempted to call MsVira Rashel to see how she is doing. One number was disconnected and her home phone was busy with no way to leave a voicemail. Will try again soon.

## 2022-10-27 ENCOUNTER — TELEPHONE (OUTPATIENT)
Dept: SURGERY | Facility: CLINIC | Age: 57
End: 2022-10-27

## 2022-10-27 NOTE — TELEPHONE ENCOUNTER
YURIY Drain Totals     OR Date: 10/18/2022    Post-OP: 11/2/2022      Left Drain:  Wed. 10/19- 50 cc  Thurs. 10/20- 130 cc  Fri. 10/21- 110 cc  Sat. 10/22- 90 cc  Sun. 10/23- 60 cc  Mon. 10/24- 30 cc  Tues. 10/25- 50 cc  Wed. 10/26- 40 cc    Right Drain:   Wed. 10/19- 40 cc  Thurs. 10/20- 110 cc  Fri. 10/21- 80 cc  Sat. 10/22- 50 cc  Sun. 10/23-40 cc   Mon. 10/24- 20 cc  Tues. 10/25- 30 cc  Wed. 10/26- 30 cc

## 2022-10-31 ENCOUNTER — TELEPHONE (OUTPATIENT)
Dept: GENETICS | Facility: HOSPITAL | Age: 57
End: 2022-10-31

## 2022-11-02 ENCOUNTER — LAB (OUTPATIENT)
Dept: LAB | Facility: HOSPITAL | Age: 57
End: 2022-11-02

## 2022-11-02 ENCOUNTER — TRANSCRIBE ORDERS (OUTPATIENT)
Dept: SURGERY | Facility: CLINIC | Age: 57
End: 2022-11-02

## 2022-11-02 ENCOUNTER — OFFICE VISIT (OUTPATIENT)
Dept: SURGERY | Facility: CLINIC | Age: 57
End: 2022-11-02

## 2022-11-02 ENCOUNTER — OFFICE VISIT (OUTPATIENT)
Dept: ONCOLOGY | Facility: CLINIC | Age: 57
End: 2022-11-02

## 2022-11-02 VITALS
DIASTOLIC BLOOD PRESSURE: 84 MMHG | HEART RATE: 86 BPM | BODY MASS INDEX: 34.66 KG/M2 | WEIGHT: 203 LBS | SYSTOLIC BLOOD PRESSURE: 140 MMHG | OXYGEN SATURATION: 98 % | HEIGHT: 64 IN | RESPIRATION RATE: 17 BRPM

## 2022-11-02 VITALS
RESPIRATION RATE: 18 BRPM | WEIGHT: 210.1 LBS | TEMPERATURE: 97.5 F | OXYGEN SATURATION: 99 % | HEART RATE: 74 BPM | BODY MASS INDEX: 35.87 KG/M2 | DIASTOLIC BLOOD PRESSURE: 77 MMHG | SYSTOLIC BLOOD PRESSURE: 120 MMHG | HEIGHT: 64 IN

## 2022-11-02 DIAGNOSIS — Z17.1 MALIGNANT NEOPLASM OF UPPER-INNER QUADRANT OF LEFT BREAST IN FEMALE, ESTROGEN RECEPTOR NEGATIVE: Primary | ICD-10-CM

## 2022-11-02 DIAGNOSIS — C50.212 MALIGNANT NEOPLASM OF UPPER-INNER QUADRANT OF LEFT BREAST IN FEMALE, ESTROGEN RECEPTOR NEGATIVE: ICD-10-CM

## 2022-11-02 DIAGNOSIS — Z48.89 POSTOPERATIVE VISIT: Primary | ICD-10-CM

## 2022-11-02 DIAGNOSIS — C50.212 MALIGNANT NEOPLASM OF UPPER-INNER QUADRANT OF LEFT BREAST IN FEMALE, ESTROGEN RECEPTOR NEGATIVE: Primary | ICD-10-CM

## 2022-11-02 DIAGNOSIS — Z17.1 MALIGNANT NEOPLASM OF UPPER-INNER QUADRANT OF LEFT BREAST IN FEMALE, ESTROGEN RECEPTOR NEGATIVE: ICD-10-CM

## 2022-11-02 PROBLEM — Z45.2 ENCOUNTER FOR FITTING AND ADJUSTMENT OF VASCULAR CATHETER: Status: ACTIVE | Noted: 2022-11-02

## 2022-11-02 PROCEDURE — 99024 POSTOP FOLLOW-UP VISIT: CPT | Performed by: SURGERY

## 2022-11-02 PROCEDURE — 99215 OFFICE O/P EST HI 40 MIN: CPT | Performed by: INTERNAL MEDICINE

## 2022-11-02 RX ORDER — HEPARIN SODIUM (PORCINE) LOCK FLUSH IV SOLN 100 UNIT/ML 100 UNIT/ML
500 SOLUTION INTRAVENOUS AS NEEDED
Status: CANCELLED | OUTPATIENT
Start: 2022-11-02

## 2022-11-02 RX ORDER — SODIUM CHLORIDE 0.9 % (FLUSH) 0.9 %
10 SYRINGE (ML) INJECTION AS NEEDED
Status: CANCELLED | OUTPATIENT
Start: 2022-11-02

## 2022-11-02 NOTE — PROGRESS NOTES
[facility]     Referring Provider: Fatoumata Hidalgo MD     Chief complaint: Postoperative visit      HPI:   9/8/2022:  Ms. Bebe Kiser is a 56 yo woman, seen at the request of Dr. Fatoumata Hidalgo, for a new diagnosis of left breast cancer. This was initially detected as an imaging abnormality on routine screening. Her most recent mammogram prior to this year was in 2016. Her work-up is detailed in the oncologic history below. Prior to the biopsy, she denies any breast lumps, pain, skin changes, or nipple discharge. She denies any prior history of abnormal mammograms or breast biopsies. She denies any family history of breast or ovarian cancer.    9/28/2022, Telephone Encounter:  I called Ms. Kiser to let her know that the MRI did not show any evidence of lymph node disease, so I think we are still okay with a surgery first approach.  However her genetic testing did show a PALB2 mutation conferring an increased risk of a second primary breast cancer.  In this situation I would recommend a bilateral mastectomy.  I will see her back next week to review the new surgical plan.  I have also let Dr. Agustin know.    10/6/2022:  She returns today to discuss a change in the surgery plan due to her newly diagnosed PALB2 mutation.      11/2/2022, Interval History:  She underwent bilateral mastectomy, left SLNB, and port placement on 10/18/22. See surgery & pathology details below in oncologic history. She has been recovering well and has no complaints. Drain outputs have been less than 20 mL on each side for the past few days. She was joined today in clinic by her daughter.       Oncology/Hematology History   Malignant neoplasm of upper-inner quadrant of left breast in female, estrogen receptor negative (HCC)   1/5/2016 Imaging    Bilateral Diagnostic MMG ([facility]):  The parenchyma of both breasts is largely fatty-replaced. I see no masses, areas of architectural distortion or skin thickening. There is no evidence for  axillary lymphadenopathy or nipple retraction.  BI-RADS 1: Negative.     8/3/2022 Initial Diagnosis    Malignant neoplasm of upper-inner quadrant of left breast in female, estrogen receptor negative (HCC)     8/4/2022 Imaging    Screening MMG with Gautam ([facility]):  The breasts are almost entirely fatty.   There is a new small, oval mass measuring 8 mm with indistinct margins and associated fine-linear calcifications seen in the middle one-third region of the left breast at 9 o'clock.  In the right breast, no suspicious masses, significant calcifications or other abnormalities are seen.  BI-RADS 0: Incomplete.      8/19/2022 Imaging    Left Diagnostic MMG with Gautam & Left Breast US ([facility]):   MMG:  On the present examination, there is a small, irregular mass measuring 6 mm with indistinct margins and associated fine-linear calcifications in the middle one-third 9:30 o'clock region of the left breast located 10 centimeters from the nipple.   US:   Ultrasound demonstrates an irregular heterogeneous solid mass with indistinct margins measuring 5 x 5 x 4 mm in the middle one-third 9:30 o'clock region of the left breast located 10 centimeters from the nipple.   BI-RADS 4C: Suspicious.      8/26/2022 Biopsy    Left Breast, US-Guided Biopsy ([facility]):    Left Breast, 9:30, Core Biopsies:   Invasive Mammary Carcinoma, No special type (Ductal)    Histologic Grade ([facility]):     Glandular/Tubular Score: 3     Nuclear Score: 3     Mitotic Score: 2     Overall Grade: 3 (High Grade)    Size: 0.7 cm    Lymphovascular Invasion: Not Identified     Microcalcifications: Microcalcifications in Invasive Carcinoma.   No DCIS is Identified.   -Clip projects up to 6 mm away from residual calcifications on postbiopsy mammogram.    ER negative (0%)  [place] negative (0%)  Her2 negative (IHC 1+)  Ki-67 58.07%     9/8/2022 Genetic Testing    Breast & Gyn Cancers Panel (36 genes):    PALB2 pathogenic mutation     9/27/2022 Imaging    Bilateral Breast MRI  ([facility]):  In the middle third upper inner quadrant of the left breast centered at the 10 o'clock position on the order of 7 cm from nipple there is an irregular enhancing mass that measures on the order of 0.7 cm in anterior to posterior dimension, 0.6 cm in the superior to inferior dimension and 0.8 cm in the medial to lateral dimension. A focal signal void is seen at the posterior margin of the lesion. This represents the biopsy-proven site of malignancy with the adjacent mini cork-shaped metallic clip. Below threshold nonspecific thin linear enhancement that measures up to 1 cm in length is seen at the posterior margin of the biopsy clip. No mammographic abnormality in this region is appreciated.  Located on the order of 1 cm anterior to the biopsy-proven malignancy at the 10 o'clock axis is a 0.6 cm ill-defined focus of enhancement. Mammographically there are faint microcalcifications that are seen in this region on the mammogram dated 08/19/2022, these can be seen on the spot magnification image and to a lesser degree on the mammogram dated 8/4/2022.  No other areas of suspicious enhancement or morphology are seen in the left breast. I see no evidence for abnormal left breast skin, nipple or chest wall enhancement and there is no evidence for left axillary or internal mammary chain adenopathy.   There are no areas of suspicious enhancement or morphology in the right breast. I see no evidence for abnormal right breast skin, nipple or chest wall enhancement and there is no evidence for right axillary or internal mammary chain adenopathy.  BI-RADS 4: Suspicious.     10/18/2022 Surgery    Port placement, bilateral skin-sparing mastectomy and left sentinel lymph node biopsy-    1. Right Breast, Simple Skin-Sparing Mastectomy (980 Grams):               A. Benign breast parenchyma with no significant histopathologic changes.               B. Unremarkable skin and nipple.                 2. Left Axillary [place]  Lymph Node #1, Hot and Blue, Count 1600, Biopsy (FS):               A. One lymph node, negative for carcinoma (0/1).                 3. Left Axillary [place] Lymph Node #2, Hot not Blue, Count 500, Biopsy (FS):               A. Two lymph nodes, negative for carcinoma (0/2).     4. Left Breast, Simple Skin-Sparing Mastectomy (1,170 Grams):               A. INVASIVE DUCTAL CARCINOMA, Poorly differentiated; [place] Histologic Grade III/III       (tubule score = 3, nuclear score = 3, mitoses score = 3):                            1. Tumor size: 8 mm (measured on slide 4D).                            2. Margins are negative for invasive carcinoma; Closest distance: Invasive carcinoma is       present 14 mm from the anterior surface.                            3. Negative for lymphovascular space invasion.               B. Clip and biopsy site changes are present and adjacent to invasive carcinoma.               C. Unremarkable skin and nipple.               D. See Synoptic Report (to include parts 2-3) and Comment.    ER negative (<1%)  SC negative (<1%)  Her2 negative (IHC 1+)  Ki-67 65%         Review of Systems:  See interval history.       Medications:    Current Outpatient Medications:   •  carvedilol (COREG) 25 MG tablet, TAKE 1 TABLET BY MOUTH TWICE A DAY WITH MEALS (Patient taking differently: Take 1 tablet by mouth 2 (Two) Times a Day With Meals.), Disp: 180 tablet, Rfl: 3  •  gabapentin (NEURONTIN) 100 MG capsule, Take 1 capsule by mouth 3 (Three) Times a Day for 14 days., Disp: 42 capsule, Rfl: 0  •  multivitamin (THERAGRAN) tablet tablet, Take 1 tablet by mouth Daily. HOLD FOR SURGERY, Disp: , Rfl:   •  spironolactone (ALDACTONE) 25 MG tablet, TAKE 1 TABLET BY MOUTH EVERY DAY (Patient taking differently: Take 1 tablet by mouth Daily.), Disp: 90 tablet, Rfl: 3  •  VITAMIN D PO, Take 1 tablet by mouth Daily., Disp: , Rfl:       Allergies:  No Known Allergies      Family History   Problem Relation Age of  Onset   • Hypotension Mother    • No Known Problems Father    • Breast cancer Neg Hx    • Ovarian cancer Neg Hx    • Uterine cancer Neg Hx    • Colon cancer Neg Hx    • Malig Hyperthermia Neg Hx        PHYSICAL EXAMINATION:   Vitals:    11/02/22 0812   BP: 140/84   Pulse: 86   Resp: 17   SpO2: 98%     ECOG 0 - Asymptomatic  General: NAD, well appearing  Psych: a&o x 3, normal mood and affect  Eyes: EOMI, no scleral icterus  ENMT: neck supple without masses or thyromegaly, mucus membranes moist  MSK: normal gait, normal ROM in bilateral shoulders  Lymph nodes: no axillary swelling  Breast:   Right: Sp mastectomy with well-healing incision. Flaps healthy.  Left: Sp mastectomy with well-healing incision. Flaps healthy.      Assessment:  57 y.o. F with a PALB2 mutation and a diagnosis of left breast cancer: High grade, triple negative, invasive ductal carcinoma. She underwent bilateral mastectomy, left SLNB, and port placement on 10/18/22, pT1bN0, anatomic stage IA, prognostic stage IB.    Plan:  -Continue follow-up with Dr. Bernardo.  -Continue follow-up with Dr. Agustin for delayed reconstruction.  -PT/lymphedema clinic evaluation in 2 weeks.  -Follow-up with me in 3 months for clinical exam.  -She was instructed to call sooner with any questions, concerns or changes on BSE.    Manisha Holley MD      CC:  MD Kevan Bonilla MD Avital Hahn, MD

## 2022-11-02 NOTE — PROGRESS NOTES
Subjective   Bebe Kiser is a 57 y.o. female.  Referred by Dr. Holley for left breast invasive ductal carcinoma, triple negative    History of Present Illness     Patient is a 57-year-old postmenopausal -American lady who presented with a screen detected abnormality of the left breast.    She denies any family history of breast cancer.  No previous abnormal mammograms or biopsies.  Comorbidities include hypertension.    8/4/2022-bilateral screening mammogram  Platelets are almost entirely fatty.  There is a new small, oval mass measuring 8 mm with indistinct margins, associated fine linear calcifications in the middle one third region of the left breast at 9:00.  No suspicious abnormalities of the right breast.    8/19/2022-left breast diagnostic mammogram  The breast is almost entirely fatty.  Additional evaluation of the left breast at 9:00 there is a small irregular mass measuring 6 mm with indistinct margins and associated fine linear calcifications in the middle one third region of the left breast, 10 cm from the nipple.    Left breast ultrasound  Ultrasound demonstrates an irregular heterogeneous mass with indistinct margins measuring 5 x 5 x 4 mm in the middle third, 9:30 position of the left breast, 10 cm from the nipple    Impression  Ultrasound-guided biopsy recommended.    8/25/2022-ultrasound-guided biopsy  Pathology consistent with invasive ductal carcinoma  Grade 3  Tumor size 0.7 cm  No lymphovascular space invasion  ER negative  [place] negative  HER2 1+  Ki-67 58.07%    9/8/2022-genetic testing ordered.  Positive for PAL B2 pathogenic mutation.    She was evaluated by Dr. Holley on 9/8/2022 and referred for discussion of neoadjuvant versus adjuvant therapy.    9/27/2022-bilateral breast MRI-biopsy-proven malignancy in the left breast at 10 o'clock position measures 0.8 cm.  0.6 cm ill-defined focus of enhancement 1 cm anterior to the biopsy-proven malignancy.  Mammographically microcalcifications  are seen.  Correlation with left breast biopsy or surgical excision of the calcifications is recommended.  No suspicious findings in the right breast.    10/18/2022-bilateral mastectomy  1.right breast simple skin sparing mastectomy-benign breast parenchyma with no significant changes.  2.left axillary sentinel lymph node 1-benign  3.left axillary sentinel lymph node 2-benign  4.left breast simple skin sparing mastectomy  A.invasive ductal carcinoma, poorly differentiated, grade 3  Tumor measures 8 mm  Margins are negative for invasive carcinoma  No lymphovascular space invasion  B.clip and biopsy site changes  Seen on unremarkable skin and nipple    Receptors repeated and confirmed to be ER negative, ID negative, HER2 1+ on immunohistochemistry  Ki-67 65%    Patient presents to the clinic today accompanied by her daughter to discuss adjuvant therapy recommendations.  She is recovering well from surgery.  No tissue expanders placed and plans to perform reconstruction following completion of chemotherapy      The following portions of the patient's history were reviewed and updated as appropriate: allergies, current medications, past family history, past medical history, past social history, past surgical history and problem list.    Past Medical History:   Diagnosis Date   • Breast cancer (HCC)     LEFT   • Elevated cholesterol    • Hypertension         Past Surgical History:   Procedure Laterality Date   • CATARACT EXTRACTION Bilateral 2019   • COLONOSCOPY      2013   • MASTECTOMY WITH SENTINEL NODE BIOPSY AND AXILLARY NODE DISSECTION Bilateral 10/18/2022    Procedure: bilateral skin-sparing mastectomy and left sentinel lymph node biopsy;  Surgeon: Manisha Holley MD;  Location: [facility];  Service: General;  Laterality: Bilateral;   • VENOUS ACCESS DEVICE (PORT) INSERTION Right 10/18/2022    Procedure: Port placement;  Surgeon: Manisha Holley MD;  Location: [facility];  Service: General;   "Laterality: Right;        Family History   Problem Relation Age of Onset   • Hypotension Mother    • No Known Problems Father    • Breast cancer Neg Hx    • Ovarian cancer Neg Hx    • Uterine cancer Neg Hx    • Colon cancer Neg Hx    • Malig Hyperthermia Neg Hx         Social History     Socioeconomic History   • Marital status:    • Number of children: 3   Tobacco Use   • Smoking status: Never   • Smokeless tobacco: Never   Vaping Use   • Vaping Use: Never used   Substance and Sexual Activity   • Alcohol use: Not Currently     Comment: OCCASIONAL   • Drug use: No   • Sexual activity: Yes     Partners: Male     Birth control/protection: Post-menopausal        OB History        2    Para   2    Term   2            AB        Living   2       SAB        IAB        Ectopic        Molar        Multiple        Live Births   2             Age at menarche-10   3 para 2  1  Age at first live childbirth-20  Age at menopause-47  All contraceptive pill use-30 years  Hormone replacement therapy-none    No Known Allergies         Review of Systems   Constitutional: Negative.    HENT: Negative.    Eyes: Negative.    Respiratory: Negative.    Cardiovascular: Negative.    Gastrointestinal: Negative.    Endocrine: Negative.    Genitourinary: Negative.    Musculoskeletal: Negative.    Allergic/Immunologic: Negative.    Neurological: Negative.    Hematological: Negative.    Psychiatric/Behavioral: The patient is nervous/anxious.      Review of systems as above    Objective   Blood pressure 120/77, pulse 74, temperature 97.5 °F (36.4 °C), temperature source Temporal, resp. rate 18, height 162.6 cm (64.02\"), weight 95.3 kg (210 lb 1.6 oz), SpO2 99 %, not currently breastfeeding.   Physical Exam  Vitals reviewed.   Constitutional:       Appearance: Normal appearance.   HENT:      Head: Normocephalic and atraumatic.      Nose: Nose normal.   Eyes:      Pupils: Pupils are equal, round, and reactive to " light.   Cardiovascular:      Rate and Rhythm: Normal rate and regular rhythm.      Pulses: Normal pulses.      Heart sounds: Normal heart sounds.   Pulmonary:      Effort: Pulmonary effort is normal.   Abdominal:      General: Abdomen is flat.   Musculoskeletal:         General: Normal range of motion.      Cervical back: Normal range of motion.   Skin:     General: Skin is warm.   Neurological:      General: No focal deficit present.      Mental Status: She is alert and oriented to person, place, and time.   Psychiatric:         Mood and Affect: Mood normal.         Behavior: Behavior normal.         Thought Content: Thought content normal.         Judgment: Judgment normal.       Breast Exam: Status post bilateral mastectomy without reconstruction.  No expanders.  Incisions healing well    Admission on 10/18/2022, Discharged on 10/19/2022   Component Date Value Ref Range Status   • Case Report 10/18/2022    Final                    Value:Surgical Pathology Report                         Case: [id number]                                  Authorizing Provider:  Manisha Holley MD    Collected:           10/18/2022 03:14 PM          Ordering Location:     [facility]  Received:            10/18/2022 03:24 PM                                 MAIN OR                                                                      Pathologist:           Fatoumata Montano MD                                                          Specimens:   1) - Breast, Right, RIGHT BREAST, STITCH AT 12 O CLOCK                                              2) - [place] Lymph Node, LEFT AXILLA  SENTINEL LYMPH NODE #1 HOT BLUE COUNT 1600                   3) - [place] Lymph Node, LEFT AXILLA  SENTINEL LYMPH NODE #2 HOT NOT BLUE COUNT 500                4) - Breast, Left, LEFT BREAST, STITCH AT 12 O CLOCK                                      • Clinical Information 10/18/2022    Final                    Value:This result contains  rich text formatting which cannot be displayed here.   • Final Diagnosis 10/18/2022    Final                    Value:This result contains rich text formatting which cannot be displayed here.   • Synoptic Checklist 10/18/2022    Final                    Value:INVASIVE CARCINOMA OF THE BREAST: Resection                            INVASIVE CARCINOMA OF THE BREAST: COMPLETE EXCISION - 4                            8th Edition - Protocol posted: 6/22/2022                                                        SPECIMEN                               Procedure:    Total mastectomy                                Specimen Laterality:    Left                                                         TUMOR                               Tumor Site:    Upper inner quadrant                                Histologic Type:    Invasive carcinoma of no special type (ductal)                                Histologic Grade (Mame Histologic Score):                                     Glandular (Acinar) / Tubular Differentiation:    Score 3                                  Nuclear Pleomorphism:    Score 3                                  Mitotic Rate:    Score 3                                  Overall Grade:    Grade 3 (scores of 8 or 9)                                Tumor Size:    Greatest dimension of largest invasive focus (Millimeters): 8 mm                               Tumor Focality:    Single focus of invasive carcinoma                                Ductal Carcinoma In Situ (DCIS):    Not identified                                Lobular Carcinoma In Situ (LCIS):    Not identified                                Lymphovascular Invasion:    Not identified                                Dermal Lymphovascular Invasion:    Not identified                                Microcalcifications:    Present in invasive carcinoma                                Treatment Effect in the Breast:    No known presurgical therapy                                                          MARGINS                               Margin Status for Invasive Carcinoma:    All margins negative for invasive carcinoma                                  Distance from Invasive Carcinoma to Closest Margin:    14 mm                                 Closest Margin(s) to Invasive Carcinoma:    Anterior                                                         REGIONAL LYMPH NODES                               Regional Lymph Node Status:                                     :    All regional lymph nodes negative for tumor                                  Total Number of Lymph Nodes Examined (sentinel and non-sentinel):    3                                  Number of [place] Nodes Examined:    3                                                         PATHOLOGIC STAGE CLASSIFICATION (pTNM, AJCC 8th Edition)                               Reporting of pT, pN, and (when applicable) pM categories is based on information available to the pathologist at the time the report is issued. As per the AJCC (Chapter 1, 8th Ed.) it is the managing physician's responsibility to establish the final pathologic stage based upon all pertinent information, including but potentially not limited to this pathology report.                               pT Category:    pT1b                                Regional Lymph Nodes Modifier:    (sn): [place] node(s) evaluated.                                pN Category:    pN0                                                            Breast Biomarker Testing Performed on Previous Biopsy:                                     Estrogen Receptor (ER) Status:    Negative                                Breast Biomarker Testing Performed on Previous Biopsy:                                     Progesterone Receptor (PgR) Status:    Negative                                Breast Biomarker Testing Performed on Previous Biopsy:                                     HER2 (by  immunohistochemistry):    Negative (Score 1+)                                Breast Biomarker Testing Performed on Previous Biopsy:                                     Ki-67 Percentage of Positive Nuclei:    58 %                                 Testing Performed on Case Number:    [id number]                             Breast Biomarker Reporting Template                            BREAST: BIOMARKER REPORTING TEMPLATE - 4                            Protocol posted: 6/22/2022                                                           Test(s) Performed:                                     Estrogen Receptor (ER) Status:    Negative (less than 1%)                                    :    Internal control cells present and stain as expected                                  Test Type:    Food and Drug Administration (FDA) cleared (test / vendor): [facility] [facility]                                  Primary Antibody:    SP1                                  Scoring System:    Harjinder                                    Proportion Score:    0                                    Intensity Score:    0                                    Total Harjinder Score:    0                                Test(s) Performed:                                     Progesterone Receptor (PgR) Status:    Negative (less than 1%)                                    :    Internal control cells present and stain as expected                                  Test Type:    Food and Drug Administration (FDA) cleared (test / vendor): [facility] [facility]                                  Primary Antibody:    1E2                                  Scoring System:    Harjinder                                    Proportion Score:    0                                    Intensity Score:    0                                    Total Harjinder Score:    0                                Test(s) Performed:                                     HER2 by Immunohistochemistry:    Negative (Score 1+)                                   Test Type:    Food and Drug Administration (FDA) cleared (test / vendor): [employer]                                  Primary Antibody:    4B5                                Test(s) Performed:    Ki-67                                  Ki-67 Percentage of Positive Nuclei:    65 %                                 Primary Antibody:    30-9                                Cold Ischemia and Fixation Times:    Meet requirements specified in latest version of the ASCO / CAP Guidelines                                Cold Ischemia Time (minutes):    10 min                               Fixation Time (hours):    28 hours                               Testing Performed on Block Number(s):    4D                                                         METHODS                               Fixative:    Formalin      • Comment 10/18/2022    Final                    Value:This result contains rich text formatting which cannot be displayed here.   • Intraoperative Consultation 10/18/2022    Final                    Value:This result contains rich text formatting which cannot be displayed here.   • Gross Description 10/18/2022    Final                    Value:This result contains rich text formatting which cannot be displayed here.   • Special Stains 10/18/2022    Final                    Value:This result contains rich text formatting which cannot be displayed here.   Pre-Admission Testing on 10/10/2022   Component Date Value Ref Range Status   • QT Interval 10/10/2022 379  ms Final        XR Chest 1 View    Result Date: 10/18/2022  Chest port placement. No pneumothorax.  This report was finalized on 10/18/2022 6:04 PM by Dr. Roberto Moss M.D.           Assessment & Plan       *Left breast invasive ductal carcinoma  · Grade 3, ER/MD negative, HER2 1+ on immunohistochemistry, Ki-67 58%  · Tumor measures 8 mm on mammogram and 5 mm on ultrasound  · On the biopsy the tumor measures 0.7 cm.  · MRI of the breast  confirmed the tumor to be 0.8 mm.  · Status post bilateral mastectomy on 10/18/2022.  · Pathology consistent with invasive ductal carcinoma of the left breast measuring 8 mm, triple negative, grade 3, Ki-67 65%  · pT1b N0 M0, stage Ia clinical, stage Ib prognostic  · We discussed the benefit of chemotherapy and tumors which are over 5 mm and triple negative.  · Given that the tumor is under 1 cm we will proceed with Taxotere and cyclophosphamide.  · Adverse effects of chemotherapy including but not limited to myelosuppression, increased risk of infections, nausea, vomiting, hypersensitivity reactions, risk of extravasation, neurotoxicity, arthralgias and myalgias, diarrhea, constipation, mucositis discussed.  · We will start chemotherapy on 11/14/2022  · She will be scheduled to see APRN on 11/21/2022 for administration of 1 L of normal saline on cycle 1 day 8  · Chemotherapy education will be scheduled  · I will plan to see her on cycle 2 of TC  · Labs from September 2022 reviewed and normal    *Pathogenic PAL B2 mutation  · We discussed the autosomal dominant inheritance of the PAL B2 mutation.  · Her daughter who is accompanying her to the visit is scheduled to undergo testing today.  · We also discussed the penetrance and the screening recommendations.  · She thinks that her paternal grandmother might have had pancreatic cancer but unsure.  · We discussed the increased risk of pancreatic cancer associated PAL B2 mutation.  Discussed screening recommendations of annual MRCP or EUS for pancreatic cancer starting at the age of 50.  · Given that there is no clear first-degree relative with pancreatic cancer she would not meet guidelines recommendation for screening for pancreatic cancer.  · We discussed the signs and symptoms of pancreatic cancer including nausea, vomiting, epigastric pain and discomfort, abdominal distention, weight loss  · She does not have a family history of ovarian cancer hence there is no  clear indication for risk reducing salpingo-oophorectomy.  Recommend that she discuss this further with her GYN.    *Hypertension-blood pressure 120/77    *Obesity-BMI 36    *Follow-up-chemotherapy education  Cycle 1 TC 11/14/2022  Cycle 1 day 8 toxicity check 11/21/2022, 1 L normal saline on the same day  Follow-up with me on 12/5/2022     49 and spent on the encounter including reviewing the medical records, discussing chemotherapy recommendations, face-to-face time and documentation on the same day

## 2022-11-04 ENCOUNTER — APPOINTMENT (OUTPATIENT)
Dept: LAB | Facility: HOSPITAL | Age: 57
End: 2022-11-04

## 2022-11-04 ENCOUNTER — OFFICE VISIT (OUTPATIENT)
Dept: ONCOLOGY | Facility: CLINIC | Age: 57
End: 2022-11-04

## 2022-11-04 VITALS
WEIGHT: 206.3 LBS | SYSTOLIC BLOOD PRESSURE: 110 MMHG | TEMPERATURE: 97.1 F | OXYGEN SATURATION: 100 % | HEART RATE: 65 BPM | DIASTOLIC BLOOD PRESSURE: 74 MMHG | BODY MASS INDEX: 35.22 KG/M2 | HEIGHT: 64 IN

## 2022-11-04 DIAGNOSIS — C50.212 MALIGNANT NEOPLASM OF UPPER-INNER QUADRANT OF LEFT BREAST IN FEMALE, ESTROGEN RECEPTOR NEGATIVE: Primary | ICD-10-CM

## 2022-11-04 DIAGNOSIS — Z17.1 MALIGNANT NEOPLASM OF UPPER-INNER QUADRANT OF LEFT BREAST IN FEMALE, ESTROGEN RECEPTOR NEGATIVE: Primary | ICD-10-CM

## 2022-11-04 PROCEDURE — 99215 OFFICE O/P EST HI 40 MIN: CPT | Performed by: NURSE PRACTITIONER

## 2022-11-04 RX ORDER — PANTOPRAZOLE SODIUM 40 MG/1
40 TABLET, DELAYED RELEASE ORAL DAILY
Qty: 30 TABLET | Refills: 3 | Status: SHIPPED | OUTPATIENT
Start: 2022-11-04 | End: 2023-01-30

## 2022-11-04 RX ORDER — DEXAMETHASONE 4 MG/1
TABLET ORAL
Qty: 48 TABLET | Refills: 0 | Status: SHIPPED | OUTPATIENT
Start: 2022-11-04

## 2022-11-04 RX ORDER — ONDANSETRON HYDROCHLORIDE 8 MG/1
8 TABLET, FILM COATED ORAL 3 TIMES DAILY PRN
Qty: 30 TABLET | Refills: 5 | Status: SHIPPED | OUTPATIENT
Start: 2022-11-04

## 2022-11-04 RX ORDER — LIDOCAINE AND PRILOCAINE 25; 25 MG/G; MG/G
1 CREAM TOPICAL AS NEEDED
Qty: 30 G | Refills: 1 | Status: SHIPPED | OUTPATIENT
Start: 2022-11-04

## 2022-11-04 NOTE — PROGRESS NOTES
TREATMENT  PREPARATION    Bebe Kiser  [id number]  1965    Chief Complaint: Treatment preparation and needs assessment    History of present illness:  Bebe Kiser is a 57 y.o. year old female who is here today for treatment preparation and needs assessment.  The patient has been diagnosed with   Encounter Diagnosis   Name Primary?   • Malignant neoplasm of upper-inner quadrant of left breast in female, estrogen receptor negative (HCC) Yes    and is scheduled to begin treatment with:     Oncology History:    Oncology/Hematology History   Malignant neoplasm of upper-inner quadrant of left breast in female, estrogen receptor negative (HCC)   1/5/2016 Imaging    Bilateral Diagnostic MMG ([facility]):  The parenchyma of both breasts is largely fatty-replaced. I see no masses, areas of architectural distortion or skin thickening. There is no evidence for axillary lymphadenopathy or nipple retraction.  BI-RADS 1: Negative.     8/3/2022 Initial Diagnosis    Malignant neoplasm of upper-inner quadrant of left breast in female, estrogen receptor negative (HCC)     8/4/2022 Imaging    Screening MMG with Gautam ([facility]):  The breasts are almost entirely fatty.   There is a new small, oval mass measuring 8 mm with indistinct margins and associated fine-linear calcifications seen in the middle one-third region of the left breast at 9 o'clock.  In the right breast, no suspicious masses, significant calcifications or other abnormalities are seen.  BI-RADS 0: Incomplete.      8/19/2022 Imaging    Left Diagnostic MMG with Gautam & Left Breast US ([facility]):   MMG:  On the present examination, there is a small, irregular mass measuring 6 mm with indistinct margins and associated fine-linear calcifications in the middle one-third 9:30 o'clock region of the left breast located 10 centimeters from the nipple.   US:   Ultrasound demonstrates an irregular heterogeneous solid mass with indistinct margins measuring 5 x 5 x 4 mm in the middle one-third  9:30 o'clock region of the left breast located 10 centimeters from the nipple.   BI-RADS 4C: Suspicious.      8/26/2022 Biopsy    Left Breast, US-Guided Biopsy ([facility]):    Left Breast, 9:30, Core Biopsies:   Invasive Mammary Carcinoma, No special type (Ductal)    Histologic Grade (Mame):     Glandular/Tubular Score: 3     Nuclear Score: 3     Mitotic Score: 2     Overall Grade: 3 (High Grade)    Size: 0.7 cm    Lymphovascular Invasion: Not Identified     Microcalcifications: Microcalcifications in Invasive Carcinoma.   No DCIS is Identified.   -Clip projects up to 6 mm away from residual calcifications on postbiopsy mammogram.    ER negative (0%)  LA negative (0%)  Her2 negative (IHC 1+)  Ki-67 58.07%     9/8/2022 Genetic Testing    Breast & Gyn Cancers Panel (36 genes):    PALB2 pathogenic mutation     9/27/2022 Imaging    [facility]  In the middle third upper inner quadrant of the left breast centered at the 10 o'clock position on the order of 7 cm from nipple there is an irregular enhancing mass that measures on the order of 0.7 cm in anterior to posterior dimension, 0.6 cm in the superior to inferior dimension and 0.8 cm in the medial to lateral dimension. A focal signal void is seen at the posterior margin of the lesion. This represents the biopsy-proven site of malignancy with the adjacent mini cork-shaped metallic clip. Below threshold nonspecific thin linear enhancement that measures up to 1 cm in length is seen at the posterior margin of the biopsy clip. No mammographic abnormality in this region is appreciated.  Located on the order of 1 cm anterior to the biopsy-proven malignancy at the 10 o'clock axis is a 0.6 cm ill-defined focus of enhancement. Mammographically there are faint microcalcifications that are seen in this region on the mammogram dated 08/19/2022, these can be seen on the spot magnification image and to a lesser degree on the mammogram dated 8/4/2022.  No other areas  of suspicious enhancement or morphology are seen in the left breast. I see no evidence for abnormal left breast skin, nipple or chest wall enhancement and there is no evidence for left axillary or internal mammary chain adenopathy.   There are no areas of suspicious enhancement or morphology in the right breast. I see no evidence for abnormal right breast skin, nipple or chest wall enhancement and there is no evidence for right axillary or internal mammary chain adenopathy.  BI-RADS 4: Suspicious.     10/18/2022 Surgery    Port placement, bilateral skin-sparing mastectomy and left sentinel lymph node biopsy-    1. Right Breast, Simple Skin-Sparing Mastectomy (980 Grams):               A. Benign breast parenchyma with no significant histopathologic changes.               B. Unremarkable skin and nipple.                 2. Left Axillary [place] Lymph Node #1, Hot and Blue, Count 1600, Biopsy (FS):               A. One lymph node, negative for carcinoma (0/1).                 3. Left Axillary [place] Lymph Node #2, Hot not Blue, Count 500, Biopsy (FS):               A. Two lymph nodes, negative for carcinoma (0/2).     4. Left Breast, Simple Skin-Sparing Mastectomy (1,170 Grams):               A. INVASIVE DUCTAL CARCINOMA, Poorly differentiated; Forbes Histologic Grade III/III       (tubule score = 3, nuclear score = 3, mitoses score = 3):                            1. Tumor size: 8 mm (measured on slide 4D).                            2. Margins are negative for invasive carcinoma; Closest distance: Invasive carcinoma is       present 14 mm from the anterior surface.                            3. Negative for lymphovascular space invasion.               B. Clip and biopsy site changes are present and adjacent to invasive carcinoma.               C. Unremarkable skin and nipple.               D. See Synoptic Report (to include parts 2-3) and Comment.    ER negative (<1%)  NE negative (<1%)  Her2 negative (IHC  1+)  Ki-67 65%     11/14/2022 -  Chemotherapy    OP BREAST TC DOCEtaxel / Cyclophosphamide         The current medication list and allergy list were reviewed and reconciled.     Past Medical History, Past Surgical History, Social History, Family History have been reviewed and are without significant changes except as mentioned.    Physical Exam:    Vitals:    11/04/22 1059   BP: 110/74   Pulse: 65   Temp: 97.1 °F (36.2 °C)   SpO2: 100%     Vitals:    11/04/22 1059   PainSc: 0-No pain        ECOG score: 0           Physical Exam  HENT:      Head: Normocephalic and atraumatic.   Eyes:      Extraocular Movements: Extraocular movements intact.      Conjunctiva/sclera: Conjunctivae normal.   Pulmonary:      Effort: Pulmonary effort is normal. No respiratory distress.   Neurological:      General: No focal deficit present.      Mental Status: She is alert and oriented to person, place, and time.   Psychiatric:         Mood and Affect: Mood normal.         Behavior: Behavior normal.           NEEDS ASSESSMENTS    Genetics  The patient's new diagnosis and family history have been reviewed for genetic counseling needs. A genetic referral is recommended. Previously referred.    Psychosocial and Barriers to care  The patient has completed a PHQ-9 Depression Screening and the Distress Thermometer (DT) today.  PHQ-9 results show PHQ-2 Total Score:   PHQ-9 Total Score: PHQ-9 Total Score:       The patient scored their distress today as Distress Level: 5 on a scale of 0-10 with 0 being no distress and 10 being extreme distress. Problems marked by the patient as being an issue for them within the last week include Practical Problems  Treatment decisions: Yes .      Results were reviewed along with psychosocial resources offered by our cancer center.  Our Supportive Oncology team will be flagged for a score of 4 or above, and a same day call will be made for a score of 9 or 10.  A mental health referral is offered at that time.  Patients who score less than 4 have been educated on our support services and can be referred to our  upon request.  The patient will be referred to our .       Nutrition  The patient has completed the malnutrition screening today. They scored Malnutrition Screening Tool  Have you recently lost weight without trying?  If yes, how much weight have you lost?: 0--> No  Have you been eating poorly because of a decreased appetite?: 0--> No  MST score: 0   with a score of 0-1 meaning not at risk in a score of 2 or greater meaning at risk.  Patients with a score of 3 or higher will be referred to our oncology dietitian for support. Patients beginning at risk treatment regimens or who have dietary concerns will also be referred to our oncology dietitian. The patient will not be referred.    Functional Assessment  Persons who are age 70 or greater will be screened for qualification of a comprehensive geriatric assessment by our survivorship nurse practitioner.  Older adults with cancer face unique challenges. These may include an increased risk of drug reactions, financial burdens, and caregiver stress. The patient scored   . Patients scoring 14 or lower will referred for an older adult functional assessment with the survivorship advanced practice registered nurse to ensure all needed support is provided as patients plan for their treatments. NOT APPLICABLE    Intravenous Access Assessment  The patient and I discussed planned intravenous chemo/biotherapy as well as other IV treatments that are often needed throughout the course of treatment. These may include, but are not limited to blood transfusions, antibiotics, and IV hydration. Discussed that depending on selected treatment and vein assessment, patient may require venous access device (VAD) which could include but not limited to a Mediport or PICC line. Risks and benefits of VADs reviewed. The patient will be treated via  "Port.    Reproductive/Sexual Activity   People should avoid becoming pregnant and should not get a partner pregnant while undergoing chemo/biotherapy.  People of childbearing age should use effective contraception during active therapy. The best recommendation for all people is to use a barrier method for a minimum of 1 week after the last infusion of chemo/biotherapy to prevent your partner being exposed to byproducts from treatment medications in bodily fluids. Effective contraception should be discussed with your oncology team to make sure it is safe to take based on your diagnosis. Possible options include oral contraceptives, barrier methods. Chemo/biotherapy can change your ability to reproduce children in the future.  There are options for fertility preservation. NOT APPLICABLE    Advanced Care Planning  Advance Care Planning   The patient and I discussed advanced care planning, \"Conversations that Matter\".   This service is offered for development of advance directives with a certified ACP facilitator.  The patient does not have an up-to-date advanced directive. This document is not on file with our office. The patient is not interested in an appointment with one of our facilitators to create or update their advanced directives.     Smoking cessation  Tobacco Use: Low Risk    • Smoking Tobacco Use: Never   • Smokeless Tobacco Use: Never   • Passive Exposure: Not on file       Patient and I discussed their tobacco use history. Referral will not be made for smoking cessation.      Palliative Care  When appropriate, the patient and I discussed the availability palliative care services and when appropriate Hospice care. Palliative care is not the same as Hospice care which was explained to the patient.  The patient is not interested in additional information from our  on these services.     Survivorship   When appropriate, we discussed that we will refer the patient to survivorship clinic to discuss " next steps following completion of planned treatment.  Reviewed this visit will include assessment of your physical, psychological, functional, and spiritual needs as a survivor and the need at attend this visit when scheduled.    TREATMENT EDUCATION    Today I met with the patient to discuss the chemo/biotherapy regimen recommended for treatment of Malignant neoplasm of upper-inner quadrant of left breast in female, estrogen receptor negative (HCC)  - lidocaine-prilocaine (EMLA) 2.5-2.5 % cream  - pantoprazole (PROTONIX) 40 MG EC tablet  - dexamethasone (DECADRON) 4 MG tablet  - ondansetron (ZOFRAN) 8 MG tablet  .  The patient was given explanation of treatment premed side effects including office policy that prohibits patients to drive if sedating medications are administered, MD explanation given regarding benefits, side effects, toxicities and goals of treatment.  The patient received a Chemotherapy/Biotherapy Plan Summary including diagnosis and explanation of specific treatment plan.    SIDE EFFECTS:  Common side effects were discussed with the patient and/or significant other.  Discussion included where applicable hair loss/discoloration, anemia/fatigue, infection/chills/fever, appetite, bleeding risk/precautions, constipation, diarrhea, mouth sores, taste alteration, loss of appetite, nausea/vomiting, peripheral neuropathy, skin/nail changes, rash, muscle aches/weakness, photosensitivity, weight gain/loss, hearing loss, dizziness, menopausal symptoms, menstrual irregularity, sterility, high blood pressure, heart damage, liver damage, lung damage, kidney damage, DVT/PE risk, fluid retention, pleural/pericardial effusion, somnolence, electrolyte/LFT imbalance, vein exercises and/or the possible need for vascular access/port placement.  The patient was advised that although uncommon, leakage of an infused medication from the vein or venous access device may lead to skin breakdown and/or other tissue damage.   The patient was advised that he/she may have pain, bleeding, and/or bruising from the insertion of a needle in their vein or venous access device (port).  The patient was further advised that, in spite of proper technique, infection with redness and irritation may rarely occur at the site where the needle was inserted.  The patient was advised that if complications occur, additional medical treatment is available.  Finally, where applicable we have reviewed rare but potential immune mediated side effects including shortness of breath, cough, chest pain (pneumonitis), abdominal pain, diarrhea (colitis), thyroiditis (hypothyroid or hyperthyroid), hepatitis and liver dysfunction, nephritis and renal dysfunction.    Discussion also included side effects specific to drugs in the treatment plan, specifically:    Treatment Plans     Name Type Plan Dates Plan Provider         Active    OP BREAST TC DOCEtaxel / Cyclophosphamide ONCOLOGY TREATMENT  11/13/2022 - Present Alba Bernardo MD                    Questions answered and additional information discussed on topics including:  Anemia, Thrombocytopenia, Neutropenia, Nutrition and appetite changes, Constipation, Diarrhea, Nausea & vomiting, Mouth sores, Alopecia, Skin & nail changes, [place] and Hand/Foot Cooling       Assessment and Plan:    Diagnoses and all orders for this visit:    1. Malignant neoplasm of upper-inner quadrant of left breast in female, estrogen receptor negative (HCC) (Primary)  -     lidocaine-prilocaine (EMLA) 2.5-2.5 % cream; Apply 1 application topically to the appropriate area as directed As Needed for Mild Pain.  Dispense: 30 g; Refill: 1  -     pantoprazole (PROTONIX) 40 MG EC tablet; Take 1 tablet by mouth Daily.  Dispense: 30 tablet; Refill: 3  -     dexamethasone (DECADRON) 4 MG tablet; Take 2 tablets oral twice a day for 3 consecutive days beginning the day before chemotherapy and continue for 6 doses.  Dispense: 48 tablet; Refill: 0  -      ondansetron (ZOFRAN) 8 MG tablet; Take 1 tablet by mouth 3 (Three) Times a Day As Needed for Nausea or Vomiting.  Dispense: 30 tablet; Refill: 5      No orders of the defined types were placed in this encounter.    1. The patient and I have reviewed their diagnosis and scheduled treatment plan. Needs assessment was completed where applicable including genetics, psychosocial needs, barriers to care, VAD evaluation, advanced care planning, survivorship, and palliative care services where indicated. Referrals have been ordered as appropriate based upon evaluation today and patient desires.   2. Chemo/biotherapy teaching was completed today and consent obtained. See separate documentation for further details.  3. Adequate time was given to answer questions.  Patient made aware of their care team members and contact information if they have questions or problems throughout the treatment course.  4. Discussion held and written information provided describing frequency of office visits and ongoing monitoring throughout the treatment plan.     5. Reviewed with patient any prescribed medication sent to pharmacy.  Education provided regarding proper storage, safe handling, and proper disposal of unused medication.  6. Proper handling of body fluids and waste discussed and written information provided.  7. If appropriate, patient had pretreatment labs drawn today.    Learning assessment completed at initial patient encounter. See separate flowsheet. Chemo/biotherapy education comprehension assessed at today's visit.    I spent 60 minutes caring for Bebe on this date of service. This time includes time spent by me in the following activities: preparing for the visit, reviewing tests, performing a medically appropriate examination and/or evaluation, counseling and educating the patient/family/caregiver, ordering medications, tests, or procedures, documenting information in the medical record and care coordination.     Manisha  Tessie Gallo, APRN   11/04/22

## 2022-11-04 NOTE — PROGRESS NOTES
[facility] Hematology/Oncology Treatment Plan Summary    Name: Bebe Kiser  Acct# [id number]  MD:     Diagnosis:     ICD-10-CM ICD-9-CM   1. Malignant neoplasm of upper-inner quadrant of left breast in female, estrogen receptor negative (HCC)  C50.212 174.2    Z17.1 V86.1     Stage: Ib      Goal of treatment: curative intent    Treatment Medication(s):   1. Taxotere  2. Cytoxan    Frequency: Every 21 days    Number of cycles: 4    Starting on: Monday 11/14/2022    Items for home use: Imodium AD (for diarrhea), Tylenol (for fever and/or pain) and Thermometer    Rx written for: [x] Nausea    [] Pre-Treatment   dexamethasone 4 mg by mouth twice daily on the day before, day of, and day after treatment, EMLA cream to port site 30 minutes prior to access and ondansetron 8 mg by mouth every 8 hours as needed for nausea    Notes:      Next Steps: PORT     Completing Provider: STACY Landry           Date/time: 11/04/2022      Please note: You will be seen by a provider frequently with your treatment plan. This plan may change depending on many factors, if so, this will be discussed with you by your physician.  Last update 03/2022.

## 2022-11-07 ENCOUNTER — NURSE NAVIGATOR (OUTPATIENT)
Dept: OTHER | Facility: HOSPITAL | Age: 57
End: 2022-11-07

## 2022-11-07 ENCOUNTER — CLINICAL SUPPORT (OUTPATIENT)
Dept: GENETICS | Facility: HOSPITAL | Age: 57
End: 2022-11-07

## 2022-11-07 DIAGNOSIS — C50.912 MALIGNANT NEOPLASM OF LEFT BREAST IN FEMALE, ESTROGEN RECEPTOR NEGATIVE, UNSPECIFIED SITE OF BREAST: ICD-10-CM

## 2022-11-07 DIAGNOSIS — Z17.1 MALIGNANT NEOPLASM OF LEFT BREAST IN FEMALE, ESTROGEN RECEPTOR NEGATIVE, UNSPECIFIED SITE OF BREAST: ICD-10-CM

## 2022-11-07 DIAGNOSIS — Z15.89 MONOALLELIC MUTATION OF PALB2 GENE: ICD-10-CM

## 2022-11-07 DIAGNOSIS — Z15.01 MONOALLELIC MUTATION OF PALB2 GENE: ICD-10-CM

## 2022-11-07 DIAGNOSIS — Z15.09 MONOALLELIC MUTATION OF PALB2 GENE: ICD-10-CM

## 2022-11-07 DIAGNOSIS — Z13.79 GENETIC TESTING: Primary | ICD-10-CM

## 2022-11-07 PROCEDURE — 96040: CPT | Performed by: GENETIC COUNSELOR, MS

## 2022-11-07 NOTE — PROGRESS NOTES
Called Ms. Kiser to see how she was doing. She stated she is healing well, but is struggling to look at herself in the mirror. She is struggling with deciding about reconstruction at this time.  We discussed this can be normal and discussed support options like [employer] and Friend for life. She was thankful for the information and will consider those options. She is seeing genetic counseling today at 2:30 and will meet with me afterward for knitted knockers and support information. She was thankful for the call.

## 2022-11-07 NOTE — PROGRESS NOTES
Bebe Kiser, a 57-year old female, was seen for genetic counseling due to a previous genetic testing. Ms. Kiser was diagnosed with a left sided breast cancer in August 2022.  She had a bilateral mastectomy in October and will start chemotherapy this month.  She still retains her uterus and ovaries. Her most recent colonoscopy was at age 48 and told to repeat that screening in ten years. She reports no history of polyps.  Following her breast cancer diagnosis, Dr. Manisha Holley ordered genetic testing through [facility] which evaluated 36 genes associated with hereditary breast cancer.  Genetic testing was positive for a pathogenic variant in PALB2([id number]). Ms. Kiser was interested in discussing the risks and recommendations related to this finding.     FAMILY HISTORY: (See attached pedigree)  Mat Uncle:  Lung cancer, 45  Mat Cousin:  Breast cancer, 38  Father:   Oral cancer  Pat Uncle:  Lung cancer  Pat Grandmother: Stomach/GI cancer    We were unable to confirm the diagnoses in these family members.     GENETIC COUNSELING (30 minutes): We reviewed the family history information in detail.  Cases of breast cancer follow three general patterns: sporadic, familial, and hereditary.  While most breast cancer is sporadic, some cases appear to occur in family clusters.  These cases are said to be familial and account for 10-20% of breast cancer cases.  Familial cases may be due to a combination of shared genes and environmental factors among family members.  In even fewer families, the cancer is said to be inherited, and the genes responsible for the cancer are known.      Family histories typical of hereditary cancer syndromes usually include multiple first- and second-degree relatives diagnosed with cancer types that define a syndrome.  These cases tend to be diagnosed at younger-than-expected ages and can be bilateral or multifocal.  The cancer in these families follows an autosomal dominant  inheritance pattern, which indicates the likely presence of a mutation in a cancer susceptibility gene.  Children and siblings of an individual believed to carry this mutation have a 50% chance of inheriting that mutation, thereby inheriting the increased risk to develop cancer.  These mutations can be passed down from the maternal or the paternal lineage.    Hereditary breast cancer accounts for 5-10% of all cases of breast cancer.  A significant proportion of hereditary breast cancer can be attributed to mutations in the BRCA1 and BRCA1 genes.  Mutations in these genes confer an increased risk for breast cancer, ovarian cancer, male breast cancer, prostate cancer and pancreatic cancer.      TEST RESULTS: Genetic testing identified a variant in the PALB2 gene ([id number]) . This is consistent with a diagnosis of hereditary breast cancer risk. Genetic testing for the PALB2 mutation should be offered to other family members to determine whether they are at an increased risk for breast cancer.      Based on Ms. Kiser’s test results, her siblings and children each have up to a 50% chance to have inherited the PALB2 mutation identified. We would be happy to see family members who live in the area in our clinic to further discuss this information and testing options. Relatives can call our office at [phone] for assistance in scheduling an appointment; however, a physician referral to our office will be needed to schedule an appointment.  For family members who live elsewhere, there are genetic counselors at most [facility]. They can find a genetic counselor by visiting the National Society of Genetic Counselors website at www.nsgc.org.  Relatives would need a copy of Ms. Kiser’s genetic test result to ensure they were being tested for the correct gene.    CANCER RISK:  Mutations in PALB2 have been found to increase the risk of female breast cancer resulting in a lifetime risk of approximately  41-60%.  NCCN guidelines quote the ovarian cancer risk associated with PALB2 to be 3-5%. As per the most recent NCCN recommendations (September 7, 2022), a RRSO (risk reducing salpingo-oophorectomy) can be considered over the age of 45 based on family history.   There have been reports of increased risks for pancreatic cancer and male breast cancer. The lifetime risk for pancreatic cancer is approximately 5-10%.    CANCER SCREENING:  Options available to individuals with a PALB2 mutation were discussed, including increased surveillance, chemoprevention and prophylactic surgery.     Increased breast cancer surveillance is warranted in women who have a PALB2 mutation.  Increased surveillance, based on NCCN guidelines, would consist of semi-annual clinical breast exam and monthly self-breast exam starting at age 18 and annual mammogram and breast MRI starting at age 30 or earlier based on family history. For women who have not had breast cancer and have a PALB2 mutation, chemoprevention is another option that is considered.  Studies have shown that Tamoxifen and Raloxifene can cut the risk of estrogen receptor positive breast cancer by 50% when taken by high-risk women. There are risks associated with these medications; therefore, the risks versus benefits must be considered prior to deciding to take chemopreventative medications. Per NCCN guidelines, bilateral risk reducing mastectomy for carriers of a PALB2 mutation may be considered based on family history and this reduces breast cancer risk by approximately 90%.  Ms. Kiser had a bilateral mastectomy in October of this year.       Screening for males with PALB2 mutations should include self-breast exam. Men and women with PALB2 mutations may wish to consider pancreatic cancer screening through a research study if there is a family history of pancreatic cancer. We discussed that there is not pancreatic cancer screening that has been proven to be effective at this  time. Although no standardized screening tests have been proven to be effective in early pancreatic cancer detection, there are some options considered for individuals with a significant family history of pancreatic cancer and an identified PALB2 mutation.  Some academic centers are offering screening with endoscopic ultrasound, high-resolution pancreatic protocol CT, or MRI, starting at age 50 or 10 years younger than the earliest diagnosis of pancreatic cancer in the family.  Since these screening methods are not standard of care by NCCN guidelines, consideration of referral to a clinical research screening program is appropriate. Of note, Ms. Kiser does not have a family history of pancreatic cancer but is uncertain if her paternal grandmother’s cancer was a stomach cancer or some other GI cancer.    PLAN:  Genetic counseling remains available for Ms. Kiser and her family. She did give permission for us to talk with her children and to use these results for family member testing. She plans to discuss her pancreatic cancer risk and recommended screening with her oncologist. Ms. Kiser was encouraged to contact us at [phone] with any questions or concerns she may have.       Paz Mcclain MS, [facility] [place], [facility]  Licensed Certified Genetic Counselor    Cc: Bebe Holley MD

## 2022-11-08 ENCOUNTER — TELEPHONE (OUTPATIENT)
Dept: ONCOLOGY | Facility: CLINIC | Age: 57
End: 2022-11-08

## 2022-11-08 NOTE — TELEPHONE ENCOUNTER
Oncology Social Work  Referral Follow Up    OSW received referral from STACY England, or financial concerns.   OSW called to follow up. OSW left a VM with pt introducing self, and offering additional support. OSW provided call back number, ([phone] or [phone], opt. [phone]) and encouraged pt to reach out at her convenience.    OSW will remain available to offer support.     Jeniffer [facility]O [facility]  Oncology Social Worker

## 2022-11-11 ENCOUNTER — TELEPHONE (OUTPATIENT)
Dept: ONCOLOGY | Facility: CLINIC | Age: 57
End: 2022-11-11

## 2022-11-11 NOTE — TELEPHONE ENCOUNTER
Returned call to Bebe, she states she received notice from [facility] and they cannot fill her caps for the [place].  She is due to start treatment on Monday.  Advised her that we have supply here if she does not receive her own on Monday and not to worry about that.  I spoke with the infusion manager and let her know there was a discrepancy, she will correct the form and send it in.

## 2022-11-11 NOTE — TELEPHONE ENCOUNTER
Caller: Matteo Kiser    Relationship: Self    Best call back number: [phone]    What is the best time to reach you: ASAP, AS INFUSIONS START Monday.    Who are you requesting to speak with (clinical staff, provider,  specific staff member): DR. DUARTE'S NURSE    Do you know the name of the person who called: MATTEO    What was the call regarding: YOU MAY HAVE ALREADY HANDLED THIS AS PATIENT GOT A CALL FROM THE OFFICE WHEN SHE WAS SPEAKING TO ME BUT WANT TO MAKE SURE IT WAS HANDLED.  THE INFUSIONS THAT SHE IS TO START Monday CANNOT BE SHIPPED OUT UNTIL THEY HEAR FROM OUR OFFICE AS THEY HAD HER B/DAY AS 12 INSTEAD OF 2.    Do you require a callback:  IF TAKEN CARE OF, ALL IS GOOD!

## 2022-11-14 ENCOUNTER — INFUSION (OUTPATIENT)
Dept: ONCOLOGY | Facility: HOSPITAL | Age: 57
End: 2022-11-14

## 2022-11-14 ENCOUNTER — APPOINTMENT (OUTPATIENT)
Dept: OCCUPATIONAL THERAPY | Facility: HOSPITAL | Age: 57
End: 2022-11-14

## 2022-11-14 VITALS
OXYGEN SATURATION: 97 % | RESPIRATION RATE: 16 BRPM | HEART RATE: 83 BPM | DIASTOLIC BLOOD PRESSURE: 65 MMHG | BODY MASS INDEX: 35.03 KG/M2 | SYSTOLIC BLOOD PRESSURE: 126 MMHG | TEMPERATURE: 97.1 F | WEIGHT: 204.2 LBS

## 2022-11-14 DIAGNOSIS — Z45.2 ENCOUNTER FOR FITTING AND ADJUSTMENT OF VASCULAR CATHETER: ICD-10-CM

## 2022-11-14 DIAGNOSIS — C50.212 MALIGNANT NEOPLASM OF UPPER-INNER QUADRANT OF LEFT BREAST IN FEMALE, ESTROGEN RECEPTOR NEGATIVE: Primary | ICD-10-CM

## 2022-11-14 DIAGNOSIS — Z17.1 MALIGNANT NEOPLASM OF UPPER-INNER QUADRANT OF LEFT BREAST IN FEMALE, ESTROGEN RECEPTOR NEGATIVE: Primary | ICD-10-CM

## 2022-11-14 LAB
ALBUMIN SERPL-MCNC: 4.5 G/DL (ref 3.5–5.2)
ALBUMIN/GLOB SERPL: 1.5 G/DL (ref 1.1–2.4)
ALP SERPL-CCNC: 55 U/L (ref 38–116)
ALT SERPL W P-5'-P-CCNC: 13 U/L (ref 0–33)
ANION GAP SERPL CALCULATED.3IONS-SCNC: 12.6 MMOL/L (ref 5–15)
AST SERPL-CCNC: 18 U/L (ref 0–32)
BASOPHILS # BLD AUTO: 0.02 10*3/MM3 (ref 0–0.2)
BASOPHILS NFR BLD AUTO: 0.1 % (ref 0–1.5)
BILIRUB SERPL-MCNC: 0.2 MG/DL (ref 0.2–1.2)
BUN SERPL-MCNC: 24 MG/DL (ref 6–20)
BUN/CREAT SERPL: 32.4 (ref 7.3–30)
CALCIUM SPEC-SCNC: 9.9 MG/DL (ref 8.5–10.2)
CHLORIDE SERPL-SCNC: 103 MMOL/L (ref 98–107)
CO2 SERPL-SCNC: 22.4 MMOL/L (ref 22–29)
CREAT SERPL-MCNC: 0.74 MG/DL (ref 0.6–1.1)
DEPRECATED RDW RBC AUTO: 44.6 FL (ref 37–54)
EGFRCR SERPLBLD CKD-EPI 2021: 93.3 ML/MIN/1.73
EOSINOPHIL # BLD AUTO: 0.01 10*3/MM3 (ref 0–0.4)
EOSINOPHIL NFR BLD AUTO: 0.1 % (ref 0.3–6.2)
ERYTHROCYTE [DISTWIDTH] IN BLOOD BY AUTOMATED COUNT: 13 % (ref 12.3–15.4)
GLOBULIN UR ELPH-MCNC: 3 GM/DL (ref 1.8–3.5)
GLUCOSE SERPL-MCNC: 112 MG/DL (ref 74–124)
HCT VFR BLD AUTO: 33.4 % (ref 34–46.6)
HGB BLD-MCNC: 10.9 G/DL (ref 12–15.9)
IMM GRANULOCYTES # BLD AUTO: 0.08 10*3/MM3 (ref 0–0.05)
IMM GRANULOCYTES NFR BLD AUTO: 0.6 % (ref 0–0.5)
LYMPHOCYTES # BLD AUTO: 1.83 10*3/MM3 (ref 0.7–3.1)
LYMPHOCYTES NFR BLD AUTO: 13.3 % (ref 19.6–45.3)
MCH RBC QN AUTO: 30.5 PG (ref 26.6–33)
MCHC RBC AUTO-ENTMCNC: 32.6 G/DL (ref 31.5–35.7)
MCV RBC AUTO: 93.6 FL (ref 79–97)
MONOCYTES # BLD AUTO: 0.86 10*3/MM3 (ref 0.1–0.9)
MONOCYTES NFR BLD AUTO: 6.3 % (ref 5–12)
NEUTROPHILS NFR BLD AUTO: 10.94 10*3/MM3 (ref 1.7–7)
NEUTROPHILS NFR BLD AUTO: 79.6 % (ref 42.7–76)
NRBC BLD AUTO-RTO: 0 /100 WBC (ref 0–0.2)
PLATELET # BLD AUTO: 268 10*3/MM3 (ref 140–450)
PMV BLD AUTO: 10.4 FL (ref 6–12)
POTASSIUM SERPL-SCNC: 4.7 MMOL/L (ref 3.5–4.7)
PROT SERPL-MCNC: 7.5 G/DL (ref 6.3–8)
RBC # BLD AUTO: 3.57 10*6/MM3 (ref 3.77–5.28)
SODIUM SERPL-SCNC: 138 MMOL/L (ref 134–145)
WBC NRBC COR # BLD: 13.74 10*3/MM3 (ref 3.4–10.8)

## 2022-11-14 PROCEDURE — 96375 TX/PRO/DX INJ NEW DRUG ADDON: CPT

## 2022-11-14 PROCEDURE — 25010000002 DOCETAXEL 20 MG/ML SOLUTION 8 ML VIAL: Performed by: INTERNAL MEDICINE

## 2022-11-14 PROCEDURE — 96417 CHEMO IV INFUS EACH ADDL SEQ: CPT

## 2022-11-14 PROCEDURE — 80053 COMPREHEN METABOLIC PANEL: CPT

## 2022-11-14 PROCEDURE — 96413 CHEMO IV INFUSION 1 HR: CPT

## 2022-11-14 PROCEDURE — 85025 COMPLETE CBC W/AUTO DIFF WBC: CPT

## 2022-11-14 PROCEDURE — 25010000002 PALONOSETRON PER 25 MCG: Performed by: INTERNAL MEDICINE

## 2022-11-14 PROCEDURE — 25010000002 CYCLOPHOSPHAMIDE 1 GM/5ML SOLUTION 5 ML VIAL: Performed by: INTERNAL MEDICINE

## 2022-11-14 PROCEDURE — 25010000002 HEPARIN LOCK FLUSH PER 10 UNITS: Performed by: INTERNAL MEDICINE

## 2022-11-14 PROCEDURE — 25010000002 DIPHENHYDRAMINE PER 50 MG: Performed by: INTERNAL MEDICINE

## 2022-11-14 RX ORDER — SODIUM CHLORIDE 9 MG/ML
250 INJECTION, SOLUTION INTRAVENOUS ONCE
Status: COMPLETED | OUTPATIENT
Start: 2022-11-14 | End: 2022-11-14

## 2022-11-14 RX ORDER — PALONOSETRON 0.05 MG/ML
0.25 INJECTION, SOLUTION INTRAVENOUS ONCE
Status: COMPLETED | OUTPATIENT
Start: 2022-11-14 | End: 2022-11-14

## 2022-11-14 RX ORDER — FAMOTIDINE 10 MG/ML
20 INJECTION, SOLUTION INTRAVENOUS AS NEEDED
Status: CANCELLED | OUTPATIENT
Start: 2022-11-14

## 2022-11-14 RX ORDER — PALONOSETRON 0.05 MG/ML
0.25 INJECTION, SOLUTION INTRAVENOUS ONCE
Status: CANCELLED | OUTPATIENT
Start: 2022-11-14

## 2022-11-14 RX ORDER — HEPARIN SODIUM (PORCINE) LOCK FLUSH IV SOLN 100 UNIT/ML 100 UNIT/ML
500 SOLUTION INTRAVENOUS AS NEEDED
Status: CANCELLED | OUTPATIENT
Start: 2022-11-14

## 2022-11-14 RX ORDER — SODIUM CHLORIDE 0.9 % (FLUSH) 0.9 %
10 SYRINGE (ML) INJECTION AS NEEDED
Status: DISCONTINUED | OUTPATIENT
Start: 2022-11-14 | End: 2022-11-14 | Stop reason: HOSPADM

## 2022-11-14 RX ORDER — FAMOTIDINE 10 MG/ML
20 INJECTION, SOLUTION INTRAVENOUS ONCE
Status: COMPLETED | OUTPATIENT
Start: 2022-11-14 | End: 2022-11-14

## 2022-11-14 RX ORDER — SODIUM CHLORIDE 9 MG/ML
250 INJECTION, SOLUTION INTRAVENOUS ONCE
Status: CANCELLED | OUTPATIENT
Start: 2022-11-14

## 2022-11-14 RX ORDER — DIPHENHYDRAMINE HYDROCHLORIDE 50 MG/ML
50 INJECTION INTRAMUSCULAR; INTRAVENOUS AS NEEDED
Status: CANCELLED | OUTPATIENT
Start: 2022-11-14

## 2022-11-14 RX ORDER — SODIUM CHLORIDE 0.9 % (FLUSH) 0.9 %
10 SYRINGE (ML) INJECTION AS NEEDED
Status: CANCELLED | OUTPATIENT
Start: 2022-11-14

## 2022-11-14 RX ORDER — HEPARIN SODIUM (PORCINE) LOCK FLUSH IV SOLN 100 UNIT/ML 100 UNIT/ML
500 SOLUTION INTRAVENOUS AS NEEDED
Status: DISCONTINUED | OUTPATIENT
Start: 2022-11-14 | End: 2022-11-14 | Stop reason: HOSPADM

## 2022-11-14 RX ADMIN — DOCETAXEL 150 MG: 20 INJECTION, SOLUTION, CONCENTRATE INTRAVENOUS at 12:36

## 2022-11-14 RX ADMIN — Medication 500 UNITS: at 15:08

## 2022-11-14 RX ADMIN — DIPHENHYDRAMINE HYDROCHLORIDE 50 MG: 50 INJECTION, SOLUTION INTRAMUSCULAR; INTRAVENOUS at 11:33

## 2022-11-14 RX ADMIN — CYCLOPHOSPHAMIDE 1200 MG: 200 INJECTION, SOLUTION INTRAVENOUS at 14:02

## 2022-11-14 RX ADMIN — SODIUM CHLORIDE 250 ML: 9 INJECTION, SOLUTION INTRAVENOUS at 11:05

## 2022-11-14 RX ADMIN — PALONOSETRON 0.25 MG: 0.05 INJECTION, SOLUTION INTRAVENOUS at 11:31

## 2022-11-14 RX ADMIN — FAMOTIDINE 20 MG: 10 INJECTION INTRAVENOUS at 11:35

## 2022-11-14 RX ADMIN — Medication 10 ML: at 15:08

## 2022-11-15 ENCOUNTER — INFUSION (OUTPATIENT)
Dept: ONCOLOGY | Facility: HOSPITAL | Age: 57
End: 2022-11-15

## 2022-11-15 DIAGNOSIS — Z17.1 MALIGNANT NEOPLASM OF UPPER-INNER QUADRANT OF LEFT BREAST IN FEMALE, ESTROGEN RECEPTOR NEGATIVE: Primary | ICD-10-CM

## 2022-11-15 DIAGNOSIS — C50.212 MALIGNANT NEOPLASM OF UPPER-INNER QUADRANT OF LEFT BREAST IN FEMALE, ESTROGEN RECEPTOR NEGATIVE: Primary | ICD-10-CM

## 2022-11-15 PROCEDURE — 25010000002 PEGFILGRASTIM-CBQV 6 MG/0.6ML SOLUTION PREFILLED SYRINGE: Performed by: INTERNAL MEDICINE

## 2022-11-15 PROCEDURE — 96372 THER/PROPH/DIAG INJ SC/IM: CPT

## 2022-11-15 RX ADMIN — PEGFILGRASTIM-CBQV 6 MG: 6 INJECTION, SOLUTION SUBCUTANEOUS at 13:03

## 2022-11-18 NOTE — PROGRESS NOTES
Subjective   Bebe Kiser is a 57 y.o. female.  Referred by Dr. Holley for left breast invasive ductal carcinoma, triple negative    History of Present Illness     Patient is a 57-year-old postmenopausal -American lady who presented with a screen detected abnormality of the left breast.    She denies any family history of breast cancer.  No previous abnormal mammograms or biopsies.  Comorbidities include hypertension.    8/4/2022-bilateral screening mammogram  Platelets are almost entirely fatty.  There is a new small, oval mass measuring 8 mm with indistinct margins, associated fine linear calcifications in the middle one third region of the left breast at 9:00.  No suspicious abnormalities of the right breast.    8/19/2022-left breast diagnostic mammogram  The breast is almost entirely fatty.  Additional evaluation of the left breast at 9:00 there is a small irregular mass measuring 6 mm with indistinct margins and associated fine linear calcifications in the middle one third region of the left breast, 10 cm from the nipple.    Left breast ultrasound  Ultrasound demonstrates an irregular heterogeneous mass with indistinct margins measuring 5 x 5 x 4 mm in the middle third, 9:30 position of the left breast, 10 cm from the nipple    Impression  Ultrasound-guided biopsy recommended.    8/25/2022-ultrasound-guided biopsy  Pathology consistent with invasive ductal carcinoma  Grade 3  Tumor size 0.7 cm  No lymphovascular space invasion  ER negative  PA negative  HER2 1+  Ki-67 58.07%    9/8/2022-genetic testing ordered.  Positive for PAL B2 pathogenic mutation.    She was evaluated by Dr. Holley on 9/8/2022 and referred for discussion of neoadjuvant versus adjuvant therapy.    9/27/2022-bilateral breast MRI-biopsy-proven malignancy in the left breast at 10 o'clock position measures 0.8 cm.  0.6 cm ill-defined focus of enhancement 1 cm anterior to the biopsy-proven malignancy.  Mammographically microcalcifications  are seen.  Correlation with left breast biopsy or surgical excision of the calcifications is recommended.  No suspicious findings in the right breast.    10/18/2022-bilateral mastectomy  1.right breast simple skin sparing mastectomy-benign breast parenchyma with no significant changes.  2.left axillary sentinel lymph node 1-benign  3.left axillary sentinel lymph node 2-benign  4.left breast simple skin sparing mastectomy  A.invasive ductal carcinoma, poorly differentiated, grade 3  Tumor measures 8 mm  Margins are negative for invasive carcinoma  No lymphovascular space invasion  B.clip and biopsy site changes  Seen on unremarkable skin and nipple    Receptors repeated and confirmed to be ER negative, ND negative, HER2 1+ on immunohistochemistry  Ki-67 65%    Patient presents to the clinic today accompanied by her daughter to discuss adjuvant therapy recommendations.  She is recovering well from surgery.  No tissue expanders placed and plans to perform reconstruction following completion of chemotherapy    Interval History:  The patient returns today for toxicity check 1 week after her first dose of TC. She is pleased with her tolerance so far.  She had a lot of energy and struggled with insomnia on Monday-Wednesday following chemotherapy.  Thursday she started to feel fatigued.  She also noticed increased appetite, and has been eating and drinking well.  She had a few episodes of diarrhea, controlled with Imodium as needed.  She denies fever or chills.  Denies nausea or vomiting.  Denies signs or symptoms of peripheral neuropathy.  Denies new or worsening pain.  No new concerns.      The following portions of the patient's history were reviewed and updated as appropriate: allergies, current medications, past family history, past medical history, past social history, past surgical history and problem list.    Past Medical History:   Diagnosis Date   • Breast cancer (HCC)     LEFT   • Elevated cholesterol    •  Hypertension         Past Surgical History:   Procedure Laterality Date   • CATARACT EXTRACTION Bilateral    • COLONOSCOPY      2013   • MASTECTOMY WITH SENTINEL NODE BIOPSY AND AXILLARY NODE DISSECTION Bilateral 10/18/2022    Procedure: bilateral skin-sparing mastectomy and left sentinel lymph node biopsy;  Surgeon: Manisha Holley MD;  Location: [facility];  Service: General;  Laterality: Bilateral;   • VENOUS ACCESS DEVICE (PORT) INSERTION Right 10/18/2022    Procedure: Port placement;  Surgeon: Manisha Holley MD;  Location: [facility];  Service: General;  Laterality: Right;        Family History   Problem Relation Age of Onset   • Hypotension Mother    • No Known Problems Father    • Breast cancer Neg Hx    • Ovarian cancer Neg Hx    • Uterine cancer Neg Hx    • Colon cancer Neg Hx    • Malig Hyperthermia Neg Hx         Social History     Socioeconomic History   • Marital status:    • Number of children: 3   Tobacco Use   • Smoking status: Never   • Smokeless tobacco: Never   Vaping Use   • Vaping Use: Never used   Substance and Sexual Activity   • Alcohol use: Not Currently     Comment: OCCASIONAL   • Drug use: No   • Sexual activity: Yes     Partners: Male     Birth control/protection: Post-menopausal        OB History        2    Para   2    Term   2            AB        Living   2       SAB        IAB        Ectopic        Molar        Multiple        Live Births   2             Age at menarche-10   3 para 2  1  Age at first live childbirth-20  Age at menopause-47  All contraceptive pill use-30 years  Hormone replacement therapy-none    No Known Allergies     Review of Systems   Constitutional: Negative.    HENT: Negative.    Eyes: Negative.    Respiratory: Negative.    Cardiovascular: Negative.    Gastrointestinal: Negative.    Endocrine: Negative.    Genitourinary: Negative.    Musculoskeletal: Negative.    Allergic/Immunologic: Negative.   "  Neurological: Negative.    Hematological: Negative.    Psychiatric/Behavioral: The patient is nervous/anxious.      Review of systems as mentioned in the HPI.    Objective   Blood pressure 116/72, pulse 91, temperature 97.1 °F (36.2 °C), temperature source Temporal, resp. rate 16, height 162.6 cm (64.02\"), weight 92.3 kg (203 lb 8 oz), SpO2 100 %, not currently breastfeeding.     Physical Exam  Vitals reviewed.   Constitutional:       Appearance: Normal appearance.   HENT:      Head: Normocephalic and atraumatic.      Nose: Nose normal.   Eyes:      Pupils: Pupils are equal, round, and reactive to light.   Cardiovascular:      Rate and Rhythm: Normal rate and regular rhythm.      Pulses: Normal pulses.      Heart sounds: Normal heart sounds.   Pulmonary:      Effort: Pulmonary effort is normal.   Abdominal:      General: Abdomen is flat.   Musculoskeletal:         General: Normal range of motion.      Cervical back: Normal range of motion.   Skin:     General: Skin is warm.   Neurological:      General: No focal deficit present.      Mental Status: She is alert and oriented to person, place, and time.   Psychiatric:         Mood and Affect: Mood normal.         Behavior: Behavior normal.         Thought Content: Thought content normal.         Judgment: Judgment normal.       Breast Exam: Status post bilateral mastectomy without reconstruction.  No expanders.  Incisions healing well    Infusion on 11/21/2022   Component Date Value Ref Range Status   • WBC 11/21/2022 25.96 (H)  3.40 - 10.80 10*3/mm3 Final   • RBC 11/21/2022 3.41 (L)  3.77 - 5.28 10*6/mm3 Final   • Hemoglobin 11/21/2022 10.5 (L)  12.0 - 15.9 g/dL Final   • Hematocrit 11/21/2022 31.7 (L)  34.0 - 46.6 % Final   • MCV 11/21/2022 93.0  79.0 - 97.0 fL Final   • MCH 11/21/2022 30.8  26.6 - 33.0 pg Final   • MCHC 11/21/2022 33.1  31.5 - 35.7 g/dL Final   • RDW 11/21/2022 12.6  12.3 - 15.4 % Final   • RDW-SD 11/21/2022 43.0  37.0 - 54.0 fl Final   • MPV " 11/21/2022 11.4  6.0 - 12.0 fL Final   • Platelets 11/21/2022 197  140 - 450 10*3/mm3 Final   • Neutrophil % 11/21/2022 56.2  42.7 - 76.0 % Final   • Lymphocyte % 11/21/2022 11.5 (L)  19.6 - 45.3 % Final   • Monocyte % 11/21/2022 13.8 (H)  5.0 - 12.0 % Final   • Eosinophil % 11/21/2022 0.2 (L)  0.3 - 6.2 % Final   • Basophil % 11/21/2022 0.1  0.0 - 1.5 % Final   • Immature Grans % 11/21/2022 18.2 (H)  0.0 - 0.5 % Final   • Neutrophils, Absolute 11/21/2022 14.58 (H)  1.70 - 7.00 10*3/mm3 Final   • Lymphocytes, Absolute 11/21/2022 2.99  0.70 - 3.10 10*3/mm3 Final   • Monocytes, Absolute 11/21/2022 3.58 (H)  0.10 - 0.90 10*3/mm3 Final   • Eosinophils, Absolute 11/21/2022 0.06  0.00 - 0.40 10*3/mm3 Final   • Basophils, Absolute 11/21/2022 0.03  0.00 - 0.20 10*3/mm3 Final   • Immature Grans, Absolute 11/21/2022 4.72 (H)  0.00 - 0.05 10*3/mm3 Final   • nRBC 11/21/2022 0.2  0.0 - 0.2 /100 WBC Final   Infusion on 11/14/2022   Component Date Value Ref Range Status   • Glucose 11/14/2022 112  74 - 124 mg/dL Final   • BUN 11/14/2022 24 (H)  6 - 20 mg/dL Final   • Creatinine 11/14/2022 0.74  0.60 - 1.10 mg/dL Final   • Sodium 11/14/2022 138  134 - 145 mmol/L Final   • Potassium 11/14/2022 4.7  3.5 - 4.7 mmol/L Final   • Chloride 11/14/2022 103  98 - 107 mmol/L Final   • CO2 11/14/2022 22.4  22.0 - 29.0 mmol/L Final   • Calcium 11/14/2022 9.9  8.5 - 10.2 mg/dL Final   • Total Protein 11/14/2022 7.5  6.3 - 8.0 g/dL Final   • Albumin 11/14/2022 4.50  3.50 - 5.20 g/dL Final   • ALT (SGPT) 11/14/2022 13  0 - 33 U/L Final   • AST (SGOT) 11/14/2022 18  0 - 32 U/L Final   • Alkaline Phosphatase 11/14/2022 55  38 - 116 U/L Final   • Total Bilirubin 11/14/2022 0.2  0.2 - 1.2 mg/dL Final   • Globulin 11/14/2022 3.0  1.8 - 3.5 gm/dL Final   • A/G Ratio 11/14/2022 1.5  1.1 - 2.4 g/dL Final   • BUN/Creatinine Ratio 11/14/2022 32.4 (H)  7.3 - 30.0 Final   • Anion Gap 11/14/2022 12.6  5.0 - 15.0 mmol/L Final   • eGFR 11/14/2022 93.3  >60.0  mL/min/1.73 Final    National Kidney Foundation and American Society of Nephrology (ASN) Task Force recommended calculation based on the Chronic Kidney Disease Epidemiology Collaboration (CKD-EPI) equation refit without adjustment for race.   • WBC 11/14/2022 13.74 (H)  3.40 - 10.80 10*3/mm3 Final   • RBC 11/14/2022 3.57 (L)  3.77 - 5.28 10*6/mm3 Final   • Hemoglobin 11/14/2022 10.9 (L)  12.0 - 15.9 g/dL Final   • Hematocrit 11/14/2022 33.4 (L)  34.0 - 46.6 % Final   • MCV 11/14/2022 93.6  79.0 - 97.0 fL Final   • MCH 11/14/2022 30.5  26.6 - 33.0 pg Final   • MCHC 11/14/2022 32.6  31.5 - 35.7 g/dL Final   • RDW 11/14/2022 13.0  12.3 - 15.4 % Final   • RDW-SD 11/14/2022 44.6  37.0 - 54.0 fl Final   • MPV 11/14/2022 10.4  6.0 - 12.0 fL Final   • Platelets 11/14/2022 268  140 - 450 10*3/mm3 Final   • Neutrophil % 11/14/2022 79.6 (H)  42.7 - 76.0 % Final   • Lymphocyte % 11/14/2022 13.3 (L)  19.6 - 45.3 % Final   • Monocyte % 11/14/2022 6.3  5.0 - 12.0 % Final   • Eosinophil % 11/14/2022 0.1 (L)  0.3 - 6.2 % Final   • Basophil % 11/14/2022 0.1  0.0 - 1.5 % Final   • Immature Grans % 11/14/2022 0.6 (H)  0.0 - 0.5 % Final   • Neutrophils, Absolute 11/14/2022 10.94 (H)  1.70 - 7.00 10*3/mm3 Final   • Lymphocytes, Absolute 11/14/2022 1.83  0.70 - 3.10 10*3/mm3 Final   • Monocytes, Absolute 11/14/2022 0.86  0.10 - 0.90 10*3/mm3 Final   • Eosinophils, Absolute 11/14/2022 0.01  0.00 - 0.40 10*3/mm3 Final   • Basophils, Absolute 11/14/2022 0.02  0.00 - 0.20 10*3/mm3 Final   • Immature Grans, Absolute 11/14/2022 0.08 (H)  0.00 - 0.05 10*3/mm3 Final   • nRBC 11/14/2022 0.0  0.0 - 0.2 /100 WBC Final        No radiology results for the last 30 days.   Assessment & Plan       *Left breast invasive ductal carcinoma  · Grade 3, ER/MT negative, HER2 1+ on immunohistochemistry, Ki-67 58%  · Tumor measures 8 mm on mammogram and 5 mm on ultrasound  · On the biopsy the tumor measures 0.7 cm.  · MRI of the breast confirmed the tumor to be  0.8 mm.  · Status post bilateral mastectomy on 10/18/2022.  · Pathology consistent with invasive ductal carcinoma of the left breast measuring 8 mm, triple negative, grade 3, Ki-67 65%  · pT1b N0 M0, stage Ia clinical, stage Ib prognostic  · We discussed the benefit of chemotherapy and tumors which are over 5 mm and triple negative.  · Given that the tumor is under 1 cm we will proceed with Taxotere and cyclophosphamide.  · Adverse effects of chemotherapy including but not limited to myelosuppression, increased risk of infections, nausea, vomiting, hypersensitivity reactions, risk of extravasation, neurotoxicity, arthralgias and myalgias, diarrhea, constipation, mucositis discussed.  · Cycle 1 TC 11/14/2022.  · 11/21/2022: Toxicity check 1 week after first cycle TC.  The patient has been tolerating treatment well so far.  Had increased appetite and energy Monday-Wednesday after treatment, likely secondary to dexamethasone.  Has been eating and drinking well.  500 cc normal saline in office today.    *Pathogenic PAL B2 mutation  · We discussed the autosomal dominant inheritance of the PAL B2 mutation.  · Her daughter who is accompanying her to the visit is scheduled to undergo testing today.  · We also discussed the penetrance and the screening recommendations.  · She thinks that her paternal grandmother might have had pancreatic cancer but unsure.  · We discussed the increased risk of pancreatic cancer associated PAL B2 mutation.  Discussed screening recommendations of annual MRCP or EUS for pancreatic cancer starting at the age of 50.  · Given that there is no clear first-degree relative with pancreatic cancer she would not meet guidelines recommendation for screening for pancreatic cancer.  · We discussed the signs and symptoms of pancreatic cancer including nausea, vomiting, epigastric pain and discomfort, abdominal distention, weight loss  · She does not have a family history of ovarian cancer hence there is no  clear indication for risk reducing salpingo-oophorectomy.  Recommend that she discuss this further with her GYN.    *Hypertension-blood pressure 116/72.    *Obesity-BMI 36    *Insomnia  · Monday through Wednesday after initiating dexamethasone.  Will consider reducing dexamethasone dosing with next cycle.  No nausea and appetite has been good with dexamethasone.    PLAN:   · 500 cc normal saline in clinic today.  · Consider reducing dexamethasone with next cycle due to insomnia.  · Return in 2 weeks for MD follow-up and cycle 2 TC.    The patient is on high risk medication that requires close monitoring for toxicity.    Nataliia Smith, APRN  11/21/22

## 2022-11-21 ENCOUNTER — INFUSION (OUTPATIENT)
Dept: ONCOLOGY | Facility: HOSPITAL | Age: 57
End: 2022-11-21

## 2022-11-21 ENCOUNTER — OFFICE VISIT (OUTPATIENT)
Dept: ONCOLOGY | Facility: CLINIC | Age: 57
End: 2022-11-21

## 2022-11-21 VITALS
OXYGEN SATURATION: 100 % | HEART RATE: 91 BPM | WEIGHT: 203.5 LBS | DIASTOLIC BLOOD PRESSURE: 72 MMHG | HEIGHT: 64 IN | SYSTOLIC BLOOD PRESSURE: 116 MMHG | RESPIRATION RATE: 16 BRPM | TEMPERATURE: 97.1 F | BODY MASS INDEX: 34.74 KG/M2

## 2022-11-21 DIAGNOSIS — Z79.899 HIGH RISK MEDICATION USE: ICD-10-CM

## 2022-11-21 DIAGNOSIS — Z45.2 ENCOUNTER FOR FITTING AND ADJUSTMENT OF VASCULAR CATHETER: ICD-10-CM

## 2022-11-21 DIAGNOSIS — Z17.1 MALIGNANT NEOPLASM OF UPPER-INNER QUADRANT OF LEFT BREAST IN FEMALE, ESTROGEN RECEPTOR NEGATIVE: Primary | ICD-10-CM

## 2022-11-21 DIAGNOSIS — C50.212 MALIGNANT NEOPLASM OF UPPER-INNER QUADRANT OF LEFT BREAST IN FEMALE, ESTROGEN RECEPTOR NEGATIVE: Primary | ICD-10-CM

## 2022-11-21 DIAGNOSIS — F19.982 DRUG-INDUCED INSOMNIA: ICD-10-CM

## 2022-11-21 DIAGNOSIS — Z17.1 MALIGNANT NEOPLASM OF UPPER-INNER QUADRANT OF LEFT BREAST IN FEMALE, ESTROGEN RECEPTOR NEGATIVE: ICD-10-CM

## 2022-11-21 DIAGNOSIS — C50.212 MALIGNANT NEOPLASM OF UPPER-INNER QUADRANT OF LEFT BREAST IN FEMALE, ESTROGEN RECEPTOR NEGATIVE: ICD-10-CM

## 2022-11-21 LAB
ALBUMIN SERPL-MCNC: 4 G/DL (ref 3.5–5.2)
ALBUMIN/GLOB SERPL: 1.4 G/DL (ref 1.1–2.4)
ALP SERPL-CCNC: 64 U/L (ref 38–116)
ALT SERPL W P-5'-P-CCNC: 11 U/L (ref 0–33)
ANION GAP SERPL CALCULATED.3IONS-SCNC: 11.5 MMOL/L (ref 5–15)
AST SERPL-CCNC: 24 U/L (ref 0–32)
BASOPHILS # BLD AUTO: 0.03 10*3/MM3 (ref 0–0.2)
BASOPHILS NFR BLD AUTO: 0.1 % (ref 0–1.5)
BILIRUB SERPL-MCNC: 0.3 MG/DL (ref 0.2–1.2)
BUN SERPL-MCNC: 12 MG/DL (ref 6–20)
BUN/CREAT SERPL: 15.8 (ref 7.3–30)
CALCIUM SPEC-SCNC: 9.2 MG/DL (ref 8.5–10.2)
CHLORIDE SERPL-SCNC: 100 MMOL/L (ref 98–107)
CO2 SERPL-SCNC: 23.5 MMOL/L (ref 22–29)
CREAT SERPL-MCNC: 0.76 MG/DL (ref 0.6–1.1)
DEPRECATED RDW RBC AUTO: 43 FL (ref 37–54)
EGFRCR SERPLBLD CKD-EPI 2021: 90.4 ML/MIN/1.73
EOSINOPHIL # BLD AUTO: 0.06 10*3/MM3 (ref 0–0.4)
EOSINOPHIL NFR BLD AUTO: 0.2 % (ref 0.3–6.2)
ERYTHROCYTE [DISTWIDTH] IN BLOOD BY AUTOMATED COUNT: 12.6 % (ref 12.3–15.4)
GLOBULIN UR ELPH-MCNC: 2.9 GM/DL (ref 1.8–3.5)
GLUCOSE SERPL-MCNC: 93 MG/DL (ref 74–124)
HCT VFR BLD AUTO: 31.7 % (ref 34–46.6)
HGB BLD-MCNC: 10.5 G/DL (ref 12–15.9)
IMM GRANULOCYTES # BLD AUTO: 4.72 10*3/MM3 (ref 0–0.05)
IMM GRANULOCYTES NFR BLD AUTO: 18.2 % (ref 0–0.5)
LYMPHOCYTES # BLD AUTO: 2.99 10*3/MM3 (ref 0.7–3.1)
LYMPHOCYTES NFR BLD AUTO: 11.5 % (ref 19.6–45.3)
MCH RBC QN AUTO: 30.8 PG (ref 26.6–33)
MCHC RBC AUTO-ENTMCNC: 33.1 G/DL (ref 31.5–35.7)
MCV RBC AUTO: 93 FL (ref 79–97)
MONOCYTES # BLD AUTO: 3.58 10*3/MM3 (ref 0.1–0.9)
MONOCYTES NFR BLD AUTO: 13.8 % (ref 5–12)
NEUTROPHILS NFR BLD AUTO: 14.58 10*3/MM3 (ref 1.7–7)
NEUTROPHILS NFR BLD AUTO: 56.2 % (ref 42.7–76)
NRBC BLD AUTO-RTO: 0.2 /100 WBC (ref 0–0.2)
PLATELET # BLD AUTO: 197 10*3/MM3 (ref 140–450)
PMV BLD AUTO: 11.4 FL (ref 6–12)
POTASSIUM SERPL-SCNC: 3.9 MMOL/L (ref 3.5–4.7)
PROT SERPL-MCNC: 6.9 G/DL (ref 6.3–8)
RBC # BLD AUTO: 3.41 10*6/MM3 (ref 3.77–5.28)
SODIUM SERPL-SCNC: 135 MMOL/L (ref 134–145)
WBC NRBC COR # BLD: 25.96 10*3/MM3 (ref 3.4–10.8)

## 2022-11-21 PROCEDURE — 99214 OFFICE O/P EST MOD 30 MIN: CPT | Performed by: NURSE PRACTITIONER

## 2022-11-21 PROCEDURE — 96360 HYDRATION IV INFUSION INIT: CPT

## 2022-11-21 PROCEDURE — 85025 COMPLETE CBC W/AUTO DIFF WBC: CPT

## 2022-11-21 PROCEDURE — 25010000002 HEPARIN LOCK FLUSH PER 10 UNITS: Performed by: INTERNAL MEDICINE

## 2022-11-21 PROCEDURE — 80053 COMPREHEN METABOLIC PANEL: CPT

## 2022-11-21 RX ORDER — HEPARIN SODIUM (PORCINE) LOCK FLUSH IV SOLN 100 UNIT/ML 100 UNIT/ML
500 SOLUTION INTRAVENOUS AS NEEDED
Status: DISCONTINUED | OUTPATIENT
Start: 2022-11-21 | End: 2022-11-21 | Stop reason: HOSPADM

## 2022-11-21 RX ORDER — HEPARIN SODIUM (PORCINE) LOCK FLUSH IV SOLN 100 UNIT/ML 100 UNIT/ML
500 SOLUTION INTRAVENOUS AS NEEDED
Status: CANCELLED | OUTPATIENT
Start: 2022-11-21

## 2022-11-21 RX ORDER — SODIUM CHLORIDE 9 MG/ML
1000 INJECTION, SOLUTION INTRAVENOUS ONCE
Status: COMPLETED | OUTPATIENT
Start: 2022-11-21 | End: 2022-11-21

## 2022-11-21 RX ORDER — SODIUM CHLORIDE 0.9 % (FLUSH) 0.9 %
10 SYRINGE (ML) INJECTION AS NEEDED
Status: DISCONTINUED | OUTPATIENT
Start: 2022-11-21 | End: 2022-11-21 | Stop reason: HOSPADM

## 2022-11-21 RX ORDER — SODIUM CHLORIDE 0.9 % (FLUSH) 0.9 %
10 SYRINGE (ML) INJECTION AS NEEDED
Status: CANCELLED | OUTPATIENT
Start: 2022-11-21

## 2022-11-21 RX ADMIN — SODIUM CHLORIDE 1000 ML: 9 INJECTION, SOLUTION INTRAVENOUS at 14:31

## 2022-11-21 RX ADMIN — Medication 10 ML: at 15:07

## 2022-11-21 RX ADMIN — Medication 500 UNITS: at 15:07

## 2022-11-21 NOTE — NURSING NOTE
Per Nataliia HOOKS, will start 500 NS while waiting on results of CMP. If CMP looks okay and pt is taking po well w/o N/V pt can leave after completion of 500 cc NS. CMP is WNL, pt deaccessed and ambulatory upon D/C.    Lab Results   Component Value Date    WBC 25.96 (H) 11/21/2022    HGB 10.5 (L) 11/21/2022    HCT 31.7 (L) 11/21/2022    MCV 93.0 11/21/2022     11/21/2022     Lab Results   Component Value Date    GLUCOSE 93 11/21/2022    BUN 12 11/21/2022    CREATININE 0.76 11/21/2022    EGFRIFNONA 70 04/09/2021    EGFRIFAFRI 84 04/09/2021    BCR 15.8 11/21/2022    K 3.9 11/21/2022    CO2 23.5 11/21/2022    CALCIUM 9.2 11/21/2022    PROTENTOTREF 7.1 04/01/2022    ALBUMIN 4.00 11/21/2022    LABIL2 1.7 04/01/2022    AST 24 11/21/2022    ALT 11 11/21/2022

## 2022-11-28 ENCOUNTER — HOSPITAL ENCOUNTER (OUTPATIENT)
Dept: OCCUPATIONAL THERAPY | Facility: HOSPITAL | Age: 57
Setting detail: THERAPIES SERIES
Discharge: HOME | End: 2022-11-28

## 2022-11-28 DIAGNOSIS — C50.212 MALIGNANT NEOPLASM OF UPPER-INNER QUADRANT OF LEFT BREAST IN FEMALE, ESTROGEN RECEPTOR NEGATIVE: ICD-10-CM

## 2022-11-28 DIAGNOSIS — Z17.1 MALIGNANT NEOPLASM OF UPPER-INNER QUADRANT OF LEFT BREAST IN FEMALE, ESTROGEN RECEPTOR NEGATIVE: ICD-10-CM

## 2022-11-28 DIAGNOSIS — Z91.89 AT RISK FOR LYMPHEDEMA: Primary | ICD-10-CM

## 2022-11-28 PROCEDURE — 93702 BIS XTRACELL FLUID ANALYSIS: CPT

## 2022-11-28 PROCEDURE — 97166 OT EVAL MOD COMPLEX 45 MIN: CPT

## 2022-11-28 PROCEDURE — 97535 SELF CARE MNGMENT TRAINING: CPT

## 2022-11-28 NOTE — THERAPY EVALUATION
Outpatient Occupational Therapy Lymphedema Initial Evaluation  [facility]     Patient Name: Bebe Kiser  : 1965  MRN: [id number]  Today's Date: 2022      Visit Date: 2022    Patient Active Problem List   Diagnosis   • Hypertension   • Hypertrophic polyarthritis   • Neuralgia neuritis, sciatic nerve   • Vitamin D deficiency   • High risk medication use   • Malignant neoplasm of upper-inner quadrant of left breast in female, estrogen receptor negative (HCC)   • Monoallelic mutation of PALB2 gene   • Encounter for fitting and adjustment of vascular catheter        Past Medical History:   Diagnosis Date   • Breast cancer (HCC)     LEFT   • Elevated cholesterol    • Hypertension         Past Surgical History:   Procedure Laterality Date   • CATARACT EXTRACTION Bilateral    • COLONOSCOPY         • MASTECTOMY WITH SENTINEL NODE BIOPSY AND AXILLARY NODE DISSECTION Bilateral 10/18/2022    Procedure: bilateral skin-sparing mastectomy and left sentinel lymph node biopsy;  Surgeon: Manisha Holley MD;  Location: [facility];  Service: General;  Laterality: Bilateral;   • VENOUS ACCESS DEVICE (PORT) INSERTION Right 10/18/2022    Procedure: Port placement;  Surgeon: Manisha Holley MD;  Location: [facility];  Service: General;  Laterality: Right;         Visit Dx:     ICD-10-CM ICD-9-CM   1. At risk for lymphedema  Z91.89 V49.89   2. 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  -RE      Patient/Caregiver Goals Return to prior level of function;Know what to do to help the symptoms  -RE       Hand Dominance right-handed  -RE      Are you or can you be pregnant No  -RE         Pain     Pain Location Other (Comment)  axilla  -RE      Pain Comments tightness  -RE         Fall Risk Assessment    Any falls in the past year: No  -RE         Services    Are you currently receiving Home Health services No  -RE         Daily Activities    Primary Language English  -RE      Are you able to read Yes  -RE      Are you able to write Yes  -RE      How does patient learn best? Reading  -RE      Teaching needs identified Home Exercise Program;Management of Condition  -RE      Patient is concerned about/has problems with Reaching over head  -RE      Does patient have problems with the following? None  -RE      Barriers to learning None  -RE      Pt Participated in POC and Goals Yes  -RE         Safety    Are you being hurt, hit, or frightened by anyone at home or in your life? No  -RE      Are you being neglected by a caregiver No  -RE            User Key  (r) = Recorded By, (t) = Taken By, (c) = Cosigned By    Initials Name Provider Type    RE Sharmin Cruz OTR Occupational Therapist                 Lymphedema     Row Name 11/28/22 1000             Subjective Pain    Able to rate subjective pain? yes  -RE      Pre-Treatment Pain Level 0  -RE      Post-Treatment Pain Level 0  -RE         Subjective Comments    Subjective Comments c/o some tightness in theL axilla  -RE         Lymphedema Assessment    Lymphedema Classification LUE:;at risk/stage 0  -RE      Lymphedema Cancer Related Sx bilateral;simple mastectomy;left;sentinel node biopsy  skin sparing with possible delayed reconstruction  -RE      Lymph Nodes Removed # 3  -RE      Positive Lymph Nodes # 0  -RE      Chemo Received yes  -RE      Radiation Therapy Received no  -RE      Infections or Cellulitis? no  -RE         Posture/Observations    Posture/Observations Comments Mild L shoulder internal rotation  -RE         General ROM    GENERAL ROM COMMENTS B UE AROM  is WNL  -RE         MMT (Manual Muscle Testing)    General MMT Comments L UE strength is grossly 4+/5 proximally  -RE         Skin Changes/Observations    Skin Observations Comment no cording  -RE         L-Dex Bioimpedence Screening    L-Dex Measurement Extremity LUE  -RE      L-Dex Patient Position Standing  -RE      L-Dex UE Dominate Side Right  -RE      L-Dex UE At Risk Side Left  -RE      L-Dex UE Pre Surgical Value No  -RE      L-Dex UE Score -1.6  -RE      L-Dex UE Baseline Score -1.6  -RE      L-Dex UE Value Change 0  -RE      L-Dex UE Comment WNL  -RE            User Key  (r) = Recorded By, (t) = Taken By, (c) = Cosigned By    Initials Name Provider Type    Sharmin Nava OTR Occupational Therapist                 The patient had a baseline SOZO measurement which I reviewed today. The score is WNL, see in EMR. Bioimpedance spectroscopy helps identify the onset of lymphedema in an arm or leg before patients experience noticeable swelling. Research has shown that the early detection of lymphedema using L-Dex combined with treatment can reduce progression to chronic lymphedema by 95% in breast cancer patients. Whenever possible, patients are tested for baseline L-Dex score before cancer treatment begins and then are reassessed during regular follow-up visits using the SOZO device. Otherwise, this can be started postoperatively and continued during regular follow-up visits. If the patient's L-Dex score increases above normal levels, that is a sign that lymphedema is developing and a referral is made to occupational therapy for further evaluation and early compression treatment. Lymphedema assessment with the SOZO L-Dex score is recommended to be done every 3 months for the first 3 years and then every 6 months for years 4 and 5 followed by annually afterwards.        Therapy Education  Education Details: Discussed lymphedema precautions and gave written information.  Recommended compression during exercise  and heavy UE activity and air travel.  Explained purpose of Bioimpedance testing including recommended frequency. Discussed patient's L-dex value. Instructed in scar massage.   Access Code: [email or web address]  URL: [email or web address]  Date: 11/28/2022  Prepared by: Sharmin Cruz    Exercises  Shoulder External Rotation and Scapular Retraction - 1 x daily - 7 x weekly - 3 sets - 10 reps  Shoulder Scaption Wall Slide with Towel - 1 x daily - 7 x weekly - 3 sets - 10 reps  Shoulder Flexion Wall Slide with Towel - 1 x daily - 7 x weekly - 3 sets - 10 reps  Given: HEP, Symptoms/condition management, Other (comment)  Program: New  How Provided: Verbal, Written, Demonstration  Provided to: Patient  Level of Understanding: Teach back education performed, Verbalized  40276 - OT Self Care/Mgmt Minutes: 15         OT Goals     Row Name 11/28/22 1700          OT Short Term Goals    STG Date to Achieve 12/12/22  -RE     STG 1 Patient will demonstrate proper awareness of “What is Lymphedema?” and “Healthy Habits” for improved prevention, management, care of symptoms and ease of transition to self-care of condition.  -RE     STG 1 Progress New  -RE     STG 2 Patient independent and compliant with initial home exercise program focused on gentle AROM and stretching to improve AROM to pre-surgical level.  -RE     STG 2 Progress New  -RE        Long Term Goals    LTG Date to Achieve 02/28/23  -RE     LTG 1 Pt will demonstrate understanding of use of compression sleeve for edema prevention, exercise and air travel.  -RE     LTG 1 Progress New  -RE     LTG 2 Patient will participate in bioimpedance scans every 3 months as a method of early detection of lymphedema to allow for early intervention.  -RE     LTG 2 Progress New  -RE     LTG 3 Patient's bioimpedance score to remain below 4.9 for decreased risk of stage II lymphedema.  -RE     LTG 3 Progress New  -RE     LTG 4 pt to report decreased discomfort in herL axilla per her  perception  -RE     LTG 4 Progress New  -RE        Time Calculation    OT Goal Re-Cert Due Date 02/28/23  -RE           User Key  (r) = Recorded By, (t) = Taken By, (c) = Cosigned By    Initials Name Provider Type    Sharmin Nava OTR Occupational Therapist                 OT Assessment/Plan     Row Name 11/28/22 1746          OT Assessment    Impairments Impaired lymphatic circulation  -RE     Assessment Comments Bebe Kiser is a 57 y.o. female recently diagnosed with Left Breast Cancer, presents to therapy in evolving condition, s/p Bilateral Simple Mastectomywith left Accord Node Biopsy.  Bebe Kiser is at increased risk of post Mastectomy lymphedema syndrome Left Upper Extremity due to Breast surgery Lymph Node Removal BMI . She presents with post-operative decreased flexibility and discomfort in the L axilla. Currently, negative s/s of lymphedema or axillary cording. We did complete a baseline post operative bioimpedance assessment, with current L-Dex score of-1.6 , which is WNL. Bebe Kiser will benefit from skilled formal Breast Care Occupational Therapy at this time for lymphedema surveillance and bioimpedance.  -RE     Please refer to paper survey for additional self-reported information Yes  -RE     OT Diagnosis at risk for lymphedema  -RE     OT Rehab Potential Good  -RE     Patient/caregiver participated in establishment of treatment plan and goals Yes  -RE     Patient would benefit from skilled therapy intervention Yes  -RE        OT Plan    OT Frequency Other (comment)  -RE     Predicted Duration of Therapy Intervention (OT) bioimpedance every 3 months  -RE     Planned CPT's? OT EVAL MOD COMPLEXITY: 91482;OT SELF CARE/MGMT/TRAIN 15 MIN: 88208;OT BIS XTRACELL FLUID ANALYSIS: 47685;OT THER PROC EA 15 MIN: [id number]  -RE     Planned Therapy Interventions (Optional Details) home exercise program;patient/family education;other (see comments)  bioimpedance  -RE     OT Plan Comments follow up in3  months  -RE           User Key  (r) = Recorded By, (t) = Taken By, (c) = Cosigned By    Initials Name Provider Type    RE Sharmin Cruz OTR Occupational Therapist                Outcome Measure Options: Quick DASH  Quick DASH  Open a tight or new jar.: No Difficulty  Do heavy household chores (e.g., wash walls, wash floors): No Difficulty  Carry a shopping bag or briefcase: No Difficulty  Wash your back: No Difficulty  Use a knife to cut food: No Difficulty  Recreational activities in which you take some force or impact through your arm, should or hand (e.g. golf, hammering, tennis, etc.): No Difficulty  During the past week, to what extent has your arm, shoulder, or hand problem interfered with your normal social activites with family, friends, neighbors or groups?: Not at all  During the past week, were you limited in your work or other regular daily activities as a result of your arm, shoulder or hand problem?: Not limited at all  Arm, Shoulder, or hand pain: None  Tingling (pins and needles) in your arm, shoulder, or hand: Mild  During the past week, how much difficulty have you had sleeping because of the pain in your arm, shoulder or hand?: No difficulty  Number of Questions Answered: 11  Quick DASH Score: 2.27         Time Calculation:   OT Start Time: 0945  OT Stop Time: 1045  OT Time Calculation (min): 60 min  Total Timed Code Minutes- OT: 15 minute(s)  Timed Charges  68798 - OT Self Care/Mgmt Minutes: 15  Untimed Charges  OT Eval/Re-eval Minutes: 35  94338 - OT Bioimpedence Rx Minutes: 10  Total Minutes  Timed Charges Total Minutes: 15  Untimed Charges Total Minutes: 45   Total Minutes: 60     Therapy Charges for Today     Code Description Service Date Service Provider Modifiers Qty    22247851488 HC OT SELF CARE/MGMT/TRAIN EA 15 MIN 11/28/2022 Sharmin Cruz OTR GO 1    59192658128 HC OT BIS XTRACELL FLUID ANALYSIS 11/28/2022 Sharmin Cruz OTR GO 1    14868147557 HC OT EVAL MOD COMPLEXITY 2 11/28/2022  Sharmin Cruz, OTR GO 1                    Sharmin Cruz, OTNUBIA  11/28/2022

## 2022-12-02 ENCOUNTER — OFFICE VISIT (OUTPATIENT)
Dept: FAMILY MEDICINE CLINIC | Facility: CLINIC | Age: 57
End: 2022-12-02

## 2022-12-02 ENCOUNTER — PATIENT ROUNDING (BHMG ONLY) (OUTPATIENT)
Dept: FAMILY MEDICINE CLINIC | Facility: CLINIC | Age: 57
End: 2022-12-02

## 2022-12-02 VITALS
SYSTOLIC BLOOD PRESSURE: 90 MMHG | WEIGHT: 205.8 LBS | RESPIRATION RATE: 20 BRPM | BODY MASS INDEX: 35.13 KG/M2 | TEMPERATURE: 96.6 F | HEART RATE: 71 BPM | DIASTOLIC BLOOD PRESSURE: 70 MMHG | HEIGHT: 64 IN | OXYGEN SATURATION: 99 %

## 2022-12-02 DIAGNOSIS — I10 PRIMARY HYPERTENSION: Primary | ICD-10-CM

## 2022-12-02 PROCEDURE — 99213 OFFICE O/P EST LOW 20 MIN: CPT | Performed by: NURSE PRACTITIONER

## 2022-12-02 NOTE — PROGRESS NOTES
A [employer] message has been sent to patient rounding with [facility].

## 2022-12-02 NOTE — PROGRESS NOTES
"Chief Complaint  Establish Care and Hypertension (No issues)     Subjective          Bebe Kiser presents to [facility]  Hypertension  This is a chronic problem. Progression since onset: Stable. The problem is controlled. Pertinent negatives include no chest pain or shortness of breath. Current antihypertension treatment includes beta blockers.     Patient reports recent diagnosis of breast cancer and currently undergoing chemotherapy, followed by Nataliia Smith, Oncology.    Review of Systems   Constitutional: Negative for fever.   Respiratory: Negative for shortness of breath.    Cardiovascular: Negative for chest pain.         Objective   Vital Signs:   BP 90/70 (BP Location: Left arm)   Pulse 71   Temp 96.6 °F (35.9 °C) (Temporal)   Resp 20   Ht 162.6 cm (64.02\")   Wt 93.4 kg (205 lb 12.8 oz)   SpO2 99%   BMI 35.30 kg/m²     Physical Exam  Constitutional:       Appearance: Normal appearance.   HENT:      Head: Normocephalic and atraumatic.   Cardiovascular:      Rate and Rhythm: Normal rate and regular rhythm.      Heart sounds: Normal heart sounds.   Pulmonary:      Effort: Pulmonary effort is normal. No respiratory distress.      Breath sounds: Normal breath sounds.   Musculoskeletal:      Cervical back: Neck supple. No tenderness.   Lymphadenopathy:      Cervical: No cervical adenopathy.   Skin:     General: Skin is warm and dry.   Neurological:      Mental Status: She is alert and oriented to person, place, and time.   Psychiatric:         Mood and Affect: Mood normal.        Result Review :   The following data was reviewed by: STACY Bacon on 12/02/2022:  CMP    CMP 9/14/22 11/14/22 11/21/22   Glucose 94 112 93   BUN 18 24 (A) 12   Creatinine 0.91 0.74 0.76   Sodium 138 138 135   Potassium 4.3 4.7 3.9   Chloride 101 103 100   Calcium 10.1 9.9 9.2   Albumin 4.70 4.50 4.00   Total Bilirubin 0.3 0.2 0.3   Alkaline Phosphatase 52 55 64   AST (SGOT) 20 18 24 "   ALT (SGPT) 14 13 11   (A) Abnormal value            Data reviewed: Office Note from STACY Baker, Oncology          Assessment and Plan    Diagnoses and all orders for this visit:    1. Primary hypertension (Primary)  Comments:  Controlled.  Continue Carvedilol 25mg BID and Spironolactone 25mg.  Will continue monitoring blood pressure.  Follow-up in 3 months for Annual Wellness Visit.          Follow Up   Return in about 3 months (around 3/2/2023) for Annual physical.  Patient was given instructions and counseling regarding her condition or for health maintenance advice. Please see specific information pulled into the AVS if appropriate.

## 2022-12-05 ENCOUNTER — OFFICE VISIT (OUTPATIENT)
Dept: ONCOLOGY | Facility: CLINIC | Age: 57
End: 2022-12-05

## 2022-12-05 ENCOUNTER — INFUSION (OUTPATIENT)
Dept: ONCOLOGY | Facility: HOSPITAL | Age: 57
End: 2022-12-05
Payer: COMMERCIAL

## 2022-12-05 ENCOUNTER — TELEPHONE (OUTPATIENT)
Dept: ONCOLOGY | Facility: CLINIC | Age: 57
End: 2022-12-05

## 2022-12-05 VITALS
HEIGHT: 64 IN | HEART RATE: 84 BPM | OXYGEN SATURATION: 98 % | DIASTOLIC BLOOD PRESSURE: 80 MMHG | BODY MASS INDEX: 35.67 KG/M2 | TEMPERATURE: 97.1 F | RESPIRATION RATE: 18 BRPM | WEIGHT: 208.9 LBS | SYSTOLIC BLOOD PRESSURE: 125 MMHG

## 2022-12-05 DIAGNOSIS — Z17.1 MALIGNANT NEOPLASM OF UPPER-INNER QUADRANT OF LEFT BREAST IN FEMALE, ESTROGEN RECEPTOR NEGATIVE: Primary | ICD-10-CM

## 2022-12-05 DIAGNOSIS — C50.212 MALIGNANT NEOPLASM OF UPPER-INNER QUADRANT OF LEFT BREAST IN FEMALE, ESTROGEN RECEPTOR NEGATIVE: ICD-10-CM

## 2022-12-05 DIAGNOSIS — Z45.2 ENCOUNTER FOR FITTING AND ADJUSTMENT OF VASCULAR CATHETER: ICD-10-CM

## 2022-12-05 DIAGNOSIS — C50.212 MALIGNANT NEOPLASM OF UPPER-INNER QUADRANT OF LEFT BREAST IN FEMALE, ESTROGEN RECEPTOR NEGATIVE: Primary | ICD-10-CM

## 2022-12-05 DIAGNOSIS — Z17.1 MALIGNANT NEOPLASM OF UPPER-INNER QUADRANT OF LEFT BREAST IN FEMALE, ESTROGEN RECEPTOR NEGATIVE: ICD-10-CM

## 2022-12-05 LAB
ALBUMIN SERPL-MCNC: 4.5 G/DL (ref 3.5–5.2)
ALBUMIN/GLOB SERPL: 1.7 G/DL
ALP SERPL-CCNC: 66 U/L (ref 39–117)
ALT SERPL W P-5'-P-CCNC: 19 U/L (ref 1–33)
ANION GAP SERPL CALCULATED.3IONS-SCNC: 8.1 MMOL/L (ref 5–15)
AST SERPL-CCNC: 14 U/L (ref 1–32)
BASOPHILS # BLD AUTO: 0.02 10*3/MM3 (ref 0–0.2)
BASOPHILS NFR BLD AUTO: 0.1 % (ref 0–1.5)
BILIRUB SERPL-MCNC: <0.2 MG/DL (ref 0–1.2)
BUN SERPL-MCNC: 19 MG/DL (ref 6–20)
BUN/CREAT SERPL: 23.8 (ref 7–25)
CALCIUM SPEC-SCNC: 9.6 MG/DL (ref 8.6–10.5)
CHLORIDE SERPL-SCNC: 101 MMOL/L (ref 98–107)
CO2 SERPL-SCNC: 24.9 MMOL/L (ref 22–29)
CREAT SERPL-MCNC: 0.8 MG/DL (ref 0.57–1)
DEPRECATED RDW RBC AUTO: 46.9 FL (ref 37–54)
EGFRCR SERPLBLD CKD-EPI 2021: 85 ML/MIN/1.73
EOSINOPHIL # BLD AUTO: 0 10*3/MM3 (ref 0–0.4)
EOSINOPHIL NFR BLD AUTO: 0 % (ref 0.3–6.2)
ERYTHROCYTE [DISTWIDTH] IN BLOOD BY AUTOMATED COUNT: 13.9 % (ref 12.3–15.4)
GLOBULIN UR ELPH-MCNC: 2.6 GM/DL
GLUCOSE SERPL-MCNC: 128 MG/DL (ref 65–99)
HCT VFR BLD AUTO: 31.5 % (ref 34–46.6)
HGB BLD-MCNC: 10.3 G/DL (ref 12–15.9)
IMM GRANULOCYTES # BLD AUTO: 0.22 10*3/MM3 (ref 0–0.05)
IMM GRANULOCYTES NFR BLD AUTO: 1.2 % (ref 0–0.5)
LYMPHOCYTES # BLD AUTO: 1.48 10*3/MM3 (ref 0.7–3.1)
LYMPHOCYTES NFR BLD AUTO: 7.9 % (ref 19.6–45.3)
MCH RBC QN AUTO: 31.2 PG (ref 26.6–33)
MCHC RBC AUTO-ENTMCNC: 32.7 G/DL (ref 31.5–35.7)
MCV RBC AUTO: 95.5 FL (ref 79–97)
MONOCYTES # BLD AUTO: 0.65 10*3/MM3 (ref 0.1–0.9)
MONOCYTES NFR BLD AUTO: 3.5 % (ref 5–12)
NEUTROPHILS NFR BLD AUTO: 16.29 10*3/MM3 (ref 1.7–7)
NEUTROPHILS NFR BLD AUTO: 87.3 % (ref 42.7–76)
NRBC BLD AUTO-RTO: 0 /100 WBC (ref 0–0.2)
PLATELET # BLD AUTO: 316 10*3/MM3 (ref 140–450)
PMV BLD AUTO: 10.8 FL (ref 6–12)
POTASSIUM SERPL-SCNC: 4.6 MMOL/L (ref 3.5–5.2)
PROT SERPL-MCNC: 7.1 G/DL (ref 6–8.5)
RBC # BLD AUTO: 3.3 10*6/MM3 (ref 3.77–5.28)
SODIUM SERPL-SCNC: 134 MMOL/L (ref 136–145)
WBC NRBC COR # BLD: 18.66 10*3/MM3 (ref 3.4–10.8)

## 2022-12-05 PROCEDURE — 25010000002 CYCLOPHOSPHAMIDE 1 GM/5ML SOLUTION 5 ML VIAL: Performed by: INTERNAL MEDICINE

## 2022-12-05 PROCEDURE — 25010000002 HEPARIN LOCK FLUSH PER 10 UNITS: Performed by: INTERNAL MEDICINE

## 2022-12-05 PROCEDURE — 96375 TX/PRO/DX INJ NEW DRUG ADDON: CPT

## 2022-12-05 PROCEDURE — 25010000002 DIPHENHYDRAMINE PER 50 MG: Performed by: INTERNAL MEDICINE

## 2022-12-05 PROCEDURE — 99214 OFFICE O/P EST MOD 30 MIN: CPT | Performed by: INTERNAL MEDICINE

## 2022-12-05 PROCEDURE — 25010000002 PALONOSETRON PER 25 MCG: Performed by: INTERNAL MEDICINE

## 2022-12-05 PROCEDURE — 96417 CHEMO IV INFUS EACH ADDL SEQ: CPT

## 2022-12-05 PROCEDURE — 96413 CHEMO IV INFUSION 1 HR: CPT

## 2022-12-05 PROCEDURE — 80053 COMPREHEN METABOLIC PANEL: CPT | Performed by: INTERNAL MEDICINE

## 2022-12-05 PROCEDURE — 85025 COMPLETE CBC W/AUTO DIFF WBC: CPT

## 2022-12-05 PROCEDURE — 25010000002 DOCETAXEL 20 MG/ML SOLUTION 8 ML VIAL: Performed by: INTERNAL MEDICINE

## 2022-12-05 RX ORDER — SODIUM CHLORIDE 9 MG/ML
1000 INJECTION, SOLUTION INTRAVENOUS ONCE
Status: CANCELLED
Start: 2022-12-12 | End: 2022-12-12

## 2022-12-05 RX ORDER — SODIUM CHLORIDE 0.9 % (FLUSH) 0.9 %
10 SYRINGE (ML) INJECTION AS NEEDED
Status: DISCONTINUED | OUTPATIENT
Start: 2022-12-05 | End: 2022-12-05 | Stop reason: HOSPADM

## 2022-12-05 RX ORDER — HEPARIN SODIUM (PORCINE) LOCK FLUSH IV SOLN 100 UNIT/ML 100 UNIT/ML
500 SOLUTION INTRAVENOUS AS NEEDED
Status: DISCONTINUED | OUTPATIENT
Start: 2022-12-05 | End: 2022-12-05 | Stop reason: HOSPADM

## 2022-12-05 RX ORDER — FAMOTIDINE 10 MG/ML
20 INJECTION, SOLUTION INTRAVENOUS ONCE
Status: COMPLETED | OUTPATIENT
Start: 2022-12-05 | End: 2022-12-05

## 2022-12-05 RX ORDER — SODIUM CHLORIDE 9 MG/ML
250 INJECTION, SOLUTION INTRAVENOUS ONCE
Status: CANCELLED | OUTPATIENT
Start: 2022-12-05

## 2022-12-05 RX ORDER — SODIUM CHLORIDE 9 MG/ML
500 INJECTION, SOLUTION INTRAVENOUS ONCE
Status: CANCELLED
Start: 2022-12-12 | End: 2022-12-12

## 2022-12-05 RX ORDER — PALONOSETRON 0.05 MG/ML
0.25 INJECTION, SOLUTION INTRAVENOUS ONCE
Status: COMPLETED | OUTPATIENT
Start: 2022-12-05 | End: 2022-12-05

## 2022-12-05 RX ORDER — FAMOTIDINE 10 MG/ML
20 INJECTION, SOLUTION INTRAVENOUS AS NEEDED
Status: CANCELLED | OUTPATIENT
Start: 2022-12-05

## 2022-12-05 RX ORDER — HEPARIN SODIUM (PORCINE) LOCK FLUSH IV SOLN 100 UNIT/ML 100 UNIT/ML
500 SOLUTION INTRAVENOUS AS NEEDED
Status: CANCELLED | OUTPATIENT
Start: 2022-12-05

## 2022-12-05 RX ORDER — DIPHENHYDRAMINE HYDROCHLORIDE 50 MG/ML
50 INJECTION INTRAMUSCULAR; INTRAVENOUS AS NEEDED
Status: CANCELLED | OUTPATIENT
Start: 2022-12-05

## 2022-12-05 RX ORDER — SODIUM CHLORIDE 0.9 % (FLUSH) 0.9 %
10 SYRINGE (ML) INJECTION AS NEEDED
Status: CANCELLED | OUTPATIENT
Start: 2022-12-05

## 2022-12-05 RX ORDER — SODIUM CHLORIDE 9 MG/ML
250 INJECTION, SOLUTION INTRAVENOUS ONCE
Status: COMPLETED | OUTPATIENT
Start: 2022-12-05 | End: 2022-12-05

## 2022-12-05 RX ORDER — PALONOSETRON 0.05 MG/ML
0.25 INJECTION, SOLUTION INTRAVENOUS ONCE
Status: CANCELLED | OUTPATIENT
Start: 2022-12-05

## 2022-12-05 RX ORDER — FAMOTIDINE 10 MG/ML
20 INJECTION, SOLUTION INTRAVENOUS ONCE
Status: CANCELLED | OUTPATIENT
Start: 2022-12-05

## 2022-12-05 RX ADMIN — Medication 10 ML: at 14:13

## 2022-12-05 RX ADMIN — PALONOSETRON 0.25 MG: 0.05 INJECTION, SOLUTION INTRAVENOUS at 09:52

## 2022-12-05 RX ADMIN — DIPHENHYDRAMINE HYDROCHLORIDE 25 MG: 50 INJECTION, SOLUTION INTRAMUSCULAR; INTRAVENOUS at 09:56

## 2022-12-05 RX ADMIN — Medication 500 UNITS: at 14:13

## 2022-12-05 RX ADMIN — SODIUM CHLORIDE 250 ML: 9 INJECTION, SOLUTION INTRAVENOUS at 09:51

## 2022-12-05 RX ADMIN — SODIUM CHLORIDE 150 MG: 9 INJECTION, SOLUTION INTRAVENOUS at 10:53

## 2022-12-05 RX ADMIN — CYCLOPHOSPHAMIDE 1200 MG: 200 INJECTION, SOLUTION INTRAVENOUS at 12:04

## 2022-12-05 RX ADMIN — FAMOTIDINE 20 MG: 10 INJECTION INTRAVENOUS at 09:52

## 2022-12-05 NOTE — PROGRESS NOTES
Subjective   Bebe Kiser is a 57 y.o. female.  Referred by Dr. Holley for left breast invasive ductal carcinoma, triple negative    History of Present Illness     Patient is a 57-year-old postmenopausal -American lady who presented with a screen detected abnormality of the left breast.    She denies any family history of breast cancer.  No previous abnormal mammograms or biopsies.  Comorbidities include hypertension.    8/4/2022-bilateral screening mammogram  Platelets are almost entirely fatty.  There is a new small, oval mass measuring 8 mm with indistinct margins, associated fine linear calcifications in the middle one third region of the left breast at 9:00.  No suspicious abnormalities of the right breast.    8/19/2022-left breast diagnostic mammogram  The breast is almost entirely fatty.  Additional evaluation of the left breast at 9:00 there is a small irregular mass measuring 6 mm with indistinct margins and associated fine linear calcifications in the middle one third region of the left breast, 10 cm from the nipple.    Left breast ultrasound  Ultrasound demonstrates an irregular heterogeneous mass with indistinct margins measuring 5 x 5 x 4 mm in the middle third, 9:30 position of the left breast, 10 cm from the nipple    Impression  Ultrasound-guided biopsy recommended.    8/25/2022-ultrasound-guided biopsy  Pathology consistent with invasive ductal carcinoma  Grade 3  Tumor size 0.7 cm  No lymphovascular space invasion  ER negative  MN negative  HER2 1+  Ki-67 58.07%    9/8/2022-genetic testing ordered.  Positive for PAL B2 pathogenic mutation.    She was evaluated by Dr. Holley on 9/8/2022 and referred for discussion of neoadjuvant versus adjuvant therapy.    9/27/2022-bilateral breast MRI-biopsy-proven malignancy in the left breast at 10 o'clock position measures 0.8 cm.  0.6 cm ill-defined focus of enhancement 1 cm anterior to the biopsy-proven malignancy.  Mammographically microcalcifications  are seen.  Correlation with left breast biopsy or surgical excision of the calcifications is recommended.  No suspicious findings in the right breast.    10/18/2022-bilateral mastectomy  1.right breast simple skin sparing mastectomy-benign breast parenchyma with no significant changes.  2.left axillary sentinel lymph node 1-benign  3.left axillary sentinel lymph node 2-benign  4.left breast simple skin sparing mastectomy  A.invasive ductal carcinoma, poorly differentiated, grade 3  Tumor measures 8 mm  Margins are negative for invasive carcinoma  No lymphovascular space invasion  B.clip and biopsy site changes  Seen on unremarkable skin and nipple    Receptors repeated and confirmed to be ER negative, FL negative, HER2 1+ on immunohistochemistry  Ki-67 65%    Patient presents to the clinic today accompanied by her daughter to discuss adjuvant therapy recommendations.  She is recovering well from surgery.  No tissue expanders placed and plans to perform reconstruction following completion of chemotherapy    Interval History:  Ms. Kiser presents for cycle 2 of Taxotere and cyclophosphamide today.  She is tolerated the first cycle fairly well.  She had some mild mucositis which improved with the mouthwash.  No diarrhea, arthralgias myalgias.  She felt tired about 3 days after the chemotherapy which resolved within a day or 2.  She feels like the IV fluids which were administered to be The chemotherapy did help her.   Denies any neuropathy.  She tells me today that her daughter underwent genetic testing and tested positive for PAL B2 mutation.      The following portions of the patient's history were reviewed and updated as appropriate: allergies, current medications, past family history, past medical history, past social history, past surgical history and problem list.    Past Medical History:   Diagnosis Date   • Breast cancer (HCC)     LEFT   • Cataract 2017   • Elevated cholesterol    • Hypertension         Past  Surgical History:   Procedure Laterality Date   • BREAST SURGERY  10/18/2022   • CATARACT EXTRACTION Bilateral    • COLONOSCOPY      2013   • MASTECTOMY WITH SENTINEL NODE BIOPSY AND AXILLARY NODE DISSECTION Bilateral 10/18/2022    Procedure: bilateral skin-sparing mastectomy and left sentinel lymph node biopsy;  Surgeon: Manisha Holley MD;  Location: [facility];  Service: General;  Laterality: Bilateral;   • VENOUS ACCESS DEVICE (PORT) INSERTION Right 10/18/2022    Procedure: Port placement;  Surgeon: Manisha Holley MD;  Location: [facility];  Service: General;  Laterality: Right;        Family History   Problem Relation Age of Onset   • Hypotension Mother    • Asthma Mother    • No Known Problems Father    • Hypertension Brother    • Breast cancer Neg Hx    • Ovarian cancer Neg Hx    • Uterine cancer Neg Hx    • Colon cancer Neg Hx    • Malig Hyperthermia Neg Hx         Social History     Socioeconomic History   • Marital status:    • Number of children: 3   Tobacco Use   • Smoking status: Never   • Smokeless tobacco: Never   Vaping Use   • Vaping Use: Never used   Substance and Sexual Activity   • Alcohol use: Not Currently     Comment: OCCASIONAL   • Drug use: No   • Sexual activity: Yes     Partners: Male     Birth control/protection: None        OB History        2    Para   2    Term   2            AB        Living   2       SAB        IAB        Ectopic        Molar        Multiple        Live Births   2             Age at menarche-10   3 para 2  1  Age at first live childbirth-20  Age at menopause-47  All contraceptive pill use-30 years  Hormone replacement therapy-none    No Known Allergies     Review of Systems   Constitutional: Negative.    HENT: Negative.    Eyes: Negative.    Respiratory: Negative.    Cardiovascular: Negative.    Gastrointestinal: Negative.    Endocrine: Negative.    Genitourinary: Negative.    Musculoskeletal: Negative.   "  Allergic/Immunologic: Negative.    Neurological: Negative.    Hematological: Negative.    Psychiatric/Behavioral: The patient is nervous/anxious.      Review of systems mentioned in the HPI    Objective   Blood pressure 125/80, pulse 84, temperature 97.1 °F (36.2 °C), temperature source Temporal, resp. rate 18, height 162.6 cm (64.02\"), weight 94.8 kg (208 lb 14.4 oz), SpO2 98 %, not currently breastfeeding.     Physical Exam  Vitals reviewed.   Constitutional:       Appearance: Normal appearance.   HENT:      Head: Normocephalic and atraumatic.      Nose: Nose normal.   Eyes:      Pupils: Pupils are equal, round, and reactive to light.   Cardiovascular:      Rate and Rhythm: Normal rate and regular rhythm.      Pulses: Normal pulses.      Heart sounds: Normal heart sounds.   Pulmonary:      Effort: Pulmonary effort is normal.   Abdominal:      General: Abdomen is flat.   Musculoskeletal:         General: Normal range of motion.      Cervical back: Normal range of motion.   Skin:     General: Skin is warm.   Neurological:      General: No focal deficit present.      Mental Status: She is alert and oriented to person, place, and time.   Psychiatric:         Mood and Affect: Mood normal.         Behavior: Behavior normal.         Thought Content: Thought content normal.         Judgment: Judgment normal.       Breast Exam: Status post bilateral mastectomy without reconstruction.  No expanders.    I have reexamined the patient and the results are consistent with the previously documented exam. Alba Bernardo MD     Infusion on 12/05/2022   Component Date Value Ref Range Status   • WBC 12/05/2022 18.66 (H)  3.40 - 10.80 10*3/mm3 Final   • RBC 12/05/2022 3.30 (L)  3.77 - 5.28 10*6/mm3 Final   • Hemoglobin 12/05/2022 10.3 (L)  12.0 - 15.9 g/dL Final   • Hematocrit 12/05/2022 31.5 (L)  34.0 - 46.6 % Final   • MCV 12/05/2022 95.5  79.0 - 97.0 fL Final   • MCH 12/05/2022 31.2  26.6 - 33.0 pg Final   • MCHC 12/05/2022 " 32.7  31.5 - 35.7 g/dL Final   • RDW 12/05/2022 13.9  12.3 - 15.4 % Final   • RDW-SD 12/05/2022 46.9  37.0 - 54.0 fl Final   • MPV 12/05/2022 10.8  6.0 - 12.0 fL Final   • Platelets 12/05/2022 316  140 - 450 10*3/mm3 Final   • Neutrophil % 12/05/2022 87.3 (H)  42.7 - 76.0 % Final   • Lymphocyte % 12/05/2022 7.9 (L)  19.6 - 45.3 % Final   • Monocyte % 12/05/2022 3.5 (L)  5.0 - 12.0 % Final   • Eosinophil % 12/05/2022 0.0 (L)  0.3 - 6.2 % Final   • Basophil % 12/05/2022 0.1  0.0 - 1.5 % Final   • Immature Grans % 12/05/2022 1.2 (H)  0.0 - 0.5 % Final   • Neutrophils, Absolute 12/05/2022 16.29 (H)  1.70 - 7.00 10*3/mm3 Final   • Lymphocytes, Absolute 12/05/2022 1.48  0.70 - 3.10 10*3/mm3 Final   • Monocytes, Absolute 12/05/2022 0.65  0.10 - 0.90 10*3/mm3 Final   • Eosinophils, Absolute 12/05/2022 0.00  0.00 - 0.40 10*3/mm3 Final   • Basophils, Absolute 12/05/2022 0.02  0.00 - 0.20 10*3/mm3 Final   • Immature Grans, Absolute 12/05/2022 0.22 (H)  0.00 - 0.05 10*3/mm3 Final   • nRBC 12/05/2022 0.0  0.0 - 0.2 /100 WBC Final   Infusion on 11/21/2022   Component Date Value Ref Range Status   • Glucose 11/21/2022 93  74 - 124 mg/dL Final   • BUN 11/21/2022 12  6 - 20 mg/dL Final   • Creatinine 11/21/2022 0.76  0.60 - 1.10 mg/dL Final   • Sodium 11/21/2022 135  134 - 145 mmol/L Final   • Potassium 11/21/2022 3.9  3.5 - 4.7 mmol/L Final   • Chloride 11/21/2022 100  98 - 107 mmol/L Final   • CO2 11/21/2022 23.5  22.0 - 29.0 mmol/L Final   • Calcium 11/21/2022 9.2  8.5 - 10.2 mg/dL Final   • Total Protein 11/21/2022 6.9  6.3 - 8.0 g/dL Final   • Albumin 11/21/2022 4.00  3.50 - 5.20 g/dL Final   • ALT (SGPT) 11/21/2022 11  0 - 33 U/L Final   • AST (SGOT) 11/21/2022 24  0 - 32 U/L Final   • Alkaline Phosphatase 11/21/2022 64  38 - 116 U/L Final   • Total Bilirubin 11/21/2022 0.3  0.2 - 1.2 mg/dL Final   • Globulin 11/21/2022 2.9  1.8 - 3.5 gm/dL Final   • A/G Ratio 11/21/2022 1.4  1.1 - 2.4 g/dL Final   • BUN/Creatinine Ratio  11/21/2022 15.8  7.3 - 30.0 Final   • Anion Gap 11/21/2022 11.5  5.0 - 15.0 mmol/L Final   • eGFR 11/21/2022 90.4  >60.0 mL/min/1.73 Final    National Kidney Foundation and American Society of Nephrology (ASN) Task Force recommended calculation based on the Chronic Kidney Disease Epidemiology Collaboration (CKD-EPI) equation refit without adjustment for race.   • WBC 11/21/2022 25.96 (H)  3.40 - 10.80 10*3/mm3 Final   • RBC 11/21/2022 3.41 (L)  3.77 - 5.28 10*6/mm3 Final   • Hemoglobin 11/21/2022 10.5 (L)  12.0 - 15.9 g/dL Final   • Hematocrit 11/21/2022 31.7 (L)  34.0 - 46.6 % Final   • MCV 11/21/2022 93.0  79.0 - 97.0 fL Final   • MCH 11/21/2022 30.8  26.6 - 33.0 pg Final   • MCHC 11/21/2022 33.1  31.5 - 35.7 g/dL Final   • RDW 11/21/2022 12.6  12.3 - 15.4 % Final   • RDW-SD 11/21/2022 43.0  37.0 - 54.0 fl Final   • MPV 11/21/2022 11.4  6.0 - 12.0 fL Final   • Platelets 11/21/2022 197  140 - 450 10*3/mm3 Final   • Neutrophil % 11/21/2022 56.2  42.7 - 76.0 % Final   • Lymphocyte % 11/21/2022 11.5 (L)  19.6 - 45.3 % Final   • Monocyte % 11/21/2022 13.8 (H)  5.0 - 12.0 % Final   • Eosinophil % 11/21/2022 0.2 (L)  0.3 - 6.2 % Final   • Basophil % 11/21/2022 0.1  0.0 - 1.5 % Final   • Immature Grans % 11/21/2022 18.2 (H)  0.0 - 0.5 % Final   • Neutrophils, Absolute 11/21/2022 14.58 (H)  1.70 - 7.00 10*3/mm3 Final   • Lymphocytes, Absolute 11/21/2022 2.99  0.70 - 3.10 10*3/mm3 Final   • Monocytes, Absolute 11/21/2022 3.58 (H)  0.10 - 0.90 10*3/mm3 Final   • Eosinophils, Absolute 11/21/2022 0.06  0.00 - 0.40 10*3/mm3 Final   • Basophils, Absolute 11/21/2022 0.03  0.00 - 0.20 10*3/mm3 Final   • Immature Grans, Absolute 11/21/2022 4.72 (H)  0.00 - 0.05 10*3/mm3 Final   • nRBC 11/21/2022 0.2  0.0 - 0.2 /100 WBC Final   Infusion on 11/14/2022   Component Date Value Ref Range Status   • Glucose 11/14/2022 112  74 - 124 mg/dL Final   • BUN 11/14/2022 24 (H)  6 - 20 mg/dL Final   • Creatinine 11/14/2022 0.74  0.60 - 1.10 mg/dL  Final   • Sodium 11/14/2022 138  134 - 145 mmol/L Final   • Potassium 11/14/2022 4.7  3.5 - 4.7 mmol/L Final   • Chloride 11/14/2022 103  98 - 107 mmol/L Final   • CO2 11/14/2022 22.4  22.0 - 29.0 mmol/L Final   • Calcium 11/14/2022 9.9  8.5 - 10.2 mg/dL Final   • Total Protein 11/14/2022 7.5  6.3 - 8.0 g/dL Final   • Albumin 11/14/2022 4.50  3.50 - 5.20 g/dL Final   • ALT (SGPT) 11/14/2022 13  0 - 33 U/L Final   • AST (SGOT) 11/14/2022 18  0 - 32 U/L Final   • Alkaline Phosphatase 11/14/2022 55  38 - 116 U/L Final   • Total Bilirubin 11/14/2022 0.2  0.2 - 1.2 mg/dL Final   • Globulin 11/14/2022 3.0  1.8 - 3.5 gm/dL Final   • A/G Ratio 11/14/2022 1.5  1.1 - 2.4 g/dL Final   • BUN/Creatinine Ratio 11/14/2022 32.4 (H)  7.3 - 30.0 Final   • Anion Gap 11/14/2022 12.6  5.0 - 15.0 mmol/L Final   • eGFR 11/14/2022 93.3  >60.0 mL/min/1.73 Final    National Kidney Foundation and American Society of Nephrology (ASN) Task Force recommended calculation based on the Chronic Kidney Disease Epidemiology Collaboration (CKD-EPI) equation refit without adjustment for race.   • WBC 11/14/2022 13.74 (H)  3.40 - 10.80 10*3/mm3 Final   • RBC 11/14/2022 3.57 (L)  3.77 - 5.28 10*6/mm3 Final   • Hemoglobin 11/14/2022 10.9 (L)  12.0 - 15.9 g/dL Final   • Hematocrit 11/14/2022 33.4 (L)  34.0 - 46.6 % Final   • MCV 11/14/2022 93.6  79.0 - 97.0 fL Final   • MCH 11/14/2022 30.5  26.6 - 33.0 pg Final   • MCHC 11/14/2022 32.6  31.5 - 35.7 g/dL Final   • RDW 11/14/2022 13.0  12.3 - 15.4 % Final   • RDW-SD 11/14/2022 44.6  37.0 - 54.0 fl Final   • MPV 11/14/2022 10.4  6.0 - 12.0 fL Final   • Platelets 11/14/2022 268  140 - 450 10*3/mm3 Final   • Neutrophil % 11/14/2022 79.6 (H)  42.7 - 76.0 % Final   • Lymphocyte % 11/14/2022 13.3 (L)  19.6 - 45.3 % Final   • Monocyte % 11/14/2022 6.3  5.0 - 12.0 % Final   • Eosinophil % 11/14/2022 0.1 (L)  0.3 - 6.2 % Final   • Basophil % 11/14/2022 0.1  0.0 - 1.5 % Final   • Immature Grans % 11/14/2022 0.6 (H)   0.0 - 0.5 % Final   • Neutrophils, Absolute 11/14/2022 10.94 (H)  1.70 - 7.00 10*3/mm3 Final   • Lymphocytes, Absolute 11/14/2022 1.83  0.70 - 3.10 10*3/mm3 Final   • Monocytes, Absolute 11/14/2022 0.86  0.10 - 0.90 10*3/mm3 Final   • Eosinophils, Absolute 11/14/2022 0.01  0.00 - 0.40 10*3/mm3 Final   • Basophils, Absolute 11/14/2022 0.02  0.00 - 0.20 10*3/mm3 Final   • Immature Grans, Absolute 11/14/2022 0.08 (H)  0.00 - 0.05 10*3/mm3 Final   • nRBC 11/14/2022 0.0  0.0 - 0.2 /100 WBC Final        No radiology results for the last 30 days.   Assessment & Plan       *Left breast invasive ductal carcinoma  · Grade 3, ER/KS negative, HER2 1+ on immunohistochemistry, Ki-67 58%  · Tumor measures 8 mm on mammogram and 5 mm on ultrasound  · On the biopsy the tumor measures 0.7 cm.  · MRI of the breast confirmed the tumor to be 0.8cm   · Status post bilateral mastectomy on 10/18/2022.  · Pathology consistent with invasive ductal carcinoma of the left breast measuring 8 mm, triple negative, grade 3, Ki-67 65%  · pT1b N0 M0, stage Ia clinical, stage Ib prognostic  · We discussed the benefit of chemotherapy and tumors which are over 5 mm and triple negative.  · Given that the tumor is under 1 cm we will proceed with Taxotere and cyclophosphamide.  · Cycle 1 TC administered 11/14/2022  · 12/5/2022 scheduled for cycle 2 TC  · Tolerating chemotherapy well.  · Labs reviewed and stable to proceed  · Cycle 3 chemotherapy will be administered 12/27/2022    *Pathogenic PAL B2 mutation  · We discussed the autosomal dominant inheritance of the PAL B2 mutation.  · Her daughter who is accompanying her to the visit is scheduled to undergo testing today.  · We also discussed the penetrance and the screening recommendations.  · She thinks that her paternal grandmother might have had pancreatic cancer but unsure.  · We discussed the increased risk of pancreatic cancer associated PAL B2 mutation.  Discussed screening recommendations of annual  MRCP or EUS for pancreatic cancer starting at the age of 50.  · Given that there is no clear first-degree relative with pancreatic cancer she would not meet guidelines recommendation for screening for pancreatic cancer.  · We discussed the signs and symptoms of pancreatic cancer including nausea, vomiting, epigastric pain and discomfort, abdominal distention, weight loss  · She does not have a family history of ovarian cancer hence there is no clear indication for risk reducing salpingo-oophorectomy.  Recommend that she discuss this further with her GYN.    *Hypertension-blood pressure 125/80    *Obesity-BMI 35.8    *Insomnia  · Monday through Wednesday after initiating dexamethasone.    · She reports that insomnia is better this time around.  · Continue with current dose of steroids      PLAN:   · 1 week for normal saline  · Cycle 3 TC in 3 weeks with APRN follow-up  ·  cycle 4 TC in 6 weeks and follow-up with myself    The patient is on high risk medication that requires close monitoring for toxicity.    Alba Bernardo MD  12/05/22

## 2022-12-06 ENCOUNTER — INFUSION (OUTPATIENT)
Dept: ONCOLOGY | Facility: HOSPITAL | Age: 57
End: 2022-12-06
Payer: COMMERCIAL

## 2022-12-06 DIAGNOSIS — Z17.1 MALIGNANT NEOPLASM OF UPPER-INNER QUADRANT OF LEFT BREAST IN FEMALE, ESTROGEN RECEPTOR NEGATIVE: Primary | ICD-10-CM

## 2022-12-06 DIAGNOSIS — C50.212 MALIGNANT NEOPLASM OF UPPER-INNER QUADRANT OF LEFT BREAST IN FEMALE, ESTROGEN RECEPTOR NEGATIVE: Primary | ICD-10-CM

## 2022-12-06 PROCEDURE — 96372 THER/PROPH/DIAG INJ SC/IM: CPT

## 2022-12-06 PROCEDURE — 25010000002 PEGFILGRASTIM-CBQV 6 MG/0.6ML SOLUTION PREFILLED SYRINGE: Performed by: INTERNAL MEDICINE

## 2022-12-06 RX ADMIN — PEGFILGRASTIM-CBQV 6 MG: 6 INJECTION, SOLUTION SUBCUTANEOUS at 13:28

## 2022-12-08 ENCOUNTER — PATIENT OUTREACH (OUTPATIENT)
Dept: OTHER | Facility: HOSPITAL | Age: 57
End: 2022-12-08

## 2022-12-12 ENCOUNTER — INFUSION (OUTPATIENT)
Dept: ONCOLOGY | Facility: HOSPITAL | Age: 57
End: 2022-12-12
Payer: COMMERCIAL

## 2022-12-12 VITALS
WEIGHT: 204.8 LBS | HEART RATE: 91 BPM | SYSTOLIC BLOOD PRESSURE: 113 MMHG | TEMPERATURE: 97.3 F | DIASTOLIC BLOOD PRESSURE: 73 MMHG | OXYGEN SATURATION: 97 % | RESPIRATION RATE: 16 BRPM | BODY MASS INDEX: 35.14 KG/M2

## 2022-12-12 DIAGNOSIS — Z17.1 MALIGNANT NEOPLASM OF UPPER-INNER QUADRANT OF LEFT BREAST IN FEMALE, ESTROGEN RECEPTOR NEGATIVE: Primary | ICD-10-CM

## 2022-12-12 DIAGNOSIS — C50.212 MALIGNANT NEOPLASM OF UPPER-INNER QUADRANT OF LEFT BREAST IN FEMALE, ESTROGEN RECEPTOR NEGATIVE: Primary | ICD-10-CM

## 2022-12-12 LAB
ALBUMIN SERPL-MCNC: 4 G/DL (ref 3.5–5.2)
ALBUMIN/GLOB SERPL: 1.4 G/DL (ref 1.1–2.4)
ALP SERPL-CCNC: 78 U/L (ref 38–116)
ALT SERPL W P-5'-P-CCNC: 9 U/L (ref 0–33)
ANION GAP SERPL CALCULATED.3IONS-SCNC: 11.6 MMOL/L (ref 5–15)
AST SERPL-CCNC: 20 U/L (ref 0–32)
BASOPHILS # BLD AUTO: 0.05 10*3/MM3 (ref 0–0.2)
BASOPHILS NFR BLD AUTO: 0.2 % (ref 0–1.5)
BILIRUB SERPL-MCNC: 0.3 MG/DL (ref 0.2–1.2)
BUN SERPL-MCNC: 10 MG/DL (ref 6–20)
BUN/CREAT SERPL: 14.1 (ref 7.3–30)
CALCIUM SPEC-SCNC: 9.5 MG/DL (ref 8.5–10.2)
CHLORIDE SERPL-SCNC: 100 MMOL/L (ref 98–107)
CO2 SERPL-SCNC: 24.4 MMOL/L (ref 22–29)
CREAT SERPL-MCNC: 0.71 MG/DL (ref 0.6–1.1)
DEPRECATED RDW RBC AUTO: 46.5 FL (ref 37–54)
EGFRCR SERPLBLD CKD-EPI 2021: 98.1 ML/MIN/1.73
EOSINOPHIL # BLD AUTO: 0.08 10*3/MM3 (ref 0–0.4)
EOSINOPHIL NFR BLD AUTO: 0.3 % (ref 0.3–6.2)
ERYTHROCYTE [DISTWIDTH] IN BLOOD BY AUTOMATED COUNT: 13.7 % (ref 12.3–15.4)
GLOBULIN UR ELPH-MCNC: 2.9 GM/DL (ref 1.8–3.5)
GLUCOSE SERPL-MCNC: 107 MG/DL (ref 74–124)
HCT VFR BLD AUTO: 31.6 % (ref 34–46.6)
HGB BLD-MCNC: 10.4 G/DL (ref 12–15.9)
IMM GRANULOCYTES # BLD AUTO: 5.81 10*3/MM3 (ref 0–0.05)
IMM GRANULOCYTES NFR BLD AUTO: 25.2 % (ref 0–0.5)
LYMPHOCYTES # BLD AUTO: 2.09 10*3/MM3 (ref 0.7–3.1)
LYMPHOCYTES NFR BLD AUTO: 9.1 % (ref 19.6–45.3)
MCH RBC QN AUTO: 30.8 PG (ref 26.6–33)
MCHC RBC AUTO-ENTMCNC: 32.9 G/DL (ref 31.5–35.7)
MCV RBC AUTO: 93.5 FL (ref 79–97)
MONOCYTES # BLD AUTO: 3.41 10*3/MM3 (ref 0.1–0.9)
MONOCYTES NFR BLD AUTO: 14.8 % (ref 5–12)
NEUTROPHILS NFR BLD AUTO: 11.59 10*3/MM3 (ref 1.7–7)
NEUTROPHILS NFR BLD AUTO: 50.4 % (ref 42.7–76)
NRBC BLD AUTO-RTO: 0.3 /100 WBC (ref 0–0.2)
PLATELET # BLD AUTO: 225 10*3/MM3 (ref 140–450)
PMV BLD AUTO: 11.8 FL (ref 6–12)
POTASSIUM SERPL-SCNC: 4.2 MMOL/L (ref 3.5–4.7)
PROT SERPL-MCNC: 6.9 G/DL (ref 6.3–8)
RBC # BLD AUTO: 3.38 10*6/MM3 (ref 3.77–5.28)
SODIUM SERPL-SCNC: 136 MMOL/L (ref 134–145)
WBC NRBC COR # BLD: 23.03 10*3/MM3 (ref 3.4–10.8)

## 2022-12-12 PROCEDURE — 80053 COMPREHEN METABOLIC PANEL: CPT

## 2022-12-12 PROCEDURE — 85025 COMPLETE CBC W/AUTO DIFF WBC: CPT

## 2022-12-12 PROCEDURE — 96360 HYDRATION IV INFUSION INIT: CPT

## 2022-12-12 RX ORDER — SODIUM CHLORIDE 9 MG/ML
500 INJECTION, SOLUTION INTRAVENOUS ONCE
Status: COMPLETED | OUTPATIENT
Start: 2022-12-12 | End: 2022-12-12

## 2022-12-12 RX ADMIN — SODIUM CHLORIDE 500 ML: 9 INJECTION, SOLUTION INTRAVENOUS at 14:52

## 2022-12-27 ENCOUNTER — INFUSION (OUTPATIENT)
Dept: ONCOLOGY | Facility: HOSPITAL | Age: 57
End: 2022-12-27
Payer: COMMERCIAL

## 2022-12-27 ENCOUNTER — APPOINTMENT (OUTPATIENT)
Dept: ONCOLOGY | Facility: HOSPITAL | Age: 57
End: 2022-12-27
Payer: COMMERCIAL

## 2022-12-27 ENCOUNTER — OFFICE VISIT (OUTPATIENT)
Dept: ONCOLOGY | Facility: CLINIC | Age: 57
End: 2022-12-27

## 2022-12-27 VITALS
HEART RATE: 86 BPM | WEIGHT: 210.7 LBS | SYSTOLIC BLOOD PRESSURE: 125 MMHG | TEMPERATURE: 96.6 F | RESPIRATION RATE: 16 BRPM | BODY MASS INDEX: 35.97 KG/M2 | OXYGEN SATURATION: 99 % | HEIGHT: 64 IN | DIASTOLIC BLOOD PRESSURE: 74 MMHG

## 2022-12-27 DIAGNOSIS — Z17.1 MALIGNANT NEOPLASM OF UPPER-INNER QUADRANT OF LEFT BREAST IN FEMALE, ESTROGEN RECEPTOR NEGATIVE: ICD-10-CM

## 2022-12-27 DIAGNOSIS — Z45.2 ENCOUNTER FOR FITTING AND ADJUSTMENT OF VASCULAR CATHETER: ICD-10-CM

## 2022-12-27 DIAGNOSIS — C50.212 MALIGNANT NEOPLASM OF UPPER-INNER QUADRANT OF LEFT BREAST IN FEMALE, ESTROGEN RECEPTOR NEGATIVE: ICD-10-CM

## 2022-12-27 DIAGNOSIS — Z17.1 MALIGNANT NEOPLASM OF UPPER-INNER QUADRANT OF LEFT BREAST IN FEMALE, ESTROGEN RECEPTOR NEGATIVE: Primary | ICD-10-CM

## 2022-12-27 DIAGNOSIS — C50.212 MALIGNANT NEOPLASM OF UPPER-INNER QUADRANT OF LEFT BREAST IN FEMALE, ESTROGEN RECEPTOR NEGATIVE: Primary | ICD-10-CM

## 2022-12-27 LAB
ALBUMIN SERPL-MCNC: 4.3 G/DL (ref 3.5–5.2)
ALBUMIN/GLOB SERPL: 1.6 G/DL (ref 1.1–2.4)
ALP SERPL-CCNC: 62 U/L (ref 38–116)
ALT SERPL W P-5'-P-CCNC: 24 U/L (ref 0–33)
ANION GAP SERPL CALCULATED.3IONS-SCNC: 12.5 MMOL/L (ref 5–15)
AST SERPL-CCNC: 19 U/L (ref 0–32)
BASOPHILS # BLD AUTO: 0.02 10*3/MM3 (ref 0–0.2)
BASOPHILS NFR BLD AUTO: 0.1 % (ref 0–1.5)
BILIRUB SERPL-MCNC: 0.2 MG/DL (ref 0.2–1.2)
BUN SERPL-MCNC: 24 MG/DL (ref 6–20)
BUN/CREAT SERPL: 32.4 (ref 7.3–30)
CALCIUM SPEC-SCNC: 10 MG/DL (ref 8.5–10.2)
CHLORIDE SERPL-SCNC: 99 MMOL/L (ref 98–107)
CO2 SERPL-SCNC: 24.5 MMOL/L (ref 22–29)
CREAT SERPL-MCNC: 0.74 MG/DL (ref 0.6–1.1)
DEPRECATED RDW RBC AUTO: 53.7 FL (ref 37–54)
EGFRCR SERPLBLD CKD-EPI 2021: 93.3 ML/MIN/1.73
EOSINOPHIL # BLD AUTO: 0 10*3/MM3 (ref 0–0.4)
EOSINOPHIL NFR BLD AUTO: 0 % (ref 0.3–6.2)
ERYTHROCYTE [DISTWIDTH] IN BLOOD BY AUTOMATED COUNT: 15.6 % (ref 12.3–15.4)
GLOBULIN UR ELPH-MCNC: 2.7 GM/DL (ref 1.8–3.5)
GLUCOSE SERPL-MCNC: 133 MG/DL (ref 74–124)
HCT VFR BLD AUTO: 30.2 % (ref 34–46.6)
HGB BLD-MCNC: 9.9 G/DL (ref 12–15.9)
IMM GRANULOCYTES # BLD AUTO: 0.14 10*3/MM3 (ref 0–0.05)
IMM GRANULOCYTES NFR BLD AUTO: 0.8 % (ref 0–0.5)
LYMPHOCYTES # BLD AUTO: 1.44 10*3/MM3 (ref 0.7–3.1)
LYMPHOCYTES NFR BLD AUTO: 8 % (ref 19.6–45.3)
MCH RBC QN AUTO: 31.3 PG (ref 26.6–33)
MCHC RBC AUTO-ENTMCNC: 32.8 G/DL (ref 31.5–35.7)
MCV RBC AUTO: 95.6 FL (ref 79–97)
MONOCYTES # BLD AUTO: 0.97 10*3/MM3 (ref 0.1–0.9)
MONOCYTES NFR BLD AUTO: 5.4 % (ref 5–12)
NEUTROPHILS NFR BLD AUTO: 15.35 10*3/MM3 (ref 1.7–7)
NEUTROPHILS NFR BLD AUTO: 85.7 % (ref 42.7–76)
NRBC BLD AUTO-RTO: 0 /100 WBC (ref 0–0.2)
PLATELET # BLD AUTO: 299 10*3/MM3 (ref 140–450)
PMV BLD AUTO: 10.9 FL (ref 6–12)
POTASSIUM SERPL-SCNC: 4.8 MMOL/L (ref 3.5–4.7)
PROT SERPL-MCNC: 7 G/DL (ref 6.3–8)
RBC # BLD AUTO: 3.16 10*6/MM3 (ref 3.77–5.28)
SODIUM SERPL-SCNC: 136 MMOL/L (ref 134–145)
WBC NRBC COR # BLD: 17.92 10*3/MM3 (ref 3.4–10.8)

## 2022-12-27 PROCEDURE — 99214 OFFICE O/P EST MOD 30 MIN: CPT | Performed by: INTERNAL MEDICINE

## 2022-12-27 PROCEDURE — 80053 COMPREHEN METABOLIC PANEL: CPT

## 2022-12-27 PROCEDURE — 25010000002 HEPARIN LOCK FLUSH PER 10 UNITS: Performed by: INTERNAL MEDICINE

## 2022-12-27 PROCEDURE — 25010000002 PALONOSETRON PER 25 MCG: Performed by: INTERNAL MEDICINE

## 2022-12-27 PROCEDURE — 25010000002 DOCETAXEL 20 MG/ML SOLUTION 8 ML VIAL: Performed by: INTERNAL MEDICINE

## 2022-12-27 PROCEDURE — 96417 CHEMO IV INFUS EACH ADDL SEQ: CPT

## 2022-12-27 PROCEDURE — 25010000002 DIPHENHYDRAMINE PER 50 MG: Performed by: INTERNAL MEDICINE

## 2022-12-27 PROCEDURE — 96375 TX/PRO/DX INJ NEW DRUG ADDON: CPT

## 2022-12-27 PROCEDURE — 25010000002 CYCLOPHOSPHAMIDE 1 GM/5ML SOLUTION 5 ML VIAL: Performed by: INTERNAL MEDICINE

## 2022-12-27 PROCEDURE — 96413 CHEMO IV INFUSION 1 HR: CPT

## 2022-12-27 PROCEDURE — 85025 COMPLETE CBC W/AUTO DIFF WBC: CPT

## 2022-12-27 RX ORDER — SODIUM CHLORIDE 9 MG/ML
250 INJECTION, SOLUTION INTRAVENOUS ONCE
Status: CANCELLED | OUTPATIENT
Start: 2022-12-27

## 2022-12-27 RX ORDER — PALONOSETRON 0.05 MG/ML
0.25 INJECTION, SOLUTION INTRAVENOUS ONCE
Status: COMPLETED | OUTPATIENT
Start: 2022-12-27 | End: 2022-12-27

## 2022-12-27 RX ORDER — FAMOTIDINE 10 MG/ML
20 INJECTION, SOLUTION INTRAVENOUS AS NEEDED
Status: CANCELLED | OUTPATIENT
Start: 2022-12-27

## 2022-12-27 RX ORDER — SODIUM CHLORIDE 0.9 % (FLUSH) 0.9 %
10 SYRINGE (ML) INJECTION AS NEEDED
Status: DISCONTINUED | OUTPATIENT
Start: 2022-12-27 | End: 2022-12-27 | Stop reason: HOSPADM

## 2022-12-27 RX ORDER — PALONOSETRON 0.05 MG/ML
0.25 INJECTION, SOLUTION INTRAVENOUS ONCE
Status: CANCELLED | OUTPATIENT
Start: 2022-12-27

## 2022-12-27 RX ORDER — FAMOTIDINE 10 MG/ML
20 INJECTION, SOLUTION INTRAVENOUS ONCE
Status: CANCELLED | OUTPATIENT
Start: 2022-12-27

## 2022-12-27 RX ORDER — SODIUM CHLORIDE 9 MG/ML
250 INJECTION, SOLUTION INTRAVENOUS ONCE
Status: COMPLETED | OUTPATIENT
Start: 2022-12-27 | End: 2022-12-27

## 2022-12-27 RX ORDER — HEPARIN SODIUM (PORCINE) LOCK FLUSH IV SOLN 100 UNIT/ML 100 UNIT/ML
500 SOLUTION INTRAVENOUS AS NEEDED
Status: DISCONTINUED | OUTPATIENT
Start: 2022-12-27 | End: 2022-12-27 | Stop reason: HOSPADM

## 2022-12-27 RX ORDER — SODIUM CHLORIDE 0.9 % (FLUSH) 0.9 %
10 SYRINGE (ML) INJECTION AS NEEDED
Status: CANCELLED | OUTPATIENT
Start: 2022-12-27

## 2022-12-27 RX ORDER — FAMOTIDINE 10 MG/ML
20 INJECTION, SOLUTION INTRAVENOUS ONCE
Status: COMPLETED | OUTPATIENT
Start: 2022-12-27 | End: 2022-12-27

## 2022-12-27 RX ORDER — HEPARIN SODIUM (PORCINE) LOCK FLUSH IV SOLN 100 UNIT/ML 100 UNIT/ML
500 SOLUTION INTRAVENOUS AS NEEDED
Status: CANCELLED | OUTPATIENT
Start: 2022-12-27

## 2022-12-27 RX ORDER — SODIUM CHLORIDE 9 MG/ML
1000 INJECTION, SOLUTION INTRAVENOUS ONCE
Status: CANCELLED
Start: 2023-01-03 | End: 2023-01-02

## 2022-12-27 RX ORDER — DIPHENHYDRAMINE HYDROCHLORIDE 50 MG/ML
50 INJECTION INTRAMUSCULAR; INTRAVENOUS AS NEEDED
Status: CANCELLED | OUTPATIENT
Start: 2022-12-27

## 2022-12-27 RX ADMIN — Medication 10 ML: at 13:15

## 2022-12-27 RX ADMIN — PALONOSETRON 0.25 MG: 0.05 INJECTION, SOLUTION INTRAVENOUS at 10:18

## 2022-12-27 RX ADMIN — Medication 500 UNITS: at 13:15

## 2022-12-27 RX ADMIN — SODIUM CHLORIDE 250 ML: 9 INJECTION, SOLUTION INTRAVENOUS at 10:18

## 2022-12-27 RX ADMIN — DIPHENHYDRAMINE HYDROCHLORIDE 25 MG: 50 INJECTION, SOLUTION INTRAMUSCULAR; INTRAVENOUS at 10:18

## 2022-12-27 RX ADMIN — CYCLOPHOSPHAMIDE 1200 MG: 200 INJECTION, SOLUTION INTRAVENOUS at 12:10

## 2022-12-27 RX ADMIN — FAMOTIDINE 20 MG: 10 INJECTION INTRAVENOUS at 10:19

## 2022-12-27 RX ADMIN — DOCETAXEL 150 MG: 20 INJECTION, SOLUTION, CONCENTRATE INTRAVENOUS at 11:03

## 2022-12-27 NOTE — PROGRESS NOTES
Subjective   Bebe Kiser is a 57 y.o. female.  Referred by Dr. Holley for left breast invasive ductal carcinoma, triple negative    History of Present Illness     Patient is a 57-year-old postmenopausal -American lady who presented with a screen detected abnormality of the left breast.    She denies any family history of breast cancer.  No previous abnormal mammograms or biopsies.  Comorbidities include hypertension.    8/4/2022-bilateral screening mammogram  Platelets are almost entirely fatty.  There is a new small, oval mass measuring 8 mm with indistinct margins, associated fine linear calcifications in the middle one third region of the left breast at 9:00.  No suspicious abnormalities of the right breast.    8/19/2022-left breast diagnostic mammogram  The breast is almost entirely fatty.  Additional evaluation of the left breast at 9:00 there is a small irregular mass measuring 6 mm with indistinct margins and associated fine linear calcifications in the middle one third region of the left breast, 10 cm from the nipple.    Left breast ultrasound  Ultrasound demonstrates an irregular heterogeneous mass with indistinct margins measuring 5 x 5 x 4 mm in the middle third, 9:30 position of the left breast, 10 cm from the nipple    Impression  Ultrasound-guided biopsy recommended.    8/25/2022-ultrasound-guided biopsy  Pathology consistent with invasive ductal carcinoma  Grade 3  Tumor size 0.7 cm  No lymphovascular space invasion  ER negative  DC negative  HER2 1+  Ki-67 58.07%    9/8/2022-genetic testing ordered.  Positive for PAL B2 pathogenic mutation.    She was evaluated by Dr. Holley on 9/8/2022 and referred for discussion of neoadjuvant versus adjuvant therapy.    9/27/2022-bilateral breast MRI-biopsy-proven malignancy in the left breast at 10 o'clock position measures 0.8 cm.  0.6 cm ill-defined focus of enhancement 1 cm anterior to the biopsy-proven malignancy.  Mammographically microcalcifications  are seen.  Correlation with left breast biopsy or surgical excision of the calcifications is recommended.  No suspicious findings in the right breast.    10/18/2022-bilateral mastectomy  1.right breast simple skin sparing mastectomy-benign breast parenchyma with no significant changes.  2.left axillary sentinel lymph node 1-benign  3.left axillary sentinel lymph node 2-benign  4.left breast simple skin sparing mastectomy  A.invasive ductal carcinoma, poorly differentiated, grade 3  Tumor measures 8 mm  Margins are negative for invasive carcinoma  No lymphovascular space invasion  B.clip and biopsy site changes  Seen on unremarkable skin and nipple    Receptors repeated and confirmed to be ER negative, DC negative, HER2 1+ on immunohistochemistry  Ki-67 65%    Patient presents to the clinic today accompanied by her daughter to discuss adjuvant therapy recommendations.  She is recovering well from surgery.  No tissue expanders placed and plans to perform reconstruction following completion of chemotherapy    Her daughter underwent genetic testing and tested positive for PAL B2 mutation.    Interval History:  Ms. Kiser presents for cycle 3 of Taxotere and cyclophosphamide today.    She denies any neuropathy.  Reports altered taste.  No nausea vomiting abdominal pain.      The following portions of the patient's history were reviewed and updated as appropriate: allergies, current medications, past family history, past medical history, past social history, past surgical history and problem list.    Past Medical History:   Diagnosis Date   • Breast cancer (HCC)     LEFT   • Cataract 2017   • Elevated cholesterol    • Hypertension         Past Surgical History:   Procedure Laterality Date   • BREAST SURGERY  10/18/2022   • CATARACT EXTRACTION Bilateral 2019   • COLONOSCOPY      2013   • MASTECTOMY WITH SENTINEL NODE BIOPSY AND AXILLARY NODE DISSECTION Bilateral 10/18/2022    Procedure: bilateral skin-sparing mastectomy  and left sentinel lymph node biopsy;  Surgeon: Manisha Holley MD;  Location: [facility] MAIN OR;  Service: General;  Laterality: Bilateral;   • VENOUS ACCESS DEVICE (PORT) INSERTION Right 10/18/2022    Procedure: Port placement;  Surgeon: Manisha Holley MD;  Location: [facility] MAIN OR;  Service: General;  Laterality: Right;        Family History   Problem Relation Age of Onset   • Hypotension Mother    • Asthma Mother    • No Known Problems Father    • Hypertension Brother    • Breast cancer Neg Hx    • Ovarian cancer Neg Hx    • Uterine cancer Neg Hx    • Colon cancer Neg Hx    • Malig Hyperthermia Neg Hx         Social History     Socioeconomic History   • Marital status:    • Number of children: 3   Tobacco Use   • Smoking status: Never   • Smokeless tobacco: Never   Vaping Use   • Vaping Use: Never used   Substance and Sexual Activity   • Alcohol use: Not Currently     Comment: OCCASIONAL   • Drug use: No   • Sexual activity: Yes     Partners: Male     Birth control/protection: None        OB History        2    Para   2    Term   2            AB        Living   2       SAB        IAB        Ectopic        Molar        Multiple        Live Births   2             Age at menarche-10   3 para 2  1  Age at first live childbirth-20  Age at menopause-47  All contraceptive pill use-30 years  Hormone replacement therapy-none    No Known Allergies     Review of Systems   Constitutional: Negative.    HENT: Negative.    Eyes: Negative.    Respiratory: Negative.    Cardiovascular: Negative.    Gastrointestinal: Negative.    Endocrine: Negative.    Genitourinary: Negative.    Musculoskeletal: Negative.    Allergic/Immunologic: Negative.    Neurological: Negative.    Hematological: Negative.    Psychiatric/Behavioral: The patient is nervous/anxious.      Review of systems as mentioned in the HPI    Objective   Blood pressure 125/74, pulse 86, temperature 96.6 °F (35.9 °C),  "temperature source Temporal, resp. rate 16, height 162.6 cm (64.02\"), weight 95.6 kg (210 lb 11.2 oz), SpO2 99 %, not currently breastfeeding.     Physical Exam  Vitals reviewed.   Constitutional:       Appearance: Normal appearance.   HENT:      Head: Normocephalic and atraumatic.      Nose: Nose normal.   Eyes:      Pupils: Pupils are equal, round, and reactive to light.   Cardiovascular:      Rate and Rhythm: Normal rate and regular rhythm.      Pulses: Normal pulses.      Heart sounds: Normal heart sounds.   Pulmonary:      Effort: Pulmonary effort is normal.   Abdominal:      General: Abdomen is flat.   Musculoskeletal:         General: Normal range of motion.      Cervical back: Normal range of motion.   Skin:     General: Skin is warm.   Neurological:      General: No focal deficit present.      Mental Status: She is alert and oriented to person, place, and time.   Psychiatric:         Mood and Affect: Mood normal.         Behavior: Behavior normal.         Thought Content: Thought content normal.         Judgment: Judgment normal.       Breast Exam: Status post bilateral mastectomy without reconstruction.  No expanders.    I have reexamined the patient and the results are consistent with the previously documented exam. Alba Bernardo MD     Infusion on 12/27/2022   Component Date Value Ref Range Status   • WBC 12/27/2022 17.92 (H)  3.40 - 10.80 10*3/mm3 Final   • RBC 12/27/2022 3.16 (L)  3.77 - 5.28 10*6/mm3 Final   • Hemoglobin 12/27/2022 9.9 (L)  12.0 - 15.9 g/dL Final   • Hematocrit 12/27/2022 30.2 (L)  34.0 - 46.6 % Final   • MCV 12/27/2022 95.6  79.0 - 97.0 fL Final   • MCH 12/27/2022 31.3  26.6 - 33.0 pg Final   • MCHC 12/27/2022 32.8  31.5 - 35.7 g/dL Final   • RDW 12/27/2022 15.6 (H)  12.3 - 15.4 % Final   • RDW-SD 12/27/2022 53.7  37.0 - 54.0 fl Final   • MPV 12/27/2022 10.9  6.0 - 12.0 fL Final   • Platelets 12/27/2022 299  140 - 450 10*3/mm3 Final   • Neutrophil % 12/27/2022 85.7 (H)  42.7 - " 76.0 % Final   • Lymphocyte % 12/27/2022 8.0 (L)  19.6 - 45.3 % Final   • Monocyte % 12/27/2022 5.4  5.0 - 12.0 % Final   • Eosinophil % 12/27/2022 0.0 (L)  0.3 - 6.2 % Final   • Basophil % 12/27/2022 0.1  0.0 - 1.5 % Final   • Immature Grans % 12/27/2022 0.8 (H)  0.0 - 0.5 % Final   • Neutrophils, Absolute 12/27/2022 15.35 (H)  1.70 - 7.00 10*3/mm3 Final   • Lymphocytes, Absolute 12/27/2022 1.44  0.70 - 3.10 10*3/mm3 Final   • Monocytes, Absolute 12/27/2022 0.97 (H)  0.10 - 0.90 10*3/mm3 Final   • Eosinophils, Absolute 12/27/2022 0.00  0.00 - 0.40 10*3/mm3 Final   • Basophils, Absolute 12/27/2022 0.02  0.00 - 0.20 10*3/mm3 Final   • Immature Grans, Absolute 12/27/2022 0.14 (H)  0.00 - 0.05 10*3/mm3 Final   • nRBC 12/27/2022 0.0  0.0 - 0.2 /100 WBC Final   Infusion on 12/12/2022   Component Date Value Ref Range Status   • Glucose 12/12/2022 107  74 - 124 mg/dL Final   • BUN 12/12/2022 10  6 - 20 mg/dL Final   • Creatinine 12/12/2022 0.71  0.60 - 1.10 mg/dL Final   • Sodium 12/12/2022 136  134 - 145 mmol/L Final   • Potassium 12/12/2022 4.2  3.5 - 4.7 mmol/L Final   • Chloride 12/12/2022 100  98 - 107 mmol/L Final   • CO2 12/12/2022 24.4  22.0 - 29.0 mmol/L Final   • Calcium 12/12/2022 9.5  8.5 - 10.2 mg/dL Final   • Total Protein 12/12/2022 6.9  6.3 - 8.0 g/dL Final   • Albumin 12/12/2022 4.00  3.50 - 5.20 g/dL Final   • ALT (SGPT) 12/12/2022 9  0 - 33 U/L Final   • AST (SGOT) 12/12/2022 20  0 - 32 U/L Final   • Alkaline Phosphatase 12/12/2022 78  38 - 116 U/L Final   • Total Bilirubin 12/12/2022 0.3  0.2 - 1.2 mg/dL Final   • Globulin 12/12/2022 2.9  1.8 - 3.5 gm/dL Final   • A/G Ratio 12/12/2022 1.4  1.1 - 2.4 g/dL Final   • BUN/Creatinine Ratio 12/12/2022 14.1  7.3 - 30.0 Final   • Anion Gap 12/12/2022 11.6  5.0 - 15.0 mmol/L Final   • eGFR 12/12/2022 98.1  >60.0 mL/min/1.73 Final    National Kidney Foundation and American Society of Nephrology (ASN) Task Force recommended calculation based on the Chronic Kidney  Disease Epidemiology Collaboration (CKD-EPI) equation refit without adjustment for race.   • WBC 12/12/2022 23.03 (H)  3.40 - 10.80 10*3/mm3 Final   • RBC 12/12/2022 3.38 (L)  3.77 - 5.28 10*6/mm3 Final   • Hemoglobin 12/12/2022 10.4 (L)  12.0 - 15.9 g/dL Final   • Hematocrit 12/12/2022 31.6 (L)  34.0 - 46.6 % Final   • MCV 12/12/2022 93.5  79.0 - 97.0 fL Final   • MCH 12/12/2022 30.8  26.6 - 33.0 pg Final   • MCHC 12/12/2022 32.9  31.5 - 35.7 g/dL Final   • RDW 12/12/2022 13.7  12.3 - 15.4 % Final   • RDW-SD 12/12/2022 46.5  37.0 - 54.0 fl Final   • MPV 12/12/2022 11.8  6.0 - 12.0 fL Final   • Platelets 12/12/2022 225  140 - 450 10*3/mm3 Final   • Neutrophil % 12/12/2022 50.4  42.7 - 76.0 % Final   • Lymphocyte % 12/12/2022 9.1 (L)  19.6 - 45.3 % Final   • Monocyte % 12/12/2022 14.8 (H)  5.0 - 12.0 % Final   • Eosinophil % 12/12/2022 0.3  0.3 - 6.2 % Final   • Basophil % 12/12/2022 0.2  0.0 - 1.5 % Final   • Immature Grans % 12/12/2022 25.2 (H)  0.0 - 0.5 % Final   • Neutrophils, Absolute 12/12/2022 11.59 (H)  1.70 - 7.00 10*3/mm3 Final   • Lymphocytes, Absolute 12/12/2022 2.09  0.70 - 3.10 10*3/mm3 Final   • Monocytes, Absolute 12/12/2022 3.41 (H)  0.10 - 0.90 10*3/mm3 Final   • Eosinophils, Absolute 12/12/2022 0.08  0.00 - 0.40 10*3/mm3 Final   • Basophils, Absolute 12/12/2022 0.05  0.00 - 0.20 10*3/mm3 Final   • Immature Grans, Absolute 12/12/2022 5.81 (H)  0.00 - 0.05 10*3/mm3 Final   • nRBC 12/12/2022 0.3 (H)  0.0 - 0.2 /100 WBC Final   Infusion on 12/05/2022   Component Date Value Ref Range Status   • Glucose 12/05/2022 128 (H)  65 - 99 mg/dL Final   • BUN 12/05/2022 19  6 - 20 mg/dL Final   • Creatinine 12/05/2022 0.80  0.57 - 1.00 mg/dL Final   • Sodium 12/05/2022 134 (L)  136 - 145 mmol/L Final   • Potassium 12/05/2022 4.6  3.5 - 5.2 mmol/L Final   • Chloride 12/05/2022 101  98 - 107 mmol/L Final   • CO2 12/05/2022 24.9  22.0 - 29.0 mmol/L Final   • Calcium 12/05/2022 9.6  8.6 - 10.5 mg/dL Final   • Total  Protein 12/05/2022 7.1  6.0 - 8.5 g/dL Final   • Albumin 12/05/2022 4.50  3.50 - 5.20 g/dL Final   • ALT (SGPT) 12/05/2022 19  1 - 33 U/L Final   • AST (SGOT) 12/05/2022 14  1 - 32 U/L Final   • Alkaline Phosphatase 12/05/2022 66  39 - 117 U/L Final   • Total Bilirubin 12/05/2022 <0.2  0.0 - 1.2 mg/dL Final   • Globulin 12/05/2022 2.6  gm/dL Final   • A/G Ratio 12/05/2022 1.7  g/dL Final   • BUN/Creatinine Ratio 12/05/2022 23.8  7.0 - 25.0 Final   • Anion Gap 12/05/2022 8.1  5.0 - 15.0 mmol/L Final   • eGFR 12/05/2022 85.0  >60.0 mL/min/1.73 Final    National Kidney Foundation and American Society of Nephrology (ASN) Task Force recommended calculation based on the Chronic Kidney Disease Epidemiology Collaboration (CKD-EPI) equation refit without adjustment for race.   • WBC 12/05/2022 18.66 (H)  3.40 - 10.80 10*3/mm3 Final   • RBC 12/05/2022 3.30 (L)  3.77 - 5.28 10*6/mm3 Final   • Hemoglobin 12/05/2022 10.3 (L)  12.0 - 15.9 g/dL Final   • Hematocrit 12/05/2022 31.5 (L)  34.0 - 46.6 % Final   • MCV 12/05/2022 95.5  79.0 - 97.0 fL Final   • MCH 12/05/2022 31.2  26.6 - 33.0 pg Final   • MCHC 12/05/2022 32.7  31.5 - 35.7 g/dL Final   • RDW 12/05/2022 13.9  12.3 - 15.4 % Final   • RDW-SD 12/05/2022 46.9  37.0 - 54.0 fl Final   • MPV 12/05/2022 10.8  6.0 - 12.0 fL Final   • Platelets 12/05/2022 316  140 - 450 10*3/mm3 Final   • Neutrophil % 12/05/2022 87.3 (H)  42.7 - 76.0 % Final   • Lymphocyte % 12/05/2022 7.9 (L)  19.6 - 45.3 % Final   • Monocyte % 12/05/2022 3.5 (L)  5.0 - 12.0 % Final   • Eosinophil % 12/05/2022 0.0 (L)  0.3 - 6.2 % Final   • Basophil % 12/05/2022 0.1  0.0 - 1.5 % Final   • Immature Grans % 12/05/2022 1.2 (H)  0.0 - 0.5 % Final   • Neutrophils, Absolute 12/05/2022 16.29 (H)  1.70 - 7.00 10*3/mm3 Final   • Lymphocytes, Absolute 12/05/2022 1.48  0.70 - 3.10 10*3/mm3 Final   • Monocytes, Absolute 12/05/2022 0.65  0.10 - 0.90 10*3/mm3 Final   • Eosinophils, Absolute 12/05/2022 0.00  0.00 - 0.40 10*3/mm3  Final   • Basophils, Absolute 12/05/2022 0.02  0.00 - 0.20 10*3/mm3 Final   • Immature Grans, Absolute 12/05/2022 0.22 (H)  0.00 - 0.05 10*3/mm3 Final   • nRBC 12/05/2022 0.0  0.0 - 0.2 /100 WBC Final        No radiology results for the last 30 days.   Assessment & Plan       *Left breast invasive ductal carcinoma  · Grade 3, ER/ND negative, HER2 1+ on immunohistochemistry, Ki-67 58%  · Tumor measures 8 mm on mammogram and 5 mm on ultrasound  · On the biopsy the tumor measures 0.7 cm.  · MRI of the breast confirmed the tumor to be 0.8cm   · Status post bilateral mastectomy on 10/18/2022.  · Pathology consistent with invasive ductal carcinoma of the left breast measuring 8 mm, triple negative, grade 3, Ki-67 65%  · pT1b N0 M0, stage Ia clinical, stage Ib prognostic  · We discussed the benefit of chemotherapy and tumors which are over 5 mm and triple negative.  · Given that the tumor is under 1 cm we will proceed with Taxotere and cyclophosphamide.  · Cycle 1 TC administered 11/14/2022  · 12/5/2022 scheduled for cycle 2 TC  · Tolerating chemotherapy well.  · Labs reviewed and stable to proceed  · Cycle 3 chemotherapy  12/27/2022   · CBC reviewed and hemoglobin 9.9  · CMP reviewed and stable.  · Proceed with chemotherapy today  · Discussed surveillance    *Pathogenic PAL B2 mutation  · We discussed the autosomal dominant inheritance of the PAL B2 mutation.  · Her daughter who is accompanying her to the visit is scheduled to undergo testing today.  · We also discussed the penetrance and the screening recommendations.  · She thinks that her paternal grandmother might have had pancreatic cancer but unsure.  · We discussed the increased risk of pancreatic cancer associated PAL B2 mutation.  Discussed screening recommendations of annual MRCP or EUS for pancreatic cancer starting at the age of 50.  · Given that there is no clear first-degree relative with pancreatic cancer she would not meet guidelines recommendation for  screening for pancreatic cancer.  · We discussed the signs and symptoms of pancreatic cancer including nausea, vomiting, epigastric pain and discomfort, abdominal distention, weight loss  · She does not have a family history of ovarian cancer hence there is no clear indication for risk reducing salpingo-oophorectomy.  Recommend that she discuss this further with her GYN.    *Hypertension-blood pressure 125/74    *Obesity-BMI 35.8    *Insomnia  · Secondary to steroids  · Dexamethasone dose has been decreased  · Continue with current dose    *Anemia  · Asymptomatic  · Monitor CBC    PLAN:   · 1 L normal saline in 1 week  · Cycle 4 TC in 3 weeks with APRN visit  · Follow-up with myself in 3 months    The patient is on high risk medication that requires close monitoring for toxicity.    Alba Bernardo MD  12/27/22

## 2022-12-28 ENCOUNTER — INFUSION (OUTPATIENT)
Dept: ONCOLOGY | Facility: HOSPITAL | Age: 57
End: 2022-12-28
Payer: COMMERCIAL

## 2022-12-28 DIAGNOSIS — Z17.1 MALIGNANT NEOPLASM OF UPPER-INNER QUADRANT OF LEFT BREAST IN FEMALE, ESTROGEN RECEPTOR NEGATIVE: Primary | ICD-10-CM

## 2022-12-28 DIAGNOSIS — C50.212 MALIGNANT NEOPLASM OF UPPER-INNER QUADRANT OF LEFT BREAST IN FEMALE, ESTROGEN RECEPTOR NEGATIVE: Primary | ICD-10-CM

## 2022-12-28 PROCEDURE — 25010000002 PEGFILGRASTIM-CBQV 6 MG/0.6ML SOLUTION PREFILLED SYRINGE: Performed by: INTERNAL MEDICINE

## 2022-12-28 PROCEDURE — 96372 THER/PROPH/DIAG INJ SC/IM: CPT

## 2022-12-28 RX ADMIN — PEGFILGRASTIM-CBQV 6 MG: 6 INJECTION, SOLUTION SUBCUTANEOUS at 13:13

## 2023-01-04 ENCOUNTER — INFUSION (OUTPATIENT)
Dept: ONCOLOGY | Facility: HOSPITAL | Age: 58
End: 2023-01-04
Payer: COMMERCIAL

## 2023-01-04 VITALS
HEART RATE: 106 BPM | OXYGEN SATURATION: 97 % | DIASTOLIC BLOOD PRESSURE: 75 MMHG | SYSTOLIC BLOOD PRESSURE: 102 MMHG | TEMPERATURE: 97.7 F | WEIGHT: 210.2 LBS | RESPIRATION RATE: 16 BRPM | BODY MASS INDEX: 36.06 KG/M2

## 2023-01-04 DIAGNOSIS — Z17.1 MALIGNANT NEOPLASM OF UPPER-INNER QUADRANT OF LEFT BREAST IN FEMALE, ESTROGEN RECEPTOR NEGATIVE: Primary | ICD-10-CM

## 2023-01-04 DIAGNOSIS — Z45.2 ENCOUNTER FOR FITTING AND ADJUSTMENT OF VASCULAR CATHETER: ICD-10-CM

## 2023-01-04 DIAGNOSIS — C50.212 MALIGNANT NEOPLASM OF UPPER-INNER QUADRANT OF LEFT BREAST IN FEMALE, ESTROGEN RECEPTOR NEGATIVE: Primary | ICD-10-CM

## 2023-01-04 LAB
ALBUMIN SERPL-MCNC: 3.8 G/DL (ref 3.5–5.2)
ALBUMIN/GLOB SERPL: 1.5 G/DL (ref 1.1–2.4)
ALP SERPL-CCNC: 134 U/L (ref 38–116)
ALT SERPL W P-5'-P-CCNC: 10 U/L (ref 0–33)
ANION GAP SERPL CALCULATED.3IONS-SCNC: 9.8 MMOL/L (ref 5–15)
AST SERPL-CCNC: 20 U/L (ref 0–32)
BASOPHILS # BLD AUTO: 0.04 10*3/MM3 (ref 0–0.2)
BASOPHILS NFR BLD AUTO: 0.1 % (ref 0–1.5)
BILIRUB SERPL-MCNC: <0.2 MG/DL (ref 0.2–1.2)
BUN SERPL-MCNC: 13 MG/DL (ref 6–20)
BUN/CREAT SERPL: 16.3 (ref 7.3–30)
CALCIUM SPEC-SCNC: 9.2 MG/DL (ref 8.5–10.2)
CHLORIDE SERPL-SCNC: 105 MMOL/L (ref 98–107)
CO2 SERPL-SCNC: 24.2 MMOL/L (ref 22–29)
CREAT SERPL-MCNC: 0.8 MG/DL (ref 0.6–1.1)
DEPRECATED RDW RBC AUTO: 52.1 FL (ref 37–54)
EGFRCR SERPLBLD CKD-EPI 2021: 85 ML/MIN/1.73
EOSINOPHIL # BLD AUTO: 0.05 10*3/MM3 (ref 0–0.4)
EOSINOPHIL NFR BLD AUTO: 0.2 % (ref 0.3–6.2)
ERYTHROCYTE [DISTWIDTH] IN BLOOD BY AUTOMATED COUNT: 15.1 % (ref 12.3–15.4)
GLOBULIN UR ELPH-MCNC: 2.5 GM/DL (ref 1.8–3.5)
GLUCOSE SERPL-MCNC: 113 MG/DL (ref 74–124)
HCT VFR BLD AUTO: 29.5 % (ref 34–46.6)
HGB BLD-MCNC: 9.7 G/DL (ref 12–15.9)
IMM GRANULOCYTES # BLD AUTO: 6.75 10*3/MM3 (ref 0–0.05)
IMM GRANULOCYTES NFR BLD AUTO: 22.4 % (ref 0–0.5)
LYMPHOCYTES # BLD AUTO: 2.69 10*3/MM3 (ref 0.7–3.1)
LYMPHOCYTES NFR BLD AUTO: 8.9 % (ref 19.6–45.3)
MCH RBC QN AUTO: 31.6 PG (ref 26.6–33)
MCHC RBC AUTO-ENTMCNC: 32.9 G/DL (ref 31.5–35.7)
MCV RBC AUTO: 96.1 FL (ref 79–97)
MONOCYTES # BLD AUTO: 2.85 10*3/MM3 (ref 0.1–0.9)
MONOCYTES NFR BLD AUTO: 9.5 % (ref 5–12)
NEUTROPHILS NFR BLD AUTO: 17.72 10*3/MM3 (ref 1.7–7)
NEUTROPHILS NFR BLD AUTO: 58.9 % (ref 42.7–76)
NRBC BLD AUTO-RTO: 0.1 /100 WBC (ref 0–0.2)
PLATELET # BLD AUTO: 266 10*3/MM3 (ref 140–450)
PMV BLD AUTO: 11.5 FL (ref 6–12)
POTASSIUM SERPL-SCNC: 4.2 MMOL/L (ref 3.5–4.7)
PROT SERPL-MCNC: 6.3 G/DL (ref 6.3–8)
RBC # BLD AUTO: 3.07 10*6/MM3 (ref 3.77–5.28)
SODIUM SERPL-SCNC: 139 MMOL/L (ref 134–145)
WBC NRBC COR # BLD: 30.1 10*3/MM3 (ref 3.4–10.8)

## 2023-01-04 PROCEDURE — 80053 COMPREHEN METABOLIC PANEL: CPT

## 2023-01-04 PROCEDURE — 96360 HYDRATION IV INFUSION INIT: CPT

## 2023-01-04 PROCEDURE — 25010000002 HEPARIN LOCK FLUSH PER 10 UNITS: Performed by: INTERNAL MEDICINE

## 2023-01-04 PROCEDURE — 85025 COMPLETE CBC W/AUTO DIFF WBC: CPT

## 2023-01-04 RX ORDER — HEPARIN SODIUM (PORCINE) LOCK FLUSH IV SOLN 100 UNIT/ML 100 UNIT/ML
500 SOLUTION INTRAVENOUS AS NEEDED
Status: DISCONTINUED | OUTPATIENT
Start: 2023-01-04 | End: 2023-01-04 | Stop reason: HOSPADM

## 2023-01-04 RX ORDER — SODIUM CHLORIDE 9 MG/ML
1000 INJECTION, SOLUTION INTRAVENOUS ONCE
Status: COMPLETED | OUTPATIENT
Start: 2023-01-04 | End: 2023-01-04

## 2023-01-04 RX ORDER — HEPARIN SODIUM (PORCINE) LOCK FLUSH IV SOLN 100 UNIT/ML 100 UNIT/ML
500 SOLUTION INTRAVENOUS AS NEEDED
Status: CANCELLED | OUTPATIENT
Start: 2023-01-04

## 2023-01-04 RX ORDER — SODIUM CHLORIDE 0.9 % (FLUSH) 0.9 %
10 SYRINGE (ML) INJECTION AS NEEDED
Status: CANCELLED | OUTPATIENT
Start: 2023-01-04

## 2023-01-04 RX ORDER — SODIUM CHLORIDE 0.9 % (FLUSH) 0.9 %
10 SYRINGE (ML) INJECTION AS NEEDED
Status: DISCONTINUED | OUTPATIENT
Start: 2023-01-04 | End: 2023-01-04 | Stop reason: HOSPADM

## 2023-01-04 RX ADMIN — SODIUM CHLORIDE 1000 ML: 9 INJECTION, SOLUTION INTRAVENOUS at 14:43

## 2023-01-04 RX ADMIN — Medication 10 ML: at 16:05

## 2023-01-04 RX ADMIN — Medication 500 UNITS: at 16:05

## 2023-01-13 NOTE — PROGRESS NOTES
Subjective   Bebe Kiser is a 57 y.o. female.  Referred by Dr. Holley for left breast invasive ductal carcinoma, triple negative    History of Present Illness     Patient is a 57-year-old postmenopausal -American lady who presented with a screen detected abnormality of the left breast.    She denies any family history of breast cancer.  No previous abnormal mammograms or biopsies.  Comorbidities include hypertension.    8/4/2022-bilateral screening mammogram  Platelets are almost entirely fatty.  There is a new small, oval mass measuring 8 mm with indistinct margins, associated fine linear calcifications in the middle one third region of the left breast at 9:00.  No suspicious abnormalities of the right breast.    8/19/2022-left breast diagnostic mammogram  The breast is almost entirely fatty.  Additional evaluation of the left breast at 9:00 there is a small irregular mass measuring 6 mm with indistinct margins and associated fine linear calcifications in the middle one third region of the left breast, 10 cm from the nipple.    Left breast ultrasound  Ultrasound demonstrates an irregular heterogeneous mass with indistinct margins measuring 5 x 5 x 4 mm in the middle third, 9:30 position of the left breast, 10 cm from the nipple    Impression  Ultrasound-guided biopsy recommended.    8/25/2022-ultrasound-guided biopsy  Pathology consistent with invasive ductal carcinoma  Grade 3  Tumor size 0.7 cm  No lymphovascular space invasion  ER negative  IA negative  HER2 1+  Ki-67 58.07%    9/8/2022-genetic testing ordered.  Positive for PAL B2 pathogenic mutation.    She was evaluated by Dr. Holley on 9/8/2022 and referred for discussion of neoadjuvant versus adjuvant therapy.    9/27/2022-bilateral breast MRI-biopsy-proven malignancy in the left breast at 10 o'clock position measures 0.8 cm.  0.6 cm ill-defined focus of enhancement 1 cm anterior to the biopsy-proven malignancy.  Mammographically microcalcifications  are seen.  Correlation with left breast biopsy or surgical excision of the calcifications is recommended.  No suspicious findings in the right breast.    10/18/2022-bilateral mastectomy  1.right breast simple skin sparing mastectomy-benign breast parenchyma with no significant changes.  2.left axillary sentinel lymph node 1-benign  3.left axillary sentinel lymph node 2-benign  4.left breast simple skin sparing mastectomy  A.invasive ductal carcinoma, poorly differentiated, grade 3  Tumor measures 8 mm  Margins are negative for invasive carcinoma  No lymphovascular space invasion  B.clip and biopsy site changes  Seen on unremarkable skin and nipple    Receptors repeated and confirmed to be ER negative, AL negative, HER2 1+ on immunohistochemistry  Ki-67 65%    Patient presents to the clinic today accompanied by her daughter to discuss adjuvant therapy recommendations.  She is recovering well from surgery.  No tissue expanders placed and plans to perform reconstruction following completion of chemotherapy    Her daughter underwent genetic testing and tested positive for PAL B2 mutation.    Interval History:  The patient returns today for follow-up and evaluation prior to cycle #4 TC. She continues to tolerate treatment well.  She has occasional numbness/cold feeling in her toes that lasts a few minutes then resolves, this occurs every few days.  She reports this has been ongoing since cycle 2 TC and has remained stable since then.  She is eating and drinking well.  Bowels moving regularly.  Denies fever or chills. Denies nausea or vomiting.  Denies new or worsening pain.  No new concerns today.    The following portions of the patient's history were reviewed and updated as appropriate: allergies, current medications, past family history, past medical history, past social history, past surgical history and problem list.    Past Medical History:   Diagnosis Date   • Breast cancer (HCC)     LEFT   • Cataract 2017   •  Elevated cholesterol    • Hypertension         Past Surgical History:   Procedure Laterality Date   • BREAST SURGERY  10/18/2022   • CATARACT EXTRACTION Bilateral 2019   • COLONOSCOPY      2013   • MASTECTOMY WITH SENTINEL NODE BIOPSY AND AXILLARY NODE DISSECTION Bilateral 10/18/2022    Procedure: bilateral skin-sparing mastectomy and left sentinel lymph node biopsy;  Surgeon: Manisha Holley MD;  Location: [facility];  Service: General;  Laterality: Bilateral;   • VENOUS ACCESS DEVICE (PORT) INSERTION Right 10/18/2022    Procedure: Port placement;  Surgeon: Manisha Holley MD;  Location: [facility];  Service: General;  Laterality: Right;        Family History   Problem Relation Age of Onset   • Hypotension Mother    • Asthma Mother    • No Known Problems Father    • Hypertension Brother    • Breast cancer Neg Hx    • Ovarian cancer Neg Hx    • Uterine cancer Neg Hx    • Colon cancer Neg Hx    • Malig Hyperthermia Neg Hx         Social History     Socioeconomic History   • Marital status:    • Number of children: 3   Tobacco Use   • Smoking status: Never   • Smokeless tobacco: Never   Vaping Use   • Vaping Use: Never used   Substance and Sexual Activity   • Alcohol use: Not Currently     Comment: OCCASIONAL   • Drug use: No   • Sexual activity: Yes     Partners: Male     Birth control/protection: None        OB History        2    Para   2    Term   2            AB        Living   2       SAB        IAB        Ectopic        Molar        Multiple        Live Births   2             Age at menarche-10   3 para 2  1  Age at first live childbirth-20  Age at menopause-47  All contraceptive pill use-30 years  Hormone replacement therapy-none    No Known Allergies     Review of Systems   Constitutional: Negative.    HENT: Negative.    Eyes: Negative.    Respiratory: Negative.    Cardiovascular: Negative.    Gastrointestinal: Negative.    Endocrine: Negative.   "  Genitourinary: Negative.    Musculoskeletal: Negative.    Allergic/Immunologic: Negative.    Neurological: Negative.    Hematological: Negative.    Psychiatric/Behavioral: The patient is nervous/anxious.      Review of systems as mentioned in the HPI    Objective   Blood pressure 124/78, pulse 86, temperature 97.1 °F (36.2 °C), temperature source Temporal, resp. rate 18, height 162.6 cm (64.02\"), weight 96.8 kg (213 lb 4.8 oz), SpO2 98 %, not currently breastfeeding.     Physical Exam  Vitals reviewed.   Constitutional:       Appearance: Normal appearance.   HENT:      Head: Normocephalic and atraumatic.      Nose: Nose normal.      Mouth/Throat:      Mouth: Mucous membranes are moist.      Pharynx: Oropharynx is clear.   Eyes:      Conjunctiva/sclera: Conjunctivae normal.      Pupils: Pupils are equal, round, and reactive to light.   Cardiovascular:      Rate and Rhythm: Normal rate and regular rhythm.      Pulses: Normal pulses.      Heart sounds: Normal heart sounds.   Pulmonary:      Effort: Pulmonary effort is normal.      Breath sounds: Normal breath sounds.   Abdominal:      General: Abdomen is flat. Bowel sounds are normal.   Musculoskeletal:         General: Normal range of motion.      Cervical back: Normal range of motion.   Skin:     General: Skin is warm and dry.      Findings: No rash.   Neurological:      General: No focal deficit present.      Mental Status: She is alert and oriented to person, place, and time.      Motor: No weakness.   Psychiatric:         Mood and Affect: Mood normal.         Behavior: Behavior normal.         Thought Content: Thought content normal.         Judgment: Judgment normal.       Breast Exam: Status post bilateral mastectomy without reconstruction.  No expanders.    I have reexamined the patient and the results are consistent with the previously documented exam. STACY Baker     Infusion on 01/16/2023   Component Date Value Ref Range Status   • Glucose " 01/16/2023 140 (H)  74 - 124 mg/dL Final   • BUN 01/16/2023 17  6 - 20 mg/dL Final   • Creatinine 01/16/2023 0.64  0.60 - 1.10 mg/dL Final   • Sodium 01/16/2023 139  134 - 145 mmol/L Final   • Potassium 01/16/2023 4.3  3.5 - 4.7 mmol/L Final   • Chloride 01/16/2023 104  98 - 107 mmol/L Final   • CO2 01/16/2023 22.6  22.0 - 29.0 mmol/L Final   • Calcium 01/16/2023 9.6  8.5 - 10.2 mg/dL Final   • Total Protein 01/16/2023 6.8  6.3 - 8.0 g/dL Final   • Albumin 01/16/2023 4.2  3.5 - 5.2 g/dL Final   • ALT (SGPT) 01/16/2023 15  0 - 33 U/L Final   • AST (SGOT) 01/16/2023 17  0 - 32 U/L Final   • Alkaline Phosphatase 01/16/2023 56  38 - 116 U/L Final   • Total Bilirubin 01/16/2023 0.2  0.2 - 1.2 mg/dL Final   • Globulin 01/16/2023 2.6  1.8 - 3.5 gm/dL Final   • A/G Ratio 01/16/2023 1.6  1.1 - 2.4 g/dL Final   • BUN/Creatinine Ratio 01/16/2023 26.6  7.3 - 30.0 Final   • Anion Gap 01/16/2023 12.4  5.0 - 15.0 mmol/L Final   • eGFR 01/16/2023 101.9  >60.0 mL/min/1.73 Final    National Kidney Foundation and American Society of Nephrology (ASN) Task Force recommended calculation based on the Chronic Kidney Disease Epidemiology Collaboration (CKD-EPI) equation refit without adjustment for race.   • WBC 01/16/2023 18.59 (H)  3.40 - 10.80 10*3/mm3 Final   • RBC 01/16/2023 3.07 (L)  3.77 - 5.28 10*6/mm3 Final   • Hemoglobin 01/16/2023 10.0 (L)  12.0 - 15.9 g/dL Final   • Hematocrit 01/16/2023 30.1 (L)  34.0 - 46.6 % Final   • MCV 01/16/2023 98.0 (H)  79.0 - 97.0 fL Final   • MCH 01/16/2023 32.6  26.6 - 33.0 pg Final   • MCHC 01/16/2023 33.2  31.5 - 35.7 g/dL Final   • RDW 01/16/2023 16.3 (H)  12.3 - 15.4 % Final   • RDW-SD 01/16/2023 57.3 (H)  37.0 - 54.0 fl Final   • MPV 01/16/2023 10.7  6.0 - 12.0 fL Final   • Platelets 01/16/2023 257  140 - 450 10*3/mm3 Final   • Neutrophil % 01/16/2023 83.4 (H)  42.7 - 76.0 % Final   • Lymphocyte % 01/16/2023 8.6 (L)  19.6 - 45.3 % Final   • Monocyte % 01/16/2023 7.0  5.0 - 12.0 % Final   •  Eosinophil % 01/16/2023 0.0 (L)  0.3 - 6.2 % Final   • Basophil % 01/16/2023 0.1  0.0 - 1.5 % Final   • Immature Grans % 01/16/2023 0.9 (H)  0.0 - 0.5 % Final   • Neutrophils, Absolute 01/16/2023 15.51 (H)  1.70 - 7.00 10*3/mm3 Final   • Lymphocytes, Absolute 01/16/2023 1.59  0.70 - 3.10 10*3/mm3 Final   • Monocytes, Absolute 01/16/2023 1.30 (H)  0.10 - 0.90 10*3/mm3 Final   • Eosinophils, Absolute 01/16/2023 0.00  0.00 - 0.40 10*3/mm3 Final   • Basophils, Absolute 01/16/2023 0.02  0.00 - 0.20 10*3/mm3 Final   • Immature Grans, Absolute 01/16/2023 0.17 (H)  0.00 - 0.05 10*3/mm3 Final   • nRBC 01/16/2023 0.1  0.0 - 0.2 /100 WBC Final   Infusion on 01/04/2023   Component Date Value Ref Range Status   • Glucose 01/04/2023 113  74 - 124 mg/dL Final   • BUN 01/04/2023 13  6 - 20 mg/dL Final   • Creatinine 01/04/2023 0.80  0.60 - 1.10 mg/dL Final   • Sodium 01/04/2023 139  134 - 145 mmol/L Final   • Potassium 01/04/2023 4.2  3.5 - 4.7 mmol/L Final   • Chloride 01/04/2023 105  98 - 107 mmol/L Final   • CO2 01/04/2023 24.2  22.0 - 29.0 mmol/L Final   • Calcium 01/04/2023 9.2  8.5 - 10.2 mg/dL Final   • Total Protein 01/04/2023 6.3  6.3 - 8.0 g/dL Final   • Albumin 01/04/2023 3.8  3.5 - 5.2 g/dL Final   • ALT (SGPT) 01/04/2023 10  0 - 33 U/L Final   • AST (SGOT) 01/04/2023 20  0 - 32 U/L Final   • Alkaline Phosphatase 01/04/2023 134 (H)  38 - 116 U/L Final   • Total Bilirubin 01/04/2023 <0.2 (L)  0.2 - 1.2 mg/dL Final   • Globulin 01/04/2023 2.5  1.8 - 3.5 gm/dL Final   • A/G Ratio 01/04/2023 1.5  1.1 - 2.4 g/dL Final   • BUN/Creatinine Ratio 01/04/2023 16.3  7.3 - 30.0 Final   • Anion Gap 01/04/2023 9.8  5.0 - 15.0 mmol/L Final   • eGFR 01/04/2023 85.0  >60.0 mL/min/1.73 Final    National Kidney Foundation and American Society of Nephrology (ASN) Task Force recommended calculation based on the Chronic Kidney Disease Epidemiology Collaboration (CKD-EPI) equation refit without adjustment for race.   • WBC 01/04/2023 30.10 (H)   3.40 - 10.80 10*3/mm3 Final   • RBC 01/04/2023 3.07 (L)  3.77 - 5.28 10*6/mm3 Final   • Hemoglobin 01/04/2023 9.7 (L)  12.0 - 15.9 g/dL Final   • Hematocrit 01/04/2023 29.5 (L)  34.0 - 46.6 % Final   • MCV 01/04/2023 96.1  79.0 - 97.0 fL Final   • MCH 01/04/2023 31.6  26.6 - 33.0 pg Final   • MCHC 01/04/2023 32.9  31.5 - 35.7 g/dL Final   • RDW 01/04/2023 15.1  12.3 - 15.4 % Final   • RDW-SD 01/04/2023 52.1  37.0 - 54.0 fl Final   • MPV 01/04/2023 11.5  6.0 - 12.0 fL Final   • Platelets 01/04/2023 266  140 - 450 10*3/mm3 Final   • Neutrophil % 01/04/2023 58.9  42.7 - 76.0 % Final   • Lymphocyte % 01/04/2023 8.9 (L)  19.6 - 45.3 % Final   • Monocyte % 01/04/2023 9.5  5.0 - 12.0 % Final   • Eosinophil % 01/04/2023 0.2 (L)  0.3 - 6.2 % Final   • Basophil % 01/04/2023 0.1  0.0 - 1.5 % Final   • Immature Grans % 01/04/2023 22.4 (H)  0.0 - 0.5 % Final   • Neutrophils, Absolute 01/04/2023 17.72 (H)  1.70 - 7.00 10*3/mm3 Final   • Lymphocytes, Absolute 01/04/2023 2.69  0.70 - 3.10 10*3/mm3 Final   • Monocytes, Absolute 01/04/2023 2.85 (H)  0.10 - 0.90 10*3/mm3 Final   • Eosinophils, Absolute 01/04/2023 0.05  0.00 - 0.40 10*3/mm3 Final   • Basophils, Absolute 01/04/2023 0.04  0.00 - 0.20 10*3/mm3 Final   • Immature Grans, Absolute 01/04/2023 6.75 (H)  0.00 - 0.05 10*3/mm3 Final   • nRBC 01/04/2023 0.1  0.0 - 0.2 /100 WBC Final   Infusion on 12/27/2022   Component Date Value Ref Range Status   • Glucose 12/27/2022 133 (H)  74 - 124 mg/dL Final   • BUN 12/27/2022 24 (H)  6 - 20 mg/dL Final   • Creatinine 12/27/2022 0.74  0.60 - 1.10 mg/dL Final   • Sodium 12/27/2022 136  134 - 145 mmol/L Final   • Potassium 12/27/2022 4.8 (H)  3.5 - 4.7 mmol/L Final   • Chloride 12/27/2022 99  98 - 107 mmol/L Final   • CO2 12/27/2022 24.5  22.0 - 29.0 mmol/L Final   • Calcium 12/27/2022 10.0  8.5 - 10.2 mg/dL Final   • Total Protein 12/27/2022 7.0  6.3 - 8.0 g/dL Final   • Albumin 12/27/2022 4.3  3.5 - 5.2 g/dL Final   • ALT (SGPT) 12/27/2022  24  0 - 33 U/L Final   • AST (SGOT) 12/27/2022 19  0 - 32 U/L Final   • Alkaline Phosphatase 12/27/2022 62  38 - 116 U/L Final   • Total Bilirubin 12/27/2022 0.2  0.2 - 1.2 mg/dL Final   • Globulin 12/27/2022 2.7  1.8 - 3.5 gm/dL Final   • A/G Ratio 12/27/2022 1.6  1.1 - 2.4 g/dL Final   • BUN/Creatinine Ratio 12/27/2022 32.4 (H)  7.3 - 30.0 Final   • Anion Gap 12/27/2022 12.5  5.0 - 15.0 mmol/L Final   • eGFR 12/27/2022 93.3  >60.0 mL/min/1.73 Final    National Kidney Foundation and American Society of Nephrology (ASN) Task Force recommended calculation based on the Chronic Kidney Disease Epidemiology Collaboration (CKD-EPI) equation refit without adjustment for race.   • WBC 12/27/2022 17.92 (H)  3.40 - 10.80 10*3/mm3 Final   • RBC 12/27/2022 3.16 (L)  3.77 - 5.28 10*6/mm3 Final   • Hemoglobin 12/27/2022 9.9 (L)  12.0 - 15.9 g/dL Final   • Hematocrit 12/27/2022 30.2 (L)  34.0 - 46.6 % Final   • MCV 12/27/2022 95.6  79.0 - 97.0 fL Final   • MCH 12/27/2022 31.3  26.6 - 33.0 pg Final   • MCHC 12/27/2022 32.8  31.5 - 35.7 g/dL Final   • RDW 12/27/2022 15.6 (H)  12.3 - 15.4 % Final   • RDW-SD 12/27/2022 53.7  37.0 - 54.0 fl Final   • MPV 12/27/2022 10.9  6.0 - 12.0 fL Final   • Platelets 12/27/2022 299  140 - 450 10*3/mm3 Final   • Neutrophil % 12/27/2022 85.7 (H)  42.7 - 76.0 % Final   • Lymphocyte % 12/27/2022 8.0 (L)  19.6 - 45.3 % Final   • Monocyte % 12/27/2022 5.4  5.0 - 12.0 % Final   • Eosinophil % 12/27/2022 0.0 (L)  0.3 - 6.2 % Final   • Basophil % 12/27/2022 0.1  0.0 - 1.5 % Final   • Immature Grans % 12/27/2022 0.8 (H)  0.0 - 0.5 % Final   • Neutrophils, Absolute 12/27/2022 15.35 (H)  1.70 - 7.00 10*3/mm3 Final   • Lymphocytes, Absolute 12/27/2022 1.44  0.70 - 3.10 10*3/mm3 Final   • Monocytes, Absolute 12/27/2022 0.97 (H)  0.10 - 0.90 10*3/mm3 Final   • Eosinophils, Absolute 12/27/2022 0.00  0.00 - 0.40 10*3/mm3 Final   • Basophils, Absolute 12/27/2022 0.02  0.00 - 0.20 10*3/mm3 Final   • Immature Grans,  Absolute 12/27/2022 0.14 (H)  0.00 - 0.05 10*3/mm3 Final   • nRBC 12/27/2022 0.0  0.0 - 0.2 /100 WBC Final        No radiology results for the last 30 days.   Assessment & Plan       *Left breast invasive ductal carcinoma  · Grade 3, ER/LA negative, HER2 1+ on immunohistochemistry, Ki-67 58%  · Tumor measures 8 mm on mammogram and 5 mm on ultrasound  · On the biopsy the tumor measures 0.7 cm.  · MRI of the breast confirmed the tumor to be 0.8cm   · Status post bilateral mastectomy on 10/18/2022.  · Pathology consistent with invasive ductal carcinoma of the left breast measuring 8 mm, triple negative, grade 3, Ki-67 65%  · pT1b N0 M0, stage Ia clinical, stage Ib prognostic  · We discussed the benefit of chemotherapy and tumors which are over 5 mm and triple negative.  · Given that the tumor is under 1 cm we will proceed with Taxotere and cyclophosphamide.  · Cycle 1 TC administered 11/14/2022  · 12/5/2022 scheduled for cycle 2 TC.  Tolerating chemotherapy well.  · Cycle 3 chemotherapy  12/27/2022.  Given 1 day late due to Christmas holiday  · 1/16/2023 proceed with cycle 4 TC plus Udenyca. Continues to tolerate treatment well.  Return in 1 week for 1L IV normal saline.     *Pathogenic PAL B2 mutation  · We discussed the autosomal dominant inheritance of the PAL B2 mutation.  · Her daughter who is accompanying her to the visit is scheduled to undergo testing today.  · We also discussed the penetrance and the screening recommendations.  · She thinks that her paternal grandmother might have had pancreatic cancer but unsure.  · We discussed the increased risk of pancreatic cancer associated PAL B2 mutation.  Discussed screening recommendations of annual MRCP or EUS for pancreatic cancer starting at the age of 50.  · Given that there is no clear first-degree relative with pancreatic cancer she would not meet guidelines recommendation for screening for pancreatic cancer.  · We discussed the signs and symptoms of pancreatic  cancer including nausea, vomiting, epigastric pain and discomfort, abdominal distention, weight loss  · She does not have a family history of ovarian cancer hence there is no clear indication for risk reducing salpingo-oophorectomy.  Recommend that she discuss this further with her GYN.    *Hypertension-blood pressure 124/78.    *Obesity-BMI 35.8    *Insomnia  · Secondary to steroids  · Dexamethasone dose has been decreased  · Continue with current dose    *Anemia  · Asymptomatic  · Hemoglobin 10 today.    PLAN:   · Proceed with cycle #4 TC today.  · 1 L IV normal saline in 1 week   · Return every 6 weeks for port flush.  · Return in 9 weeks for MD follow up.    The patient is on high risk medication that requires close monitoring for toxicity.    Nataliia Smith, APRN  01/16/23

## 2023-01-16 ENCOUNTER — OFFICE VISIT (OUTPATIENT)
Dept: ONCOLOGY | Facility: CLINIC | Age: 58
End: 2023-01-16
Payer: COMMERCIAL

## 2023-01-16 ENCOUNTER — INFUSION (OUTPATIENT)
Dept: ONCOLOGY | Facility: HOSPITAL | Age: 58
End: 2023-01-16
Payer: COMMERCIAL

## 2023-01-16 VITALS
TEMPERATURE: 97.1 F | SYSTOLIC BLOOD PRESSURE: 124 MMHG | HEIGHT: 64 IN | HEART RATE: 86 BPM | RESPIRATION RATE: 18 BRPM | WEIGHT: 213.3 LBS | BODY MASS INDEX: 36.41 KG/M2 | OXYGEN SATURATION: 98 % | DIASTOLIC BLOOD PRESSURE: 78 MMHG

## 2023-01-16 DIAGNOSIS — Z79.899 HIGH RISK MEDICATION USE: ICD-10-CM

## 2023-01-16 DIAGNOSIS — C50.212 MALIGNANT NEOPLASM OF UPPER-INNER QUADRANT OF LEFT BREAST IN FEMALE, ESTROGEN RECEPTOR NEGATIVE: Primary | ICD-10-CM

## 2023-01-16 DIAGNOSIS — C50.212 MALIGNANT NEOPLASM OF UPPER-INNER QUADRANT OF LEFT BREAST IN FEMALE, ESTROGEN RECEPTOR NEGATIVE: ICD-10-CM

## 2023-01-16 DIAGNOSIS — Z17.1 MALIGNANT NEOPLASM OF UPPER-INNER QUADRANT OF LEFT BREAST IN FEMALE, ESTROGEN RECEPTOR NEGATIVE: Primary | ICD-10-CM

## 2023-01-16 DIAGNOSIS — Z17.1 MALIGNANT NEOPLASM OF UPPER-INNER QUADRANT OF LEFT BREAST IN FEMALE, ESTROGEN RECEPTOR NEGATIVE: ICD-10-CM

## 2023-01-16 LAB
ALBUMIN SERPL-MCNC: 4.2 G/DL (ref 3.5–5.2)
ALBUMIN/GLOB SERPL: 1.6 G/DL (ref 1.1–2.4)
ALP SERPL-CCNC: 56 U/L (ref 38–116)
ALT SERPL W P-5'-P-CCNC: 15 U/L (ref 0–33)
ANION GAP SERPL CALCULATED.3IONS-SCNC: 12.4 MMOL/L (ref 5–15)
AST SERPL-CCNC: 17 U/L (ref 0–32)
BASOPHILS # BLD AUTO: 0.02 10*3/MM3 (ref 0–0.2)
BASOPHILS NFR BLD AUTO: 0.1 % (ref 0–1.5)
BILIRUB SERPL-MCNC: 0.2 MG/DL (ref 0.2–1.2)
BUN SERPL-MCNC: 17 MG/DL (ref 6–20)
BUN/CREAT SERPL: 26.6 (ref 7.3–30)
CALCIUM SPEC-SCNC: 9.6 MG/DL (ref 8.5–10.2)
CHLORIDE SERPL-SCNC: 104 MMOL/L (ref 98–107)
CO2 SERPL-SCNC: 22.6 MMOL/L (ref 22–29)
CREAT SERPL-MCNC: 0.64 MG/DL (ref 0.6–1.1)
DEPRECATED RDW RBC AUTO: 57.3 FL (ref 37–54)
EGFRCR SERPLBLD CKD-EPI 2021: 101.9 ML/MIN/1.73
EOSINOPHIL # BLD AUTO: 0 10*3/MM3 (ref 0–0.4)
EOSINOPHIL NFR BLD AUTO: 0 % (ref 0.3–6.2)
ERYTHROCYTE [DISTWIDTH] IN BLOOD BY AUTOMATED COUNT: 16.3 % (ref 12.3–15.4)
GLOBULIN UR ELPH-MCNC: 2.6 GM/DL (ref 1.8–3.5)
GLUCOSE SERPL-MCNC: 140 MG/DL (ref 74–124)
HCT VFR BLD AUTO: 30.1 % (ref 34–46.6)
HGB BLD-MCNC: 10 G/DL (ref 12–15.9)
IMM GRANULOCYTES # BLD AUTO: 0.17 10*3/MM3 (ref 0–0.05)
IMM GRANULOCYTES NFR BLD AUTO: 0.9 % (ref 0–0.5)
LYMPHOCYTES # BLD AUTO: 1.59 10*3/MM3 (ref 0.7–3.1)
LYMPHOCYTES NFR BLD AUTO: 8.6 % (ref 19.6–45.3)
MCH RBC QN AUTO: 32.6 PG (ref 26.6–33)
MCHC RBC AUTO-ENTMCNC: 33.2 G/DL (ref 31.5–35.7)
MCV RBC AUTO: 98 FL (ref 79–97)
MONOCYTES # BLD AUTO: 1.3 10*3/MM3 (ref 0.1–0.9)
MONOCYTES NFR BLD AUTO: 7 % (ref 5–12)
NEUTROPHILS NFR BLD AUTO: 15.51 10*3/MM3 (ref 1.7–7)
NEUTROPHILS NFR BLD AUTO: 83.4 % (ref 42.7–76)
NRBC BLD AUTO-RTO: 0.1 /100 WBC (ref 0–0.2)
PLATELET # BLD AUTO: 257 10*3/MM3 (ref 140–450)
PMV BLD AUTO: 10.7 FL (ref 6–12)
POTASSIUM SERPL-SCNC: 4.3 MMOL/L (ref 3.5–4.7)
PROT SERPL-MCNC: 6.8 G/DL (ref 6.3–8)
RBC # BLD AUTO: 3.07 10*6/MM3 (ref 3.77–5.28)
SODIUM SERPL-SCNC: 139 MMOL/L (ref 134–145)
WBC NRBC COR # BLD: 18.59 10*3/MM3 (ref 3.4–10.8)

## 2023-01-16 PROCEDURE — 99214 OFFICE O/P EST MOD 30 MIN: CPT | Performed by: NURSE PRACTITIONER

## 2023-01-16 PROCEDURE — 96417 CHEMO IV INFUS EACH ADDL SEQ: CPT

## 2023-01-16 PROCEDURE — 25010000002 CYCLOPHOSPHAMIDE 1 GM/5ML SOLUTION 5 ML VIAL: Performed by: NURSE PRACTITIONER

## 2023-01-16 PROCEDURE — 25010000002 PALONOSETRON PER 25 MCG: Performed by: NURSE PRACTITIONER

## 2023-01-16 PROCEDURE — 96375 TX/PRO/DX INJ NEW DRUG ADDON: CPT

## 2023-01-16 PROCEDURE — 25010000002 DIPHENHYDRAMINE PER 50 MG: Performed by: NURSE PRACTITIONER

## 2023-01-16 PROCEDURE — 96413 CHEMO IV INFUSION 1 HR: CPT

## 2023-01-16 PROCEDURE — 85025 COMPLETE CBC W/AUTO DIFF WBC: CPT

## 2023-01-16 PROCEDURE — 25010000002 DOCETAXEL 20 MG/ML SOLUTION 8 ML VIAL: Performed by: NURSE PRACTITIONER

## 2023-01-16 PROCEDURE — 80053 COMPREHEN METABOLIC PANEL: CPT

## 2023-01-16 RX ORDER — PALONOSETRON 0.05 MG/ML
0.25 INJECTION, SOLUTION INTRAVENOUS ONCE
Status: COMPLETED | OUTPATIENT
Start: 2023-01-16 | End: 2023-01-16

## 2023-01-16 RX ORDER — SODIUM CHLORIDE 9 MG/ML
250 INJECTION, SOLUTION INTRAVENOUS ONCE
Status: COMPLETED | OUTPATIENT
Start: 2023-01-16 | End: 2023-01-16

## 2023-01-16 RX ORDER — PALONOSETRON 0.05 MG/ML
0.25 INJECTION, SOLUTION INTRAVENOUS ONCE
Status: CANCELLED | OUTPATIENT
Start: 2023-01-16

## 2023-01-16 RX ORDER — DIPHENHYDRAMINE HYDROCHLORIDE 50 MG/ML
50 INJECTION INTRAMUSCULAR; INTRAVENOUS AS NEEDED
Status: CANCELLED | OUTPATIENT
Start: 2023-01-16

## 2023-01-16 RX ORDER — FAMOTIDINE 10 MG/ML
20 INJECTION, SOLUTION INTRAVENOUS ONCE
Status: CANCELLED | OUTPATIENT
Start: 2023-01-16

## 2023-01-16 RX ORDER — SODIUM CHLORIDE 9 MG/ML
250 INJECTION, SOLUTION INTRAVENOUS ONCE
Status: CANCELLED | OUTPATIENT
Start: 2023-01-16

## 2023-01-16 RX ORDER — FAMOTIDINE 10 MG/ML
20 INJECTION, SOLUTION INTRAVENOUS ONCE
Status: COMPLETED | OUTPATIENT
Start: 2023-01-16 | End: 2023-01-16

## 2023-01-16 RX ORDER — FAMOTIDINE 10 MG/ML
20 INJECTION, SOLUTION INTRAVENOUS AS NEEDED
Status: CANCELLED | OUTPATIENT
Start: 2023-01-16

## 2023-01-16 RX ADMIN — SODIUM CHLORIDE 250 ML: 9 INJECTION, SOLUTION INTRAVENOUS at 08:51

## 2023-01-16 RX ADMIN — FAMOTIDINE 20 MG: 10 INJECTION INTRAVENOUS at 08:52

## 2023-01-16 RX ADMIN — PALONOSETRON 0.25 MG: 0.05 INJECTION, SOLUTION INTRAVENOUS at 08:51

## 2023-01-16 RX ADMIN — CYCLOPHOSPHAMIDE 1200 MG: 200 INJECTION, SOLUTION INTRAVENOUS at 10:50

## 2023-01-16 RX ADMIN — DIPHENHYDRAMINE HYDROCHLORIDE 25 MG: 50 INJECTION, SOLUTION INTRAMUSCULAR; INTRAVENOUS at 08:53

## 2023-01-16 RX ADMIN — DOCETAXEL 150 MG: 20 INJECTION, SOLUTION, CONCENTRATE INTRAVENOUS at 09:43

## 2023-01-17 ENCOUNTER — PATIENT OUTREACH (OUTPATIENT)
Dept: OTHER | Facility: HOSPITAL | Age: 58
End: 2023-01-17
Payer: COMMERCIAL

## 2023-01-17 ENCOUNTER — INFUSION (OUTPATIENT)
Dept: ONCOLOGY | Facility: HOSPITAL | Age: 58
End: 2023-01-17
Payer: COMMERCIAL

## 2023-01-17 DIAGNOSIS — C50.212 MALIGNANT NEOPLASM OF UPPER-INNER QUADRANT OF LEFT BREAST IN FEMALE, ESTROGEN RECEPTOR NEGATIVE: Primary | ICD-10-CM

## 2023-01-17 DIAGNOSIS — Z17.1 MALIGNANT NEOPLASM OF UPPER-INNER QUADRANT OF LEFT BREAST IN FEMALE, ESTROGEN RECEPTOR NEGATIVE: Primary | ICD-10-CM

## 2023-01-17 PROCEDURE — 25010000002 PEGFILGRASTIM-CBQV 6 MG/0.6ML SOLUTION PREFILLED SYRINGE: Performed by: INTERNAL MEDICINE

## 2023-01-17 PROCEDURE — 96372 THER/PROPH/DIAG INJ SC/IM: CPT

## 2023-01-17 RX ADMIN — PEGFILGRASTIM-CBQV 6 MG: 6 INJECTION, SOLUTION SUBCUTANEOUS at 12:58

## 2023-01-17 NOTE — PROGRESS NOTES
Called Ms. Kiser to see how she was doing. She stated she just finished her last chemo treatment yesterday and is doing well. She will have a follow up appt with Dr. Holley Feb 2nd. She was interested in support group options and we discussed [employer] again. She stated she would look into that soon. Otherwise she has no needs at this time. She was thankful for the call and will reach out if any questions or needs arise.

## 2023-01-23 ENCOUNTER — INFUSION (OUTPATIENT)
Dept: ONCOLOGY | Facility: HOSPITAL | Age: 58
End: 2023-01-23
Payer: COMMERCIAL

## 2023-01-23 VITALS
HEART RATE: 91 BPM | WEIGHT: 203 LBS | SYSTOLIC BLOOD PRESSURE: 127 MMHG | DIASTOLIC BLOOD PRESSURE: 76 MMHG | OXYGEN SATURATION: 97 % | RESPIRATION RATE: 16 BRPM | BODY MASS INDEX: 34.83 KG/M2 | TEMPERATURE: 97.1 F

## 2023-01-23 DIAGNOSIS — Z17.1 MALIGNANT NEOPLASM OF UPPER-INNER QUADRANT OF LEFT BREAST IN FEMALE, ESTROGEN RECEPTOR NEGATIVE: Primary | ICD-10-CM

## 2023-01-23 DIAGNOSIS — C50.212 MALIGNANT NEOPLASM OF UPPER-INNER QUADRANT OF LEFT BREAST IN FEMALE, ESTROGEN RECEPTOR NEGATIVE: Primary | ICD-10-CM

## 2023-01-23 LAB
ALBUMIN SERPL-MCNC: 3.7 G/DL (ref 3.5–5.2)
ALBUMIN/GLOB SERPL: 1.5 G/DL (ref 1.1–2.4)
ALP SERPL-CCNC: 64 U/L (ref 38–116)
ALT SERPL W P-5'-P-CCNC: 8 U/L (ref 0–33)
ANION GAP SERPL CALCULATED.3IONS-SCNC: 9.4 MMOL/L (ref 5–15)
AST SERPL-CCNC: 17 U/L (ref 0–32)
BASOPHILS # BLD AUTO: 0.07 10*3/MM3 (ref 0–0.2)
BASOPHILS NFR BLD AUTO: 0.3 % (ref 0–1.5)
BILIRUB SERPL-MCNC: 0.3 MG/DL (ref 0.2–1.2)
BUN SERPL-MCNC: 12 MG/DL (ref 6–20)
BUN/CREAT SERPL: 16.4 (ref 7.3–30)
CALCIUM SPEC-SCNC: 9.1 MG/DL (ref 8.5–10.2)
CHLORIDE SERPL-SCNC: 103 MMOL/L (ref 98–107)
CO2 SERPL-SCNC: 24.6 MMOL/L (ref 22–29)
CREAT SERPL-MCNC: 0.73 MG/DL (ref 0.6–1.1)
DEPRECATED RDW RBC AUTO: 53.1 FL (ref 37–54)
EGFRCR SERPLBLD CKD-EPI 2021: 94.9 ML/MIN/1.73
EOSINOPHIL # BLD AUTO: 0.13 10*3/MM3 (ref 0–0.4)
EOSINOPHIL NFR BLD AUTO: 0.5 % (ref 0.3–6.2)
ERYTHROCYTE [DISTWIDTH] IN BLOOD BY AUTOMATED COUNT: 15.5 % (ref 12.3–15.4)
GLOBULIN UR ELPH-MCNC: 2.5 GM/DL (ref 1.8–3.5)
GLUCOSE SERPL-MCNC: 122 MG/DL (ref 74–124)
HCT VFR BLD AUTO: 29.4 % (ref 34–46.6)
HGB BLD-MCNC: 9.8 G/DL (ref 12–15.9)
IMM GRANULOCYTES # BLD AUTO: 4.75 10*3/MM3 (ref 0–0.05)
IMM GRANULOCYTES NFR BLD AUTO: 19.5 % (ref 0–0.5)
LYMPHOCYTES # BLD AUTO: 1.84 10*3/MM3 (ref 0.7–3.1)
LYMPHOCYTES NFR BLD AUTO: 7.5 % (ref 19.6–45.3)
MCH RBC QN AUTO: 32 PG (ref 26.6–33)
MCHC RBC AUTO-ENTMCNC: 33.3 G/DL (ref 31.5–35.7)
MCV RBC AUTO: 96.1 FL (ref 79–97)
MONOCYTES # BLD AUTO: 3.68 10*3/MM3 (ref 0.1–0.9)
MONOCYTES NFR BLD AUTO: 15.1 % (ref 5–12)
NEUTROPHILS NFR BLD AUTO: 13.95 10*3/MM3 (ref 1.7–7)
NEUTROPHILS NFR BLD AUTO: 57.1 % (ref 42.7–76)
NRBC BLD AUTO-RTO: 0.1 /100 WBC (ref 0–0.2)
PLATELET # BLD AUTO: 228 10*3/MM3 (ref 140–450)
PMV BLD AUTO: 11.4 FL (ref 6–12)
POTASSIUM SERPL-SCNC: 3.7 MMOL/L (ref 3.5–4.7)
PROT SERPL-MCNC: 6.2 G/DL (ref 6.3–8)
RBC # BLD AUTO: 3.06 10*6/MM3 (ref 3.77–5.28)
SODIUM SERPL-SCNC: 137 MMOL/L (ref 134–145)
WBC NRBC COR # BLD: 24.42 10*3/MM3 (ref 3.4–10.8)

## 2023-01-23 PROCEDURE — 85025 COMPLETE CBC W/AUTO DIFF WBC: CPT

## 2023-01-23 PROCEDURE — 96360 HYDRATION IV INFUSION INIT: CPT

## 2023-01-23 PROCEDURE — 80053 COMPREHEN METABOLIC PANEL: CPT

## 2023-01-23 RX ORDER — SODIUM CHLORIDE 9 MG/ML
1000 INJECTION, SOLUTION INTRAVENOUS ONCE
Status: COMPLETED | OUTPATIENT
Start: 2023-01-23 | End: 2023-01-23

## 2023-01-23 RX ADMIN — SODIUM CHLORIDE 1000 ML: 9 INJECTION, SOLUTION INTRAVENOUS at 13:29

## 2023-01-25 ENCOUNTER — TELEPHONE (OUTPATIENT)
Dept: ONCOLOGY | Facility: CLINIC | Age: 58
End: 2023-01-25
Payer: COMMERCIAL

## 2023-01-25 NOTE — TELEPHONE ENCOUNTER
Caller: Matteo Kiser    Relationship: Self    Best call back number: [phone]    Who are you requesting to speak with (clinical staff, provider,  specific staff member): CLINICAL     What was the call regarding: MATTEO IS NEEDING A WORK STATEMENT THAT SAYS SHE CAN RETURN TO WORK WITHOUT RESTRICTIONS, ON [employer] LETTERHEAD  AND WILL RETURN TO WORK ON Monday       FAX TO [employer]IT[employer] GUEVARA # [phone]    Do you require a callback: WHEN FAXED

## 2023-01-26 ENCOUNTER — DOCUMENTATION (OUTPATIENT)
Dept: ONCOLOGY | Facility: HOSPITAL | Age: 58
End: 2023-01-26
Payer: COMMERCIAL

## 2023-01-27 NOTE — TELEPHONE ENCOUNTER
PT CALLED WANTED TO GIVE April THE FAX NUMBER TO FAX OVER THE NOTE TO HER WORK     FAX# [phone]

## 2023-01-30 DIAGNOSIS — Z17.1 MALIGNANT NEOPLASM OF UPPER-INNER QUADRANT OF LEFT BREAST IN FEMALE, ESTROGEN RECEPTOR NEGATIVE: ICD-10-CM

## 2023-01-30 DIAGNOSIS — C50.212 MALIGNANT NEOPLASM OF UPPER-INNER QUADRANT OF LEFT BREAST IN FEMALE, ESTROGEN RECEPTOR NEGATIVE: ICD-10-CM

## 2023-01-30 RX ORDER — PANTOPRAZOLE SODIUM 40 MG/1
TABLET, DELAYED RELEASE ORAL
Qty: 90 TABLET | Refills: 1 | Status: SHIPPED | OUTPATIENT
Start: 2023-01-30

## 2023-02-02 ENCOUNTER — DOCUMENTATION (OUTPATIENT)
Dept: SURGERY | Facility: CLINIC | Age: 58
End: 2023-02-02
Payer: COMMERCIAL

## 2023-02-02 ENCOUNTER — OFFICE VISIT (OUTPATIENT)
Dept: SURGERY | Facility: CLINIC | Age: 58
End: 2023-02-02
Payer: COMMERCIAL

## 2023-02-02 VITALS
DIASTOLIC BLOOD PRESSURE: 82 MMHG | RESPIRATION RATE: 17 BRPM | WEIGHT: 203 LBS | OXYGEN SATURATION: 100 % | BODY MASS INDEX: 34.66 KG/M2 | HEART RATE: 83 BPM | SYSTOLIC BLOOD PRESSURE: 130 MMHG | HEIGHT: 64 IN

## 2023-02-02 DIAGNOSIS — Z17.1 MALIGNANT NEOPLASM OF UPPER-INNER QUADRANT OF LEFT BREAST IN FEMALE, ESTROGEN RECEPTOR NEGATIVE: Primary | ICD-10-CM

## 2023-02-02 DIAGNOSIS — C50.212 MALIGNANT NEOPLASM OF UPPER-INNER QUADRANT OF LEFT BREAST IN FEMALE, ESTROGEN RECEPTOR NEGATIVE: Primary | ICD-10-CM

## 2023-02-02 PROCEDURE — 99212 OFFICE O/P EST SF 10 MIN: CPT | Performed by: SURGERY

## 2023-02-02 NOTE — PROGRESS NOTES
[facility]     Referring Provider: Fatoumata Hidalgo MD     Chief complaint: Routine follow up breast cancer      HPI:   9/8/2022:  Ms. Bebe Kiser is a 58 yo woman, seen at the request of Dr. Fatoumata Hidalgo, for a new diagnosis of left breast cancer. This was initially detected as an imaging abnormality on routine screening. Her most recent mammogram prior to this year was in 2016. Her work-up is detailed in the oncologic history below. Prior to the biopsy, she denies any breast lumps, pain, skin changes, or nipple discharge. She denies any prior history of abnormal mammograms or breast biopsies. She denies any family history of breast or ovarian cancer.    9/28/2022, Telephone Encounter:  I called Ms. Kiser to let her know that the MRI did not show any evidence of lymph node disease, so I think we are still okay with a surgery first approach.  However her genetic testing did show a PALB2 mutation conferring an increased risk of a second primary breast cancer.  In this situation I would recommend a bilateral mastectomy.  I will see her back next week to review the new surgical plan.  I have also let Dr. Agustin know.    10/6/2022:  She returns today to discuss a change in the surgery plan due to her newly diagnosed PALB2 mutation.      11/2/2022:  She underwent bilateral mastectomy, left SLNB, and port placement on 10/18/22. See surgery & pathology details below in oncologic history. She has been recovering well and has no complaints. Drain outputs have been less than 20 mL on each side for the past few days.     2/2/2023, Interval History:  She returns today for scheduled follow-up.  She saw PT once postoperatively and her bioimpedance score was within normal limits.  She completed TC x4 on 1/16/2023.  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  She is hoping to have her reconstruction with Dr. Agustin in May.  She denies any new breast related complaints.      Oncology/Hematology  History   Malignant neoplasm of upper-inner quadrant of left breast in female, estrogen receptor negative (HCC)   1/5/2016 Imaging    Bilateral Diagnostic [facility]  The parenchyma of both breasts is largely fatty-replaced. I see no masses, areas of architectural distortion or skin thickening. There is no evidence for axillary lymphadenopathy or nipple retraction.  BI-RADS 1: Negative.     8/3/2022 Initial Diagnosis    Malignant neoplasm of upper-inner quadrant of left breast in female, estrogen receptor negative (HCC)     8/4/2022 Imaging    Screening MMG with Gautam ([facility]):  The breasts are almost entirely fatty.   There is a new small, oval mass measuring 8 mm with indistinct margins and associated fine-linear calcifications seen in the middle one-third region of the left breast at 9 o'clock.  In the right breast, no suspicious masses, significant calcifications or other abnormalities are seen.  BI-RADS 0: Incomplete.      8/19/2022 Imaging    Left Diagnostic MMG with Gautam & Left Breast US ([facility]):   MMG:  On the present examination, there is a small, irregular mass measuring 6 mm with indistinct margins and associated fine-linear calcifications in the middle one-third 9:30 o'clock region of the left breast located 10 centimeters from the nipple.   US:   Ultrasound demonstrates an irregular heterogeneous solid mass with indistinct margins measuring 5 x 5 x 4 mm in the middle one-third 9:30 o'clock region of the left breast located 10 centimeters from the nipple.   BI-RADS 4C: Suspicious.      8/26/2022 Biopsy    Left Breast, US-Guided Biopsy ([facility]):    Left Breast, 9:30, Core Biopsies:   Invasive Mammary Carcinoma, No special type (Ductal)    Histologic Grade ([place]):     Glandular/Tubular Score: 3     Nuclear Score: 3     Mitotic Score: 2     Overall Grade: 3 (High Grade)    Size: 0.7 cm    Lymphovascular Invasion: Not Identified     Microcalcifications: Microcalcifications in Invasive Carcinoma.   No DCIS  is Identified.   -Clip projects up to 6 mm away from residual calcifications on postbiopsy mammogram.    ER negative (0%)  WA negative (0%)  Her2 negative (IHC 1+)  Ki-67 58.07%     9/8/2022 Genetic Testing    Breast & Gyn Cancers Panel (36 genes):    PALB2 pathogenic mutation     9/27/2022 Imaging    Bilateral Breast MRI ([facility]):  In the middle third upper inner quadrant of the left breast centered at the 10 o'clock position on the order of 7 cm from nipple there is an irregular enhancing mass that measures on the order of 0.7 cm in anterior to posterior dimension, 0.6 cm in the superior to inferior dimension and 0.8 cm in the medial to lateral dimension. A focal signal void is seen at the posterior margin of the lesion. This represents the biopsy-proven site of malignancy with the adjacent mini cork-shaped metallic clip. Below threshold nonspecific thin linear enhancement that measures up to 1 cm in length is seen at the posterior margin of the biopsy clip. No mammographic abnormality in this region is appreciated.  Located on the order of 1 cm anterior to the biopsy-proven malignancy at the 10 o'clock axis is a 0.6 cm ill-defined focus of enhancement. Mammographically there are faint microcalcifications that are seen in this region on the mammogram dated 08/19/2022, these can be seen on the spot magnification image and to a lesser degree on the mammogram dated 8/4/2022.  No other areas of suspicious enhancement or morphology are seen in the left breast. I see no evidence for abnormal left breast skin, nipple or chest wall enhancement and there is no evidence for left axillary or internal mammary chain adenopathy.   There are no areas of suspicious enhancement or morphology in the right breast. I see no evidence for abnormal right breast skin, nipple or chest wall enhancement and there is no evidence for right axillary or internal mammary chain adenopathy.  BI-RADS 4: Suspicious.     10/18/2022 Surgery    Port  placement, bilateral skin-sparing mastectomy and left sentinel lymph node biopsy-    1. Right Breast, Simple Skin-Sparing Mastectomy (980 Grams):               A. Benign breast parenchyma with no significant histopathologic changes.               B. Unremarkable skin and nipple.                 2. Left Axillary [place] Lymph Node #1, Hot and Blue, Count 1600, Biopsy (FS):               A. One lymph node, negative for carcinoma (0/1).                 3. Left Axillary [place] Lymph Node #2, Hot not Blue, Count 500, Biopsy (FS):               A. Two lymph nodes, negative for carcinoma (0/2).     4. Left Breast, Simple Skin-Sparing Mastectomy (1,170 Grams):               A. INVASIVE DUCTAL CARCINOMA, Poorly differentiated; [place] Histologic Grade III/III       (tubule score = 3, nuclear score = 3, mitoses score = 3):                            1. Tumor size: 8 mm (measured on slide 4D).                            2. Margins are negative for invasive carcinoma; Closest distance: Invasive carcinoma is       present 14 mm from the anterior surface.                            3. Negative for lymphovascular space invasion.               B. Clip and biopsy site changes are present and adjacent to invasive carcinoma.               C. Unremarkable skin and nipple.               D. See Synoptic Report (to include parts 2-3) and Comment.    ER negative (<1%)  MA negative (<1%)  Her2 negative (IHC 1+)  Ki-67 65%     11/14/2022 -  Chemotherapy    OP BREAST TC DOCEtaxel / Cyclophosphamide         Review of Systems:  See interval history.       Medications:    Current Outpatient Medications:   •  carvedilol (COREG) 25 MG tablet, TAKE 1 TABLET BY MOUTH TWICE A DAY WITH MEALS (Patient taking differently: Take 25 mg by mouth 2 (Two) Times a Day With Meals.), Disp: 180 tablet, Rfl: 3  •  dexamethasone (DECADRON) 4 MG tablet, Take 2 tablets oral twice a day for 3 consecutive days beginning the day before chemotherapy and continue  for 6 doses., Disp: 48 tablet, Rfl: 0  •  gabapentin (NEURONTIN) 100 MG capsule, Take 1 capsule by mouth 3 (Three) Times a Day for 14 days., Disp: 42 capsule, Rfl: 0  •  lidocaine-prilocaine (EMLA) 2.5-2.5 % cream, Apply 1 application topically to the appropriate area as directed As Needed for Mild Pain., Disp: 30 g, Rfl: 1  •  ondansetron (ZOFRAN) 8 MG tablet, Take 1 tablet by mouth 3 (Three) Times a Day As Needed for Nausea or Vomiting., Disp: 30 tablet, Rfl: 5  •  pantoprazole (PROTONIX) 40 MG EC tablet, TAKE 1 TABLET BY MOUTH EVERY DAY, Disp: 90 tablet, Rfl: 1  •  spironolactone (ALDACTONE) 25 MG tablet, TAKE 1 TABLET BY MOUTH EVERY DAY (Patient taking differently: Take 25 mg by mouth Daily.), Disp: 90 tablet, Rfl: 3  •  VITAMIN D PO, Take 1 tablet by mouth Daily., Disp: , Rfl:       Allergies:  No Known Allergies      Family History   Problem Relation Age of Onset   • Hypotension Mother    • Asthma Mother    • No Known Problems Father    • Hypertension Brother    • Breast cancer Neg Hx    • Ovarian cancer Neg Hx    • Uterine cancer Neg Hx    • Colon cancer Neg Hx    • Malig Hyperthermia Neg Hx        PHYSICAL EXAMINATION:   Vitals:    02/02/23 1029   BP: 130/82   Pulse: 83   Resp: 17   SpO2: 100%     ECOG 0 - Asymptomatic  General: NAD, well appearing  Psych: a&o x 3, normal mood and affect  Eyes: EOMI, no scleral icterus  ENMT: neck supple without masses or thyromegaly, mucus membranes moist  MSK: normal gait, normal ROM in bilateral shoulders  Lymph nodes: no cervical, supraclavicular or axillary lymphadenopathy  Breast:   Right: Sp mastectomy with redundant skin and well-healed incision.  No masses.  Left: Sp mastectomy with redundant skin and well-healed incision.  No masses.      Assessment:  58 y.o. F with a PALB2 mutation and a diagnosis of left breast cancer: High grade, triple negative, invasive ductal carcinoma. She underwent bilateral mastectomy, left SLNB, and port placement on 10/18/22, pT1bN0,  anatomic stage IA, prognostic stage IB.  She completed adjuvant TC x4 on 1/16/2023.    Plan:  -Will send back to Dr. Agustin to plan her delayed reconstruction.  Dr. Agustin to remove her port during the same surgery.  -Continue follow-up with Dr. Bernardo.  -Continue follow-up with lymphedema clinic  -Follow-up with me in 6 months for clinical exam.  -She was instructed to call sooner with any questions, concerns or changes on BSE.    Manisha Holley MD      CC:  MD Kevan Bonilla MD Avital Hahn, MD Ryan Wermeling, MD

## 2023-02-07 ENCOUNTER — INFUSION (OUTPATIENT)
Dept: ONCOLOGY | Facility: HOSPITAL | Age: 58
End: 2023-02-07
Payer: COMMERCIAL

## 2023-02-07 ENCOUNTER — OFFICE VISIT (OUTPATIENT)
Dept: OTHER | Facility: HOSPITAL | Age: 58
End: 2023-02-07
Payer: COMMERCIAL

## 2023-02-07 VITALS
HEART RATE: 86 BPM | TEMPERATURE: 97 F | WEIGHT: 208.4 LBS | SYSTOLIC BLOOD PRESSURE: 106 MMHG | RESPIRATION RATE: 18 BRPM | BODY MASS INDEX: 35.77 KG/M2 | OXYGEN SATURATION: 97 % | DIASTOLIC BLOOD PRESSURE: 70 MMHG

## 2023-02-07 DIAGNOSIS — Z45.2 ENCOUNTER FOR FITTING AND ADJUSTMENT OF VASCULAR CATHETER: Primary | ICD-10-CM

## 2023-02-07 DIAGNOSIS — Z17.1 MALIGNANT NEOPLASM OF UPPER-INNER QUADRANT OF LEFT BREAST IN FEMALE, ESTROGEN RECEPTOR NEGATIVE: Primary | ICD-10-CM

## 2023-02-07 DIAGNOSIS — C50.212 MALIGNANT NEOPLASM OF UPPER-INNER QUADRANT OF LEFT BREAST IN FEMALE, ESTROGEN RECEPTOR NEGATIVE: Primary | ICD-10-CM

## 2023-02-07 PROCEDURE — G0463 HOSPITAL OUTPT CLINIC VISIT: HCPCS | Performed by: NURSE PRACTITIONER

## 2023-02-07 PROCEDURE — 96523 IRRIG DRUG DELIVERY DEVICE: CPT

## 2023-02-07 PROCEDURE — 25010000002 HEPARIN LOCK FLUSH PER 10 UNITS: Performed by: INTERNAL MEDICINE

## 2023-02-07 PROCEDURE — 99215 OFFICE O/P EST HI 40 MIN: CPT | Performed by: NURSE PRACTITIONER

## 2023-02-07 RX ORDER — SODIUM CHLORIDE 0.9 % (FLUSH) 0.9 %
10 SYRINGE (ML) INJECTION AS NEEDED
Status: CANCELLED | OUTPATIENT
Start: 2023-02-07

## 2023-02-07 RX ORDER — SODIUM CHLORIDE 0.9 % (FLUSH) 0.9 %
10 SYRINGE (ML) INJECTION AS NEEDED
Status: DISCONTINUED | OUTPATIENT
Start: 2023-02-07 | End: 2023-02-07 | Stop reason: HOSPADM

## 2023-02-07 RX ORDER — HEPARIN SODIUM (PORCINE) LOCK FLUSH IV SOLN 100 UNIT/ML 100 UNIT/ML
500 SOLUTION INTRAVENOUS AS NEEDED
Status: CANCELLED | OUTPATIENT
Start: 2023-02-07

## 2023-02-07 RX ORDER — HEPARIN SODIUM (PORCINE) LOCK FLUSH IV SOLN 100 UNIT/ML 100 UNIT/ML
500 SOLUTION INTRAVENOUS AS NEEDED
Status: DISCONTINUED | OUTPATIENT
Start: 2023-02-07 | End: 2023-02-07 | Stop reason: HOSPADM

## 2023-02-07 RX ADMIN — Medication 500 UNITS: at 08:16

## 2023-02-07 RX ADMIN — Medication 10 ML: at 08:16

## 2023-02-07 NOTE — PROGRESS NOTES
[facility] MULTIDISCIPLINARY CLINIC  SURVIVORSHIP VISIT: IN CLINIC  Survivorship Treatment Summary Initial Visit        Bebe Kiser is a pleasant 58 y.o. female being followed by Alba Bernardo MD for left breast invasive ductal carcinoma. Reviewed today in Multidisciplinary Clinic, for initial survivorship treatment summary visit.     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  She is status post bilateral mastectomy with 3 negative sentinel lymph nodes on 10/18/2022; completed 4 cycles of neoadjuvant Taxotere cyclophosphamide on 1/16/2023.  She elected to delay reconstruction.  She will meet with Dr. Agustin on 2/21/2023 to discuss planning.  Patient is hopeful to get surgery underway in April around the time she is at spring break for work.    She is feeling pretty good today.  She has made a dramatic improvement with regards to her energy in the last 2 weeks.  She returned to work last Monday works for [employer] with pre-k.  Difficulty sleeping sometimes more than 4 hours typically waking and having a hard time getting back to sleep.  She is denying any pain.  Her appetite is still decreased although she is trying to eat regular meals focusing on soups and broths.  Food still does have a metallic taste.    She notes that her daughter did go for genetic testing also and was found to be PALB2 positive as well.  Her daughter had bilateral MRIs with an area of concern found and will be having a biopsy completed in the next week or so.    TREATMENT HISTORY:     Oncology/Hematology History   Malignant neoplasm of upper-inner quadrant of left breast in female, estrogen receptor negative (HCC)   1/5/2016 Imaging    Bilateral Diagnostic MMG ([facility]):  The parenchyma of both breasts is largely fatty-replaced. I see no masses, areas of architectural distortion or skin thickening. There is no evidence for axillary  lymphadenopathy or nipple retraction.  BI-RADS 1: Negative.     8/3/2022 Initial Diagnosis    Malignant neoplasm of upper-inner quadrant of left breast in female, estrogen receptor negative (HCC)     8/4/2022 Imaging    Screening MMG with Gautam ([facility]):  The breasts are almost entirely fatty.   There is a new small, oval mass measuring 8 mm with indistinct margins and associated fine-linear calcifications seen in the middle one-third region of the left breast at 9 o'clock.  In the right breast, no suspicious masses, significant calcifications or other abnormalities are seen.  BI-RADS 0: Incomplete.      8/19/2022 Imaging    Left Diagnostic MMG with Gautam & Left Breast US ([facility]):   MMG:  On the present examination, there is a small, irregular mass measuring 6 mm with indistinct margins and associated fine-linear calcifications in the middle one-third 9:30 o'clock region of the left breast located 10 centimeters from the nipple.   US:   Ultrasound demonstrates an irregular heterogeneous solid mass with indistinct margins measuring 5 x 5 x 4 mm in the middle one-third 9:30 o'clock region of the left breast located 10 centimeters from the nipple.   BI-RADS 4C: Suspicious.      8/26/2022 Biopsy    Left Breast, US-Guided Biopsy ([facility]):    Left Breast, 9:30, Core Biopsies:   Invasive Mammary Carcinoma, No special type (Ductal)    Histologic Grade (Mame):     Glandular/Tubular Score: 3     Nuclear Score: 3     Mitotic Score: 2     Overall Grade: 3 (High Grade)    Size: 0.7 cm    Lymphovascular Invasion: Not Identified     Microcalcifications: Microcalcifications in Invasive Carcinoma.   No DCIS is Identified.   -Clip projects up to 6 mm away from residual calcifications on postbiopsy mammogram.    ER negative (0%)  KS negative (0%)  Her2 negative (IHC 1+)  Ki-67 58.07%     9/8/2022 Genetic Testing    Breast & Gyn Cancers Panel (36 genes):    PALB2 pathogenic mutation     9/27/2022 Imaging    Bilateral Breast MRI ([facility]):  In the middle third upper inner quadrant of the left breast centered at the 10 o'clock position on the order of 7 cm from nipple there is an irregular enhancing mass that measures on the order of 0.7 cm in anterior to posterior dimension, 0.6 cm in the superior to inferior dimension and 0.8 cm in the medial to lateral dimension. A focal signal void is seen at the posterior margin of the lesion. This represents the biopsy-proven site of malignancy with the adjacent mini cork-shaped metallic clip. Below threshold nonspecific thin linear enhancement that measures up to 1 cm in length is seen at the posterior margin of the biopsy clip. No mammographic abnormality in this region is appreciated.  Located on the order of 1 cm anterior to the biopsy-proven malignancy at the 10 o'clock axis is a 0.6 cm ill-defined focus of enhancement. Mammographically there are faint microcalcifications that are seen in this region on the mammogram dated 08/19/2022, these can be seen on the spot magnification image and to a lesser degree on the mammogram dated 8/4/2022.  No other areas of suspicious enhancement or morphology are seen in the left breast. I see no evidence for abnormal left breast skin, nipple or chest wall enhancement and there is no evidence for left axillary or internal mammary chain adenopathy.   There are no areas of suspicious enhancement or morphology in the right breast. I see no evidence for abnormal right breast skin, nipple or chest wall enhancement and there is no evidence for right axillary or internal mammary chain adenopathy.  BI-RADS 4: Suspicious.     10/18/2022 Surgery    Port placement, bilateral skin-sparing mastectomy and left sentinel lymph node biopsy-    1. Right Breast, Simple Skin-Sparing Mastectomy (980 Grams):               A. Benign breast parenchyma with no significant histopathologic changes.               B. Unremarkable skin and nipple.                 2. Left Axillary [place] Lymph  Node #1, Hot and Blue, Count 1600, Biopsy (FS):               A. One lymph node, negative for carcinoma (0/1).                 3. Left Axillary [place] Lymph Node #2, Hot not Blue, Count 500, Biopsy (FS):               A. Two lymph nodes, negative for carcinoma (0/2).     4. Left Breast, Simple Skin-Sparing Mastectomy (1,170 Grams):               A. INVASIVE DUCTAL CARCINOMA, Poorly differentiated; [place] Histologic Grade III/III       (tubule score = 3, nuclear score = 3, mitoses score = 3):                            1. Tumor size: 8 mm (measured on slide 4D).                            2. Margins are negative for invasive carcinoma; Closest distance: Invasive carcinoma is       present 14 mm from the anterior surface.                            3. Negative for lymphovascular space invasion.               B. Clip and biopsy site changes are present and adjacent to invasive carcinoma.               C. Unremarkable skin and nipple.               D. See Synoptic Report (to include parts 2-3) and Comment.    ER negative (<1%)  WV negative (<1%)  Her2 negative (IHC 1+)  Ki-67 65%     11/14/2022 - 1/16/2023 Chemotherapy    OP BREAST TC DOCEtaxel / Cyclophosphamide           Past Medical History:   Diagnosis Date   • Breast cancer (HCC)     LEFT   • Cataract 2017   • Elevated cholesterol    • Hypertension        Past Surgical History:   Procedure Laterality Date   • BREAST SURGERY  10/18/2022   • CATARACT EXTRACTION Bilateral 2019   • COLONOSCOPY      2013   • MASTECTOMY WITH SENTINEL NODE BIOPSY AND AXILLARY NODE DISSECTION Bilateral 10/18/2022    Procedure: bilateral skin-sparing mastectomy and left sentinel lymph node biopsy;  Surgeon: Manisha Holley MD;  Location: [facility];  Service: General;  Laterality: Bilateral;   • VENOUS ACCESS DEVICE (PORT) INSERTION Right 10/18/2022    Procedure: Port placement;  Surgeon: Manisha Holley MD;  Location: [facility];  Service: General;  Laterality:  Right;       Social History     Socioeconomic History   • Marital status:    • Number of children: 3   Tobacco Use   • Smoking status: Never   • Smokeless tobacco: Never   Vaping Use   • Vaping Use: Never used   Substance and Sexual Activity   • Alcohol use: Not Currently     Comment: OCCASIONAL   • Drug use: No   • Sexual activity: Yes     Partners: Male     Birth control/protection: None         No results found for: LDH, URICACID    Lab Results   Component Value Date    GLUCOSE 122 01/23/2023    BUN 12 01/23/2023    CREATININE 0.73 01/23/2023    EGFRIFNONA 70 04/09/2021    EGFRIFAFRI 84 04/09/2021    BCR 16.4 01/23/2023    K 3.7 01/23/2023    CO2 24.6 01/23/2023    CALCIUM 9.1 01/23/2023    PROTENTOTREF 7.1 04/01/2022    ALBUMIN 3.7 01/23/2023    LABIL2 1.7 04/01/2022    AST 17 01/23/2023    ALT 8 01/23/2023       CBC w/diff    CBC w/Diff 1/4/23 1/16/23 1/23/23   WBC 30.10 (A) 18.59 (A) 24.42 (A)   RBC 3.07 (A) 3.07 (A) 3.06 (A)   Hemoglobin 9.7 (A) 10.0 (A) 9.8 (A)   Hematocrit 29.5 (A) 30.1 (A) 29.4 (A)   MCV 96.1 98.0 (A) 96.1   MCH 31.6 32.6 32.0   MCHC 32.9 33.2 33.3   RDW 15.1 16.3 (A) 15.5 (A)   Platelets 266 257 228   Neutrophil Rel % 58.9 83.4 (A) 57.1   Immature Granulocyte Rel % 22.4 (A) 0.9 (A) 19.5 (A)   Lymphocyte Rel % 8.9 (A) 8.6 (A) 7.5 (A)   Monocyte Rel % 9.5 7.0 15.1 (A)   Eosinophil Rel % 0.2 (A) 0.0 (A) 0.5   Basophil Rel % 0.1 0.1 0.3   (A) Abnormal value              Allergies as of 02/07/2023   • (No Known Allergies)       MEDICATIONS:  Medication list reviewed today    Review of Systems   Constitutional: Positive for appetite change (With taste changes/metallic taste). Negative for activity change, fatigue (Minimal) and unexpected weight change.   HENT: Negative for trouble swallowing.    Respiratory: Positive for shortness of breath (With long periods of ambulation, resolves with rest). Negative for chest tightness.    Cardiovascular: Negative for chest pain and leg swelling.    Gastrointestinal: Negative for blood in stool, constipation and diarrhea.   Genitourinary: Negative for dysuria and hematuria.   Musculoskeletal: Negative for arthralgias and myalgias.   Neurological: Negative for dizziness, weakness, light-headedness and numbness.   Psychiatric/Behavioral: Positive for sleep disturbance. Negative for dysphoric mood, self-injury and suicidal ideas. The patient is not nervous/anxious.        /70   Pulse 86   Temp 97 °F (36.1 °C) (Temporal)   Resp 18   Wt 94.5 kg (208 lb 6.4 oz)   LMP  (LMP Unknown)   SpO2 97% Comment: Room air  BMI 35.77 kg/m²     Wt Readings from Last 3 Encounters:   02/07/23 94.5 kg (208 lb 6.4 oz)   02/02/23 92.1 kg (203 lb)   01/23/23 92.1 kg (203 lb)        Pain Score    02/07/23 0852   PainSc: 0-No pain       PHQ-9 Total Score: 3   GAD7 Total Score: 2   Distress Score: 8   Fatigue Severity Scale: 4      Physical Exam  Constitutional:       Appearance: Normal appearance. She is well-groomed.   HENT:      Head: Normocephalic and atraumatic.   Cardiovascular:      Rate and Rhythm: Normal rate and regular rhythm.   Pulmonary:      Effort: No tachypnea or respiratory distress.   Abdominal:      General: Abdomen is flat. There is no distension.   Musculoskeletal:      Right ankle: No swelling. No tenderness. Normal pulse.      Left ankle: No swelling. No tenderness. Normal pulse.   Skin:     General: Skin is warm and dry.   Neurological:      Mental Status: She is alert and oriented to person, place, and time.   Psychiatric:         Attention and Perception: Attention and perception normal.         Mood and Affect: Mood and affect normal.         Speech: Speech normal.         Behavior: Behavior normal. Behavior is cooperative.         Thought Content: Thought content normal.           Advance Care Planning     Patient does have advance care planning complete, we do not have a copy on file    Patient has not designated a healthcare surrogate:     We  reviewed each section of the [place] living will directive today and patient was provided with conversations that matter booklet    Brief discussion and written information provided regarding advance care planning and appropriateness for all healthy adults, choosing a healthcare surrogate, prior experiences with loved ones who have been seriously ill, and exploration of goals of care in the event of a sudden injury or illness. Information provided on upcoming Advance Care Planning classes offered virtually through [facility]. Advised arrangements can be made to schedule an appointment with myself or another advance care planning facilitator at their convenience.          DISCUSSION HELD TODAY:   Discussed NCCN recommendations for all cancer survivors of 150 minutes/week moderate intensity exercise, achieve and maintain a healthy weight, plants-based whole-foods diet, avoid tobacco and second hand smoke, avoid alcohol or minimize alcohol intake - no more than 1 drink in a day for adults.     A copy of the Survivorship Treatment Summary & Care Plan for Ms. Kiser was provided to and forwarded to the providers identified on the care team.    Problems identified:  1. Lymphedema: She has had baseline evaluation with the lymphedema clinic.  She denies any pain discomfort or swelling.  She has full range of motion of her upper extremities.  She will see them for follow-up at the end of the month.    2. Fatigue: This is greatly improved from 2 weeks ago.  She returned to work last Monday tolerating her work days generally well.  When she is not at work she is spending a lot of time busy with her grandchildren.  We talked about ways to modestly increase amount of daily physical activity such as walking.  We also discussed community-based programs  3. Sleep pattern disruption: She is able to sleep about 4 hours in a row without interruption however she tends to wake up and has a hard time getting back to sleep.  She has  used melatonin in the past and found it helpful.  I recommended she restart that  4. Psychosocial: We reviewed resources available for herself as well as for her daughter in the community including [place] [employer] and [employer].  5. Surveillance: We discussed with bilateral mastectomy no further breast imaging is recommended; she continues to get regular cervical cancer screening; she is due for her next screening colonoscopy this year; she is a never smoker    Plan and recommendations:  1. Continue follow-up with lymphedema clinic  2. Melatonin at bedtime for sleep pattern disruption  3. We discussed [employer] for peer based support  4. We discussed [facility] Room program  5. Physical activity as tolerated, recommend incorporating walking into her routine  6. Surveillance as above, she will need a colonoscopy this year  7. She will see plastic surgery on 2/21/2023 hopeful for reconstruction to happen around April 8. Call my office as needed at [phone] for additional information, resources or support.      Diagnoses and all orders for this visit:    1. Malignant neoplasm of upper-inner quadrant of left breast in female, estrogen receptor negative (HCC) (Primary)            I spent 40 minutes caring for this patient on this date of service by face-to-face counseling. This time includes time spent by me in the following activities: preparing for the visit, reviewing tests, obtaining and/or reviewing a separately obtained history, performing a medically appropriate examination and/or evaluation, counseling and educating the patient/family/caregiver, documenting information in the medical record and care coordination

## 2023-03-21 ENCOUNTER — OFFICE VISIT (OUTPATIENT)
Dept: ONCOLOGY | Facility: CLINIC | Age: 58
End: 2023-03-21
Payer: COMMERCIAL

## 2023-03-21 ENCOUNTER — INFUSION (OUTPATIENT)
Dept: ONCOLOGY | Facility: HOSPITAL | Age: 58
End: 2023-03-21
Payer: COMMERCIAL

## 2023-03-21 VITALS
TEMPERATURE: 97.1 F | SYSTOLIC BLOOD PRESSURE: 101 MMHG | WEIGHT: 199.8 LBS | DIASTOLIC BLOOD PRESSURE: 73 MMHG | HEIGHT: 64 IN | HEART RATE: 64 BPM | OXYGEN SATURATION: 98 % | RESPIRATION RATE: 14 BRPM | BODY MASS INDEX: 34.11 KG/M2

## 2023-03-21 DIAGNOSIS — C50.212 MALIGNANT NEOPLASM OF UPPER-INNER QUADRANT OF LEFT BREAST IN FEMALE, ESTROGEN RECEPTOR NEGATIVE: ICD-10-CM

## 2023-03-21 DIAGNOSIS — Z45.2 ENCOUNTER FOR FITTING AND ADJUSTMENT OF VASCULAR CATHETER: Primary | ICD-10-CM

## 2023-03-21 DIAGNOSIS — Z17.1 MALIGNANT NEOPLASM OF UPPER-INNER QUADRANT OF LEFT BREAST IN FEMALE, ESTROGEN RECEPTOR NEGATIVE: ICD-10-CM

## 2023-03-21 LAB
ALBUMIN SERPL-MCNC: 4.2 G/DL (ref 3.5–5.2)
ALBUMIN/GLOB SERPL: 1.6 G/DL (ref 1.1–2.4)
ALP SERPL-CCNC: 55 U/L (ref 38–116)
ALT SERPL W P-5'-P-CCNC: 12 U/L (ref 0–33)
ANION GAP SERPL CALCULATED.3IONS-SCNC: 9.4 MMOL/L (ref 5–15)
AST SERPL-CCNC: 17 U/L (ref 0–32)
BASOPHILS # BLD AUTO: 0.03 10*3/MM3 (ref 0–0.2)
BASOPHILS NFR BLD AUTO: 0.4 % (ref 0–1.5)
BILIRUB SERPL-MCNC: 0.3 MG/DL (ref 0.2–1.2)
BUN SERPL-MCNC: 15 MG/DL (ref 6–20)
BUN/CREAT SERPL: 20.5 (ref 7.3–30)
CALCIUM SPEC-SCNC: 9.6 MG/DL (ref 8.5–10.2)
CHLORIDE SERPL-SCNC: 106 MMOL/L (ref 98–107)
CO2 SERPL-SCNC: 24.6 MMOL/L (ref 22–29)
CREAT SERPL-MCNC: 0.73 MG/DL (ref 0.6–1.1)
DEPRECATED RDW RBC AUTO: 44.2 FL (ref 37–54)
EGFRCR SERPLBLD CKD-EPI 2021: 94.3 ML/MIN/1.73
EOSINOPHIL # BLD AUTO: 0.37 10*3/MM3 (ref 0–0.4)
EOSINOPHIL NFR BLD AUTO: 4.5 % (ref 0.3–6.2)
ERYTHROCYTE [DISTWIDTH] IN BLOOD BY AUTOMATED COUNT: 12.9 % (ref 12.3–15.4)
FERRITIN SERPL-MCNC: 270.3 NG/ML (ref 13–150)
FOLATE SERPL-MCNC: 8.71 NG/ML (ref 4.78–24.2)
GLOBULIN UR ELPH-MCNC: 2.6 GM/DL (ref 1.8–3.5)
GLUCOSE SERPL-MCNC: 120 MG/DL (ref 74–124)
HCT VFR BLD AUTO: 33.3 % (ref 34–46.6)
HGB BLD-MCNC: 10.8 G/DL (ref 12–15.9)
IMM GRANULOCYTES # BLD AUTO: 0.02 10*3/MM3 (ref 0–0.05)
IMM GRANULOCYTES NFR BLD AUTO: 0.2 % (ref 0–0.5)
IRON 24H UR-MRATE: 62 MCG/DL (ref 37–145)
IRON SATN MFR SERPL: 20 % (ref 14–48)
LYMPHOCYTES # BLD AUTO: 1.94 10*3/MM3 (ref 0.7–3.1)
LYMPHOCYTES NFR BLD AUTO: 23.6 % (ref 19.6–45.3)
MCH RBC QN AUTO: 30.2 PG (ref 26.6–33)
MCHC RBC AUTO-ENTMCNC: 32.4 G/DL (ref 31.5–35.7)
MCV RBC AUTO: 93 FL (ref 79–97)
MONOCYTES # BLD AUTO: 0.56 10*3/MM3 (ref 0.1–0.9)
MONOCYTES NFR BLD AUTO: 6.8 % (ref 5–12)
NEUTROPHILS NFR BLD AUTO: 5.3 10*3/MM3 (ref 1.7–7)
NEUTROPHILS NFR BLD AUTO: 64.5 % (ref 42.7–76)
NRBC BLD AUTO-RTO: 0 /100 WBC (ref 0–0.2)
PLATELET # BLD AUTO: 243 10*3/MM3 (ref 140–450)
PMV BLD AUTO: 10.6 FL (ref 6–12)
POTASSIUM SERPL-SCNC: 4.2 MMOL/L (ref 3.5–4.7)
PROT SERPL-MCNC: 6.8 G/DL (ref 6.3–8)
RBC # BLD AUTO: 3.58 10*6/MM3 (ref 3.77–5.28)
SODIUM SERPL-SCNC: 140 MMOL/L (ref 134–145)
TIBC SERPL-MCNC: 309 MCG/DL (ref 249–505)
TRANSFERRIN SERPL-MCNC: 221 MG/DL (ref 200–360)
VIT B12 BLD-MCNC: 462 PG/ML (ref 211–946)
WBC NRBC COR # BLD: 8.22 10*3/MM3 (ref 3.4–10.8)

## 2023-03-21 PROCEDURE — 80053 COMPREHEN METABOLIC PANEL: CPT

## 2023-03-21 PROCEDURE — 36415 COLL VENOUS BLD VENIPUNCTURE: CPT

## 2023-03-21 PROCEDURE — 83540 ASSAY OF IRON: CPT

## 2023-03-21 PROCEDURE — 99214 OFFICE O/P EST MOD 30 MIN: CPT | Performed by: INTERNAL MEDICINE

## 2023-03-21 PROCEDURE — 82607 VITAMIN B-12: CPT | Performed by: INTERNAL MEDICINE

## 2023-03-21 PROCEDURE — 25010000002 HEPARIN LOCK FLUSH PER 10 UNITS: Performed by: INTERNAL MEDICINE

## 2023-03-21 PROCEDURE — 36591 DRAW BLOOD OFF VENOUS DEVICE: CPT

## 2023-03-21 PROCEDURE — 82728 ASSAY OF FERRITIN: CPT

## 2023-03-21 PROCEDURE — 84466 ASSAY OF TRANSFERRIN: CPT

## 2023-03-21 PROCEDURE — 82746 ASSAY OF FOLIC ACID SERUM: CPT | Performed by: INTERNAL MEDICINE

## 2023-03-21 PROCEDURE — 85025 COMPLETE CBC W/AUTO DIFF WBC: CPT

## 2023-03-21 RX ORDER — HEPARIN SODIUM (PORCINE) LOCK FLUSH IV SOLN 100 UNIT/ML 100 UNIT/ML
500 SOLUTION INTRAVENOUS AS NEEDED
Status: DISCONTINUED | OUTPATIENT
Start: 2023-03-21 | End: 2023-03-21 | Stop reason: HOSPADM

## 2023-03-21 RX ORDER — HEPARIN SODIUM (PORCINE) LOCK FLUSH IV SOLN 100 UNIT/ML 100 UNIT/ML
500 SOLUTION INTRAVENOUS AS NEEDED
OUTPATIENT
Start: 2023-03-21

## 2023-03-21 RX ORDER — SODIUM CHLORIDE 0.9 % (FLUSH) 0.9 %
10 SYRINGE (ML) INJECTION AS NEEDED
Status: DISCONTINUED | OUTPATIENT
Start: 2023-03-21 | End: 2023-03-21 | Stop reason: HOSPADM

## 2023-03-21 RX ORDER — SODIUM CHLORIDE 0.9 % (FLUSH) 0.9 %
10 SYRINGE (ML) INJECTION AS NEEDED
OUTPATIENT
Start: 2023-03-21

## 2023-03-21 RX ADMIN — Medication 10 ML: at 10:09

## 2023-03-21 RX ADMIN — Medication 500 UNITS: at 10:09

## 2023-03-21 NOTE — PROGRESS NOTES
Subjective   Bebe Kiser is a 58 y.o. female.  Referred by Dr. Holley for left breast invasive ductal carcinoma, triple negative    History of Present Illness     Patient is a 57-year-old postmenopausal -American lady who presented with a screen detected abnormality of the left breast.    She denies any family history of breast cancer.  No previous abnormal mammograms or biopsies.  Comorbidities include hypertension.    8/4/2022-bilateral screening mammogram  Platelets are almost entirely fatty.  There is a new small, oval mass measuring 8 mm with indistinct margins, associated fine linear calcifications in the middle one third region of the left breast at 9:00.  No suspicious abnormalities of the right breast.    8/19/2022-left breast diagnostic mammogram  The breast is almost entirely fatty.  Additional evaluation of the left breast at 9:00 there is a small irregular mass measuring 6 mm with indistinct margins and associated fine linear calcifications in the middle one third region of the left breast, 10 cm from the nipple.    Left breast ultrasound  Ultrasound demonstrates an irregular heterogeneous mass with indistinct margins measuring 5 x 5 x 4 mm in the middle third, 9:30 position of the left breast, 10 cm from the nipple    Impression  Ultrasound-guided biopsy recommended.    8/25/2022-ultrasound-guided biopsy  Pathology consistent with invasive ductal carcinoma  Grade 3  Tumor size 0.7 cm  No lymphovascular space invasion  ER negative  CO negative  HER2 1+  Ki-67 58.07%    9/8/2022-genetic testing ordered.  Positive for PAL B2 pathogenic mutation.    She was evaluated by Dr. Holley on 9/8/2022 and referred for discussion of neoadjuvant versus adjuvant therapy.    9/27/2022-bilateral breast MRI-biopsy-proven malignancy in the left breast at 10 o'clock position measures 0.8 cm.  0.6 cm ill-defined focus of enhancement 1 cm anterior to the biopsy-proven malignancy.  Mammographically microcalcifications  are seen.  Correlation with left breast biopsy or surgical excision of the calcifications is recommended.  No suspicious findings in the right breast.    10/18/2022-bilateral mastectomy  1.right breast simple skin sparing mastectomy-benign breast parenchyma with no significant changes.  2.left axillary sentinel lymph node 1-benign  3.left axillary sentinel lymph node 2-benign  4.left breast simple skin sparing mastectomy  A.invasive ductal carcinoma, poorly differentiated, grade 3  Tumor measures 8 mm  Margins are negative for invasive carcinoma  No lymphovascular space invasion  B.clip and biopsy site changes  Seen on unremarkable skin and nipple    Receptors repeated and confirmed to be ER negative, VA negative, HER2 1+ on immunohistochemistry  Ki-67 65%    Patient presents to the clinic today accompanied by her daughter to discuss adjuvant therapy recommendations.  She is recovering well from surgery.  No tissue expanders placed and plans to perform reconstruction following completion of chemotherapy    Her daughter underwent genetic testing and tested positive for PAL B2 mutation.    Ms. torres received 4 cycles of Taxotere and cyclophosphamide.  She tolerated treatment well.    Interval History:  Presents today for follow-up.  Denies any new chest wall lesions.  She is scheduled for reconstructive surgery in June 2023.  She plans to get the port removed at that time.  She feels like she is back to baseline.  She has not had a GYN appointment yet.    The following portions of the patient's history were reviewed and updated as appropriate: allergies, current medications, past family history, past medical history, past social history, past surgical history and problem list.    Past Medical History:   Diagnosis Date   • Breast cancer (HCC)     LEFT   • Cataract 2017   • Elevated cholesterol    • Hypertension         Past Surgical History:   Procedure Laterality Date   • BREAST SURGERY  10/18/2022   • CATARACT  EXTRACTION Bilateral    • COLONOSCOPY         • MASTECTOMY WITH SENTINEL NODE BIOPSY AND AXILLARY NODE DISSECTION Bilateral 10/18/2022    Procedure: bilateral skin-sparing mastectomy and left sentinel lymph node biopsy;  Surgeon: Manisha Holley MD;  Location: [facility];  Service: General;  Laterality: Bilateral;   • VENOUS ACCESS DEVICE (PORT) INSERTION Right 10/18/2022    Procedure: Port placement;  Surgeon: Manisha Holley MD;  Location: [facility];  Service: General;  Laterality: Right;        Family History   Problem Relation Age of Onset   • Hypotension Mother    • Asthma Mother    • No Known Problems Father    • Hypertension Brother    • Breast cancer Neg Hx    • Ovarian cancer Neg Hx    • Uterine cancer Neg Hx    • Colon cancer Neg Hx    • Malig Hyperthermia Neg Hx         Social History     Socioeconomic History   • Marital status:    • Number of children: 3   Tobacco Use   • Smoking status: Never   • Smokeless tobacco: Never   Vaping Use   • Vaping Use: Never used   Substance and Sexual Activity   • Alcohol use: Not Currently     Comment: OCCASIONAL   • Drug use: No   • Sexual activity: Yes     Partners: Male     Birth control/protection: None        OB History        2    Para   2    Term   2            AB        Living   2       SAB        IAB        Ectopic        Molar        Multiple        Live Births   2             Age at menarche-10   3 para 2  1  Age at first live childbirth-20  Age at menopause-47  All contraceptive pill use-30 years  Hormone replacement therapy-none    No Known Allergies     Review of Systems   Constitutional: Negative.    HENT: Negative.    Eyes: Negative.    Respiratory: Negative.    Cardiovascular: Negative.    Gastrointestinal: Negative.    Endocrine: Negative.    Genitourinary: Negative.    Musculoskeletal: Negative.    Allergic/Immunologic: Negative.    Neurological: Negative.    Hematological: Negative.   "  Psychiatric/Behavioral: The patient is nervous/anxious.      Review of systems as mentioned in the HPI    Objective   Blood pressure 101/73, pulse 64, temperature 97.1 °F (36.2 °C), temperature source Temporal, resp. rate 14, height 162.6 cm (64.02\"), weight 90.6 kg (199 lb 12.8 oz), SpO2 98 %, not currently breastfeeding.     Physical Exam  Vitals reviewed.   Constitutional:       Appearance: Normal appearance.   HENT:      Head: Normocephalic and atraumatic.      Nose: Nose normal.      Mouth/Throat:      Mouth: Mucous membranes are moist.      Pharynx: Oropharynx is clear.   Eyes:      Conjunctiva/sclera: Conjunctivae normal.      Pupils: Pupils are equal, round, and reactive to light.   Cardiovascular:      Rate and Rhythm: Normal rate and regular rhythm.      Pulses: Normal pulses.      Heart sounds: Normal heart sounds.   Pulmonary:      Effort: Pulmonary effort is normal.      Breath sounds: Normal breath sounds.   Abdominal:      General: Abdomen is flat. Bowel sounds are normal.   Musculoskeletal:         General: Normal range of motion.      Cervical back: Normal range of motion.   Skin:     General: Skin is warm and dry.      Findings: No rash.   Neurological:      General: No focal deficit present.      Mental Status: She is alert and oriented to person, place, and time.      Motor: No weakness.   Psychiatric:         Mood and Affect: Mood normal.         Behavior: Behavior normal.         Thought Content: Thought content normal.         Judgment: Judgment normal.       Breast Exam: Status post bilateral mastectomy without reconstruction.  No expanders.    I have reexamined the patient and the results are consistent with the previously documented exam. Alba Bernardo MD      No visits with results within 30 Day(s) from this visit.   Latest known visit with results is:   Infusion on 01/23/2023   Component Date Value Ref Range Status   • Glucose 01/23/2023 122  74 - 124 mg/dL Final   • BUN 01/23/2023 " 12  6 - 20 mg/dL Final   • Creatinine 01/23/2023 0.73  0.60 - 1.10 mg/dL Final   • Sodium 01/23/2023 137  134 - 145 mmol/L Final   • Potassium 01/23/2023 3.7  3.5 - 4.7 mmol/L Final   • Chloride 01/23/2023 103  98 - 107 mmol/L Final   • CO2 01/23/2023 24.6  22.0 - 29.0 mmol/L Final   • Calcium 01/23/2023 9.1  8.5 - 10.2 mg/dL Final   • Total Protein 01/23/2023 6.2 (L)  6.3 - 8.0 g/dL Final   • Albumin 01/23/2023 3.7  3.5 - 5.2 g/dL Final   • ALT (SGPT) 01/23/2023 8  0 - 33 U/L Final   • AST (SGOT) 01/23/2023 17  0 - 32 U/L Final   • Alkaline Phosphatase 01/23/2023 64  38 - 116 U/L Final   • Total Bilirubin 01/23/2023 0.3  0.2 - 1.2 mg/dL Final   • Globulin 01/23/2023 2.5  1.8 - 3.5 gm/dL Final   • A/G Ratio 01/23/2023 1.5  1.1 - 2.4 g/dL Final   • BUN/Creatinine Ratio 01/23/2023 16.4  7.3 - 30.0 Final   • Anion Gap 01/23/2023 9.4  5.0 - 15.0 mmol/L Final   • eGFR 01/23/2023 94.9  >60.0 mL/min/1.73 Final   • WBC 01/23/2023 24.42 (H)  3.40 - 10.80 10*3/mm3 Final   • RBC 01/23/2023 3.06 (L)  3.77 - 5.28 10*6/mm3 Final   • Hemoglobin 01/23/2023 9.8 (L)  12.0 - 15.9 g/dL Final   • Hematocrit 01/23/2023 29.4 (L)  34.0 - 46.6 % Final   • MCV 01/23/2023 96.1  79.0 - 97.0 fL Final   • MCH 01/23/2023 32.0  26.6 - 33.0 pg Final   • MCHC 01/23/2023 33.3  31.5 - 35.7 g/dL Final   • RDW 01/23/2023 15.5 (H)  12.3 - 15.4 % Final   • RDW-SD 01/23/2023 53.1  37.0 - 54.0 fl Final   • MPV 01/23/2023 11.4  6.0 - 12.0 fL Final   • Platelets 01/23/2023 228  140 - 450 10*3/mm3 Final   • Neutrophil % 01/23/2023 57.1  42.7 - 76.0 % Final   • Lymphocyte % 01/23/2023 7.5 (L)  19.6 - 45.3 % Final   • Monocyte % 01/23/2023 15.1 (H)  5.0 - 12.0 % Final   • Eosinophil % 01/23/2023 0.5  0.3 - 6.2 % Final   • Basophil % 01/23/2023 0.3  0.0 - 1.5 % Final   • Immature Grans % 01/23/2023 19.5 (H)  0.0 - 0.5 % Final   • Neutrophils, Absolute 01/23/2023 13.95 (H)  1.70 - 7.00 10*3/mm3 Final   • Lymphocytes, Absolute 01/23/2023 1.84  0.70 - 3.10 10*3/mm3  Final   • Monocytes, Absolute 01/23/2023 3.68 (H)  0.10 - 0.90 10*3/mm3 Final   • Eosinophils, Absolute 01/23/2023 0.13  0.00 - 0.40 10*3/mm3 Final   • Basophils, Absolute 01/23/2023 0.07  0.00 - 0.20 10*3/mm3 Final   • Immature Grans, Absolute 01/23/2023 4.75 (H)  0.00 - 0.05 10*3/mm3 Final   • nRBC 01/23/2023 0.1  0.0 - 0.2 /100 WBC Final        No radiology results for the last 30 days.   Assessment & Plan       *Left breast invasive ductal carcinoma  · Grade 3, ER/MA negative, HER2 1+ on immunohistochemistry, Ki-67 58%  · Tumor measures 8 mm on mammogram and 5 mm on ultrasound  · On the biopsy the tumor measures 0.7 cm.  · MRI of the breast confirmed the tumor to be 0.8cm   · Status post bilateral mastectomy on 10/18/2022.  · Pathology consistent with invasive ductal carcinoma of the left breast measuring 8 mm, triple negative, grade 3, Ki-67 65%  · pT1b N0 M0, stage Ia clinical, stage Ib prognostic  · We discussed the benefit of chemotherapy and tumors which are over 5 mm and triple negative.  · Given that the tumor is under 1 cm we will proceed with Taxotere and cyclophosphamide.  · Cycle 1 TC administered 11/14/2022  · 1/16/2023 received fourth cycle of TC  · No evidence of recurrent disease today.     *Pathogenic PAL B2 mutation  · We discussed the autosomal dominant inheritance of the PAL B2 mutation.  · We also discussed the penetrance and the screening recommendations.  · She thinks that her paternal grandmother might have had pancreatic cancer but unsure.  · We discussed the increased risk of pancreatic cancer associated PAL B2 mutation.  Discussed screening recommendations of annual MRCP or EUS for pancreatic cancer starting at the age of 50.  · Given that there is no clear first-degree relative with pancreatic cancer she would not meet guidelines recommendation for screening for pancreatic cancer.  · We discussed the signs and symptoms of pancreatic cancer including nausea, vomiting, epigastric pain  and discomfort, abdominal distention, weight loss  · She does not have a family history of ovarian cancer hence there is no clear indication for risk reducing salpingo-oophorectomy.  Recommend that she discuss this further with her GYN.   · She plans to move her appointment up with her gynecologist.  Recommend that she at the least undergo transvaginal ultrasound and .  · Discussed that the ovarian cancer risk is up to 5%.    *Hypertension-blood pressure 101/73    *Obesity-BMI 36 point    *Anemia  · Asymptomatic  · Hemoglobin still 10.8  · Obtain anemia work-up today including iron studies B12 and folate    PLAN:   · Follow-up with APRN in 4 months and MD in 8 months  · Anemia work-up today        Alba Bernardo MD  03/21/23

## 2023-04-07 DIAGNOSIS — I10 ESSENTIAL HYPERTENSION: ICD-10-CM

## 2023-04-08 RX ORDER — CARVEDILOL 25 MG/1
25 TABLET ORAL 2 TIMES DAILY WITH MEALS
Qty: 180 TABLET | Refills: 0 | Status: SHIPPED | OUTPATIENT
Start: 2023-04-08

## 2023-04-13 NOTE — ANESTHESIA PROCEDURE NOTES
Airway  Urgency: elective    Airway not difficult    General Information and Staff    Patient location during procedure: OR  Anesthesiologist: Yves Ca MD  CRNA/CAA: Amisha Petersen CRNA    Indications and Patient Condition  Indications for airway management: airway protection    Preoxygenated: yes  MILS not maintained throughout  Mask difficulty assessment: 1 - vent by mask    Final Airway Details  Final airway type: endotracheal airway      Successful airway: ETT  Cuffed: yes   Successful intubation technique: direct laryngoscopy  Endotracheal tube insertion site: oral  Blade: Rangel  Blade size: 4  ETT size (mm): 7.5  Cormack-Lehane Classification: grade IIa - partial view of glottis  Placement verified by: chest auscultation and capnometry   Measured from: lips  ETT/EBT  to lips (cm): 22  Number of attempts at approach: 1  Assessment: lips, teeth, and gum same as pre-op and atraumatic intubation    Additional Comments  PreO2 100%, FEO2 >85, SIVI, easy BMV, sniff position, ETT placed/ confirmed, attraumatic, teeth and lips as preop.         Manual Repair Warning Statement: We plan on removing the manually selected variable below in favor of our much easier automatic structured text blocks found in the previous tab. We decided to do this to help make the flow better and give you the full power of structured data. Manual selection is never going to be ideal in our platform and I would encourage you to avoid using manual selection from this point on, especially since I will be sunsetting this feature. It is important that you do one of two things with the customized text below. First, you can save all of the text in a word file so you can have it for future reference. Second, transfer the text to the appropriate area in the Library tab. Lastly, if there is a flap or graft type which we do not have you need to let us know right away so I can add it in before the variable is hidden. No need to panic, we plan to give you roughly 6 months to make the change.

## 2023-04-14 DIAGNOSIS — I10 ESSENTIAL HYPERTENSION: ICD-10-CM

## 2023-04-15 RX ORDER — SPIRONOLACTONE 25 MG/1
25 TABLET ORAL DAILY
Qty: 90 TABLET | Refills: 0 | Status: SHIPPED | OUTPATIENT
Start: 2023-04-15

## 2023-05-16 ENCOUNTER — INFUSION (OUTPATIENT)
Dept: ONCOLOGY | Facility: HOSPITAL | Age: 58
End: 2023-05-16
Payer: COMMERCIAL

## 2023-05-16 DIAGNOSIS — Z45.2 ENCOUNTER FOR FITTING AND ADJUSTMENT OF VASCULAR CATHETER: Primary | ICD-10-CM

## 2023-05-16 PROCEDURE — 25010000002 HEPARIN LOCK FLUSH PER 10 UNITS: Performed by: INTERNAL MEDICINE

## 2023-05-16 PROCEDURE — 96523 IRRIG DRUG DELIVERY DEVICE: CPT

## 2023-05-16 RX ORDER — SODIUM CHLORIDE 0.9 % (FLUSH) 0.9 %
10 SYRINGE (ML) INJECTION AS NEEDED
Status: DISCONTINUED | OUTPATIENT
Start: 2023-05-16 | End: 2023-05-16 | Stop reason: HOSPADM

## 2023-05-16 RX ORDER — SODIUM CHLORIDE 0.9 % (FLUSH) 0.9 %
10 SYRINGE (ML) INJECTION AS NEEDED
OUTPATIENT
Start: 2023-05-16

## 2023-05-16 RX ORDER — HEPARIN SODIUM (PORCINE) LOCK FLUSH IV SOLN 100 UNIT/ML 100 UNIT/ML
500 SOLUTION INTRAVENOUS AS NEEDED
Status: DISCONTINUED | OUTPATIENT
Start: 2023-05-16 | End: 2023-05-16 | Stop reason: HOSPADM

## 2023-05-16 RX ORDER — HEPARIN SODIUM (PORCINE) LOCK FLUSH IV SOLN 100 UNIT/ML 100 UNIT/ML
500 SOLUTION INTRAVENOUS AS NEEDED
OUTPATIENT
Start: 2023-05-16

## 2023-05-16 RX ADMIN — Medication 10 ML: at 14:09

## 2023-05-16 RX ADMIN — Medication 500 UNITS: at 14:09

## 2023-06-06 ENCOUNTER — OFFICE VISIT (OUTPATIENT)
Dept: FAMILY MEDICINE CLINIC | Facility: CLINIC | Age: 58
End: 2023-06-06
Payer: COMMERCIAL

## 2023-06-06 VITALS
HEIGHT: 64 IN | BODY MASS INDEX: 34.31 KG/M2 | DIASTOLIC BLOOD PRESSURE: 106 MMHG | RESPIRATION RATE: 16 BRPM | SYSTOLIC BLOOD PRESSURE: 156 MMHG | WEIGHT: 201 LBS | OXYGEN SATURATION: 97 % | HEART RATE: 79 BPM

## 2023-06-06 DIAGNOSIS — I10 PRIMARY HYPERTENSION: Primary | ICD-10-CM

## 2023-06-06 DIAGNOSIS — R42 DIZZINESS: ICD-10-CM

## 2023-06-06 DIAGNOSIS — R20.2 NUMBNESS AND TINGLING: ICD-10-CM

## 2023-06-06 DIAGNOSIS — R20.0 NUMBNESS AND TINGLING: ICD-10-CM

## 2023-06-06 DIAGNOSIS — H53.8 BLURRED VISION, BILATERAL: ICD-10-CM

## 2023-06-06 DIAGNOSIS — R42 LIGHTHEADEDNESS: ICD-10-CM

## 2023-06-06 DIAGNOSIS — R51.9 ACUTE NONINTRACTABLE HEADACHE, UNSPECIFIED HEADACHE TYPE: ICD-10-CM

## 2023-06-06 PROCEDURE — 99213 OFFICE O/P EST LOW 20 MIN: CPT | Performed by: NURSE PRACTITIONER

## 2023-06-06 RX ORDER — CARVEDILOL 25 MG/1
25 TABLET ORAL 2 TIMES DAILY WITH MEALS
COMMUNITY
End: 2023-06-06 | Stop reason: SDUPTHER

## 2023-06-06 RX ORDER — DIPHENOXYLATE HYDROCHLORIDE AND ATROPINE SULFATE 2.5; .025 MG/1; MG/1
1 TABLET ORAL DAILY
COMMUNITY

## 2023-06-06 RX ORDER — CARVEDILOL 25 MG/1
25 TABLET ORAL 2 TIMES DAILY WITH MEALS
Qty: 180 TABLET | Refills: 0
Start: 2023-06-06

## 2023-06-06 NOTE — ASSESSMENT & PLAN NOTE
Hypertension is  elevated due to stopping all BP medication because Home BP machine readings were low .

## 2023-06-06 NOTE — ASSESSMENT & PLAN NOTE
C/O intermittent frontal headache for the past 2 weeks.  She has not taken medication to help relieve H/A pain.

## 2023-06-06 NOTE — PROGRESS NOTES
Chief Complaint  Hypertension (Follow up blood pressure, headaches and blurred vision numbness in right hand and leg )    Subjective          Bebe Kiser presents to [facility] for  History of Present Illness  She just finished chemotherapy treatment for breast cancer in 1/19/2023 and is here with complaint of low blood pressure and headaches:    Low Blood Pressure - she reports blood pressure dropping during the day.  She noticed her heart rate increasing when blood pressure was low.  Her blood pressure would be normal in morning and would be lower in the afternoon.  She has been off all blood pressure medication for the past 10 days due to her home readings of low blood pressure.  Her home blood pressure readings reflected on log (copy in chart) ranged from /57-76 and HR ranged from .      Headaches - about 2 weeks ago she started with headache that is intermittent.  Headache pain runs across her forehead and is throbbing pain that is worse at night.  Today, her headache pain is throbbing, pain level 4/10 today.   She reports her headache pain will wake her up from a deep sleep with pain level 8/10.   She reports these headaches she is having is consistent with when she had uncontrolled high blood pressure.  She wears readers to read and has noticed blurred vision that is correlated with throbbing headache.  The last time she had her eyes checked was in 2021.  She reports having right arm, right hand, right leg and right foot tingling/numbness sensation with lightheadedness, dizziness, brain fog (have had brain fog with chemo also).   Denies facial tingling/numbness, facial droop, aura, nausea, vomiting, light sensitivity.    She reports as long is she is moving all her symptoms improve and go away.  She has not taken any OTC medication for pain control.  She reports eating regularly and drinks plenty of water.  She denies smoking, illicit drug use and alcohol  "use.    She denies any other complaints today.    Review of Systems   Constitutional:  Negative for chills and fever.   Eyes:  Positive for visual disturbance. Negative for photophobia.   Respiratory:  Positive for chest tightness. Negative for shortness of breath.         1 week ago, felt tightness in chest.   Cardiovascular:  Negative for chest pain and palpitations.   Gastrointestinal:  Negative for nausea and vomiting.   Neurological:  Positive for dizziness, light-headedness, numbness and headaches. Negative for tremors, seizures, syncope, facial asymmetry, speech difficulty and weakness.        Brain fog (also had during chemo treatments)       Objective   Vital Signs:   BP (!) 156/106 (BP Location: Right arm, Patient Position: Sitting, Cuff Size: Adult) Comment: She has not taken any BP medication today.  Pulse 79   Resp 16   Ht 162.6 cm (64.02\")   Wt 91.2 kg (201 lb)   SpO2 97%   BMI 34.48 kg/m²     Physical Exam  Vitals and nursing note reviewed.   Constitutional:       General: She is not in acute distress.     Appearance: Normal appearance. She is not ill-appearing.   HENT:      Head: Normocephalic and atraumatic.   Eyes:      General:         Right eye: No discharge.         Left eye: No discharge.      Extraocular Movements: Extraocular movements intact.      Conjunctiva/sclera: Conjunctivae normal.      Pupils: Pupils are equal, round, and reactive to light.   Cardiovascular:      Rate and Rhythm: Normal rate and regular rhythm.      Heart sounds: Normal heart sounds. No murmur heard.  Pulmonary:      Effort: Pulmonary effort is normal. No respiratory distress.      Breath sounds: Normal breath sounds.   Musculoskeletal:         General: Normal range of motion.      Cervical back: Full passive range of motion without pain, normal range of motion and neck supple. No rigidity. No pain with movement. Normal range of motion.      Comments: Normal strength in bilateral upper and lower extremities. "   Lymphadenopathy:      Cervical: No cervical adenopathy.   Skin:     General: Skin is warm and dry.   Neurological:      General: No focal deficit present.      Mental Status: She is alert and oriented to person, place, and time.      Motor: No weakness.      Coordination: Coordination normal.      Gait: Gait normal.   Psychiatric:         Mood and Affect: Mood normal.      Result Review :                 Assessment and Plan    Diagnoses and all orders for this visit:    1. Primary hypertension (Primary)  Assessment & Plan:  Hypertension is  elevated due to stopping all BP medication because Home BP machine readings were low .    Orders:  -     carvedilol (Coreg) 25 MG tablet; Take 1 tablet by mouth 2 (Two) Times a Day With Meals.  Dispense: 180 tablet; Refill: 0    2. Acute nonintractable headache, unspecified headache type  Assessment & Plan:  C/O intermittent frontal headache for the past 2 weeks.  She has not taken medication to help relieve H/A pain.      3. Blurred vision, bilateral  Assessment & Plan:  C/O blurred vision that correlates with H/A pain.      4. Numbness and tingling  Comments:  C/O having right arm, right hand, right leg and right foot tingling sensation.    5. Dizziness  Assessment & Plan:  C/O dizziness that correlates with H/A pain.      6. Lightheadedness  Assessment & Plan:  C/O lightheadedness that correlates with H/A pain.      RECOMMENDED PATIENT GO TO THE EMERGENCY ROOM FOR EVALUATION AND TREATMENT.  PATIENT AGREED TO GO TO [facility] EMERGENCY ROOM TODAY.        Follow Up   Return for FOLLOW-UP AFTER ED VISIT..  Patient was given instructions and counseling regarding her condition or for health maintenance advice. Please see specific information pulled into the AVS if appropriate.

## 2023-06-12 ENCOUNTER — PRE-ADMISSION TESTING (OUTPATIENT)
Dept: PREADMISSION TESTING | Facility: HOSPITAL | Age: 58
End: 2023-06-12
Payer: COMMERCIAL

## 2023-06-12 VITALS
OXYGEN SATURATION: 97 % | RESPIRATION RATE: 18 BRPM | WEIGHT: 198.7 LBS | TEMPERATURE: 97.2 F | SYSTOLIC BLOOD PRESSURE: 123 MMHG | HEART RATE: 71 BPM | HEIGHT: 64 IN | BODY MASS INDEX: 33.92 KG/M2 | DIASTOLIC BLOOD PRESSURE: 79 MMHG

## 2023-06-12 LAB
ANION GAP SERPL CALCULATED.3IONS-SCNC: 9.2 MMOL/L (ref 5–15)
BUN SERPL-MCNC: 18 MG/DL (ref 8–23)
BUN/CREAT SERPL: 22.2 (ref 7–25)
CALCIUM SPEC-SCNC: 9.8 MG/DL (ref 8.6–10.5)
CHLORIDE SERPL-SCNC: 104 MMOL/L (ref 98–107)
CO2 SERPL-SCNC: 25.8 MMOL/L (ref 22–29)
CREAT SERPL-MCNC: 0.81 MG/DL (ref 0.57–1)
DEPRECATED RDW RBC AUTO: 44.4 FL (ref 37–54)
EGFRCR SERPLBLD CKD-EPI 2021: 83.2 ML/MIN/1.73
ERYTHROCYTE [DISTWIDTH] IN BLOOD BY AUTOMATED COUNT: 13.4 % (ref 12.3–15.4)
GLUCOSE SERPL-MCNC: 95 MG/DL (ref 65–99)
HCT VFR BLD AUTO: 34.6 % (ref 34–46.6)
HGB BLD-MCNC: 11.2 G/DL (ref 12–15.9)
MCH RBC QN AUTO: 29.3 PG (ref 26.6–33)
MCHC RBC AUTO-ENTMCNC: 32.4 G/DL (ref 31.5–35.7)
MCV RBC AUTO: 90.6 FL (ref 79–97)
PLATELET # BLD AUTO: 239 10*3/MM3 (ref 140–450)
PMV BLD AUTO: 10.6 FL (ref 6–12)
POTASSIUM SERPL-SCNC: 4.3 MMOL/L (ref 3.5–5.2)
RBC # BLD AUTO: 3.82 10*6/MM3 (ref 3.77–5.28)
SODIUM SERPL-SCNC: 139 MMOL/L (ref 136–145)
WBC NRBC COR # BLD: 7.55 10*3/MM3 (ref 3.4–10.8)

## 2023-06-12 PROCEDURE — 80048 BASIC METABOLIC PNL TOTAL CA: CPT

## 2023-06-12 PROCEDURE — 85027 COMPLETE CBC AUTOMATED: CPT

## 2023-06-12 PROCEDURE — 36415 COLL VENOUS BLD VENIPUNCTURE: CPT

## 2023-06-12 NOTE — DISCHARGE INSTRUCTIONS
Take the following medications the morning of surgery:  CARVEDILOL    ARRIVE FOR SURGERY AT 5:15 AM      If you are on prescription narcotic pain medication to control your pain you may also take that medication the morning of surgery.    General Instructions:  Do not eat solid food after midnight the night before surgery.  You may drink clear liquids day of surgery but must stop at least one hour before your hospital arrival time.  It is beneficial for you to have a clear drink that contains carbohydrates the day of surgery.  We suggest a 12 to 20 ounce bottle of Gatorade or Powerade for non-diabetic patients or a 12 to 20 ounce bottle of G2 or Powerade Zero for diabetic patients. (Pediatric patients, are not advised to drink a 12 to 20 ounce carbohydrate drink)    Clear liquids are liquids you can see through.  Nothing red in color.     Plain water                               Sports drinks  Sodas                                   Gelatin (Jell-O)  Fruit juices without pulp such as white grape juice and apple juice  Popsicles that contain no fruit or yogurt  Tea or coffee (no cream or milk added)  Gatorade / Powerade  G2 / Powerade Zero    Infants may have breast milk up to four hours before surgery.  Infants drinking formula may drink formula up to six hours before surgery.   Patients who avoid smoking, chewing tobacco and alcohol for 4 weeks prior to surgery have a reduced risk of post-operative complications.  Quit smoking as many days before surgery as you can.  Do not smoke, use chewing tobacco or drink alcohol the day of surgery.   If applicable bring your C-PAP/ BI-PAP machine in with you to preop day of surgery.  Bring any papers given to you in the doctor’s office.  Wear clean comfortable clothes.  Do not wear contact lenses, false eyelashes or make-up.  Bring a case for your glasses.   Bring crutches or walker if applicable.  Remove all piercings.  Leave jewelry and any other valuables at home.  Hair  extensions with metal clips must be removed prior to surgery.  The Pre-Admission Testing nurse will instruct you to bring medications if unable to obtain an accurate list in Pre-Admission Testing.        If you were given a blood bank ID arm band remember to bring it with you the day of surgery.    Preventing a Surgical Site Infection:  For 2 to 3 days before surgery, avoid shaving with a razor because the razor can irritate skin and make it easier to develop an infection.    Any areas of open skin can increase the risk of a post-operative wound infection by allowing bacteria to enter and travel throughout the body.  Notify your surgeon if you have any skin wounds / rashes even if it is not near the expected surgical site.  The area will need assessed to determine if surgery should be delayed until it is healed.  The night prior to surgery shower using a fresh bar of anti-bacterial soap (such as Dial) and clean washcloth.  Sleep in a clean bed with clean clothing.  Do not allow pets to sleep with you.  Shower on the morning of surgery using a fresh bar of anti-bacterial soap (such as Dial) and clean washcloth.  Dry with a clean towel and dress in clean clothing.  Ask your surgeon if you will be receiving antibiotics prior to surgery.  Make sure you, your family, and all healthcare providers clean their hands with soap and water or an alcohol based hand  before caring for you or your wound.    Day of surgery:  Your arrival time is approximately two hours before your scheduled surgery time.  Upon arrival, a Pre-op nurse and Anesthesiologist will review your health history, obtain vital signs, and answer questions you may have.  The only belongings needed at this time will be a list of your home medications and if applicable your C-PAP/BI-PAP machine.  A Pre-op nurse will start an IV and you may receive medication in preparation for surgery, including something to help you relax.     Please be aware that  surgery does come with discomfort.  We want to make every effort to control your discomfort so please discuss any uncontrolled symptoms with your nurse.   Your doctor will most likely have prescribed pain medications.      If you are going home after surgery you will receive individualized written care instructions before being discharged.  A responsible adult must drive you to and from the hospital on the day of your surgery and stay with you for 24 hours.  Discharge prescriptions can be filled by the hospital pharmacy during regular pharmacy hours.  If you are having surgery late in the day/evening your prescription may be e-prescribed to your pharmacy.  Please verify your pharmacy hours or chose a 24 hour pharmacy to avoid not having access to your prescription because your pharmacy has closed for the day.    If you are staying overnight following surgery, you will be transported to your hospital room following the recovery period.  [facility] has all private rooms.    If you have any questions please call Pre-Admission Testing at [phone].  Deductibles and co-payments are collected on the day of service. Please be prepared to pay the required co-pay, deductible or deposit on the day of service as defined by your plan.    Call your surgeon immediately if you experience any of the following symptoms:  Sore Throat  Shortness of Breath or difficulty breathing  Cough  Chills  Body soreness or muscle pain  Headache  Fever  New loss of taste or smell  Do not arrive for your surgery ill.  Your procedure will need to be rescheduled to another time.  You will need to call your physician before the day of surgery to avoid any unnecessary exposure to hospital staff as well as other patients.

## 2023-06-15 ENCOUNTER — ANESTHESIA EVENT (OUTPATIENT)
Dept: PERIOP | Facility: HOSPITAL | Age: 58
End: 2023-06-15
Payer: COMMERCIAL

## 2023-06-15 ENCOUNTER — HOSPITAL ENCOUNTER (OUTPATIENT)
Facility: HOSPITAL | Age: 58
Setting detail: HOSPITAL OUTPATIENT SURGERY
Discharge: HOME | End: 2023-06-15
Attending: PLASTIC SURGERY | Admitting: PLASTIC SURGERY
Payer: COMMERCIAL

## 2023-06-15 ENCOUNTER — ANESTHESIA (OUTPATIENT)
Dept: PERIOP | Facility: HOSPITAL | Age: 58
End: 2023-06-15
Payer: COMMERCIAL

## 2023-06-15 VITALS
HEART RATE: 57 BPM | OXYGEN SATURATION: 98 % | SYSTOLIC BLOOD PRESSURE: 99 MMHG | WEIGHT: 198 LBS | TEMPERATURE: 97.4 F | HEIGHT: 64 IN | BODY MASS INDEX: 33.8 KG/M2 | RESPIRATION RATE: 16 BRPM | DIASTOLIC BLOOD PRESSURE: 67 MMHG

## 2023-06-15 DIAGNOSIS — Z90.13 ACQUIRED ABSENCE OF BREAST AND ABSENT NIPPLE, BILATERAL: ICD-10-CM

## 2023-06-15 PROCEDURE — C1789 PROSTHESIS, BREAST, IMP: HCPCS | Performed by: PLASTIC SURGERY

## 2023-06-15 PROCEDURE — 25010000002 MIDAZOLAM PER 1 MG: Performed by: ANESTHESIOLOGY

## 2023-06-15 PROCEDURE — 25010000002 ONDANSETRON PER 1 MG: Performed by: NURSE ANESTHETIST, CERTIFIED REGISTERED

## 2023-06-15 PROCEDURE — 25010000002 FENTANYL CITRATE (PF) 50 MCG/ML SOLUTION: Performed by: NURSE ANESTHETIST, CERTIFIED REGISTERED

## 2023-06-15 PROCEDURE — 25010000002 SUGAMMADEX 200 MG/2ML SOLUTION: Performed by: NURSE ANESTHETIST, CERTIFIED REGISTERED

## 2023-06-15 PROCEDURE — 25010000002 HYDROMORPHONE PER 4 MG: Performed by: NURSE ANESTHETIST, CERTIFIED REGISTERED

## 2023-06-15 PROCEDURE — 25010000002 PROPOFOL 10 MG/ML EMULSION: Performed by: NURSE ANESTHETIST, CERTIFIED REGISTERED

## 2023-06-15 PROCEDURE — 63710000001 ONDANSETRON PER 8 MG: Performed by: PLASTIC SURGERY

## 2023-06-15 PROCEDURE — 88302 TISSUE EXAM BY PATHOLOGIST: CPT | Performed by: PLASTIC SURGERY

## 2023-06-15 RX ORDER — FLUMAZENIL 0.1 MG/ML
0.2 INJECTION INTRAVENOUS AS NEEDED
Status: DISCONTINUED | OUTPATIENT
Start: 2023-06-15 | End: 2023-06-15 | Stop reason: HOSPADM

## 2023-06-15 RX ORDER — FAMOTIDINE 10 MG/ML
20 INJECTION, SOLUTION INTRAVENOUS ONCE
Status: COMPLETED | OUTPATIENT
Start: 2023-06-15 | End: 2023-06-15

## 2023-06-15 RX ORDER — HYDROCODONE BITARTRATE AND ACETAMINOPHEN 7.5; 325 MG/1; MG/1
1 TABLET ORAL ONCE AS NEEDED
Status: DISCONTINUED | OUTPATIENT
Start: 2023-06-15 | End: 2023-06-15 | Stop reason: HOSPADM

## 2023-06-15 RX ORDER — SODIUM CHLORIDE, SODIUM LACTATE, POTASSIUM CHLORIDE, CALCIUM CHLORIDE 600; 310; 30; 20 MG/100ML; MG/100ML; MG/100ML; MG/100ML
9 INJECTION, SOLUTION INTRAVENOUS CONTINUOUS
Status: DISCONTINUED | OUTPATIENT
Start: 2023-06-15 | End: 2023-06-15 | Stop reason: HOSPADM

## 2023-06-15 RX ORDER — ONDANSETRON HYDROCHLORIDE 8 MG/1
8 TABLET, FILM COATED ORAL ONCE
Status: COMPLETED | OUTPATIENT
Start: 2023-06-15 | End: 2023-06-15

## 2023-06-15 RX ORDER — DOXYCYCLINE 100 MG/1
100 CAPSULE ORAL 2 TIMES DAILY
Qty: 10 CAPSULE | Refills: 0 | Status: SHIPPED | OUTPATIENT
Start: 2023-06-15 | End: 2023-06-20

## 2023-06-15 RX ORDER — EPHEDRINE SULFATE 50 MG/ML
5 INJECTION, SOLUTION INTRAVENOUS ONCE AS NEEDED
Status: DISCONTINUED | OUTPATIENT
Start: 2023-06-15 | End: 2023-06-15 | Stop reason: HOSPADM

## 2023-06-15 RX ORDER — AMOXICILLIN 250 MG
2 CAPSULE ORAL DAILY PRN
Qty: 30 TABLET | Refills: 1 | Status: SHIPPED | OUTPATIENT
Start: 2023-06-15 | End: 2024-06-14

## 2023-06-15 RX ORDER — PROMETHAZINE HYDROCHLORIDE 25 MG/1
25 TABLET ORAL ONCE AS NEEDED
Status: DISCONTINUED | OUTPATIENT
Start: 2023-06-15 | End: 2023-06-15 | Stop reason: HOSPADM

## 2023-06-15 RX ORDER — OXYCODONE HYDROCHLORIDE 5 MG/1
10 TABLET ORAL ONCE
Status: COMPLETED | OUTPATIENT
Start: 2023-06-15 | End: 2023-06-15

## 2023-06-15 RX ORDER — DROPERIDOL 2.5 MG/ML
0.62 INJECTION, SOLUTION INTRAMUSCULAR; INTRAVENOUS
Status: DISCONTINUED | OUTPATIENT
Start: 2023-06-15 | End: 2023-06-15 | Stop reason: HOSPADM

## 2023-06-15 RX ORDER — IPRATROPIUM BROMIDE AND ALBUTEROL SULFATE 2.5; .5 MG/3ML; MG/3ML
3 SOLUTION RESPIRATORY (INHALATION) ONCE AS NEEDED
Status: DISCONTINUED | OUTPATIENT
Start: 2023-06-15 | End: 2023-06-15 | Stop reason: HOSPADM

## 2023-06-15 RX ORDER — LABETALOL HYDROCHLORIDE 5 MG/ML
5 INJECTION, SOLUTION INTRAVENOUS
Status: DISCONTINUED | OUTPATIENT
Start: 2023-06-15 | End: 2023-06-15 | Stop reason: HOSPADM

## 2023-06-15 RX ORDER — PROPOFOL 10 MG/ML
VIAL (ML) INTRAVENOUS AS NEEDED
Status: DISCONTINUED | OUTPATIENT
Start: 2023-06-15 | End: 2023-06-15 | Stop reason: SURG

## 2023-06-15 RX ORDER — CELECOXIB 200 MG/1
400 CAPSULE ORAL ONCE
Status: COMPLETED | OUTPATIENT
Start: 2023-06-15 | End: 2023-06-15

## 2023-06-15 RX ORDER — LIDOCAINE HYDROCHLORIDE 10 MG/ML
0.5 INJECTION, SOLUTION EPIDURAL; INFILTRATION; INTRACAUDAL; PERINEURAL ONCE AS NEEDED
Status: DISCONTINUED | OUTPATIENT
Start: 2023-06-15 | End: 2023-06-15 | Stop reason: HOSPADM

## 2023-06-15 RX ORDER — FENTANYL CITRATE 50 UG/ML
50 INJECTION, SOLUTION INTRAMUSCULAR; INTRAVENOUS
Status: DISCONTINUED | OUTPATIENT
Start: 2023-06-15 | End: 2023-06-15 | Stop reason: HOSPADM

## 2023-06-15 RX ORDER — ACETAMINOPHEN 325 MG/1
650 TABLET ORAL EVERY 4 HOURS PRN
Qty: 60 TABLET | Refills: 0 | Status: SHIPPED | OUTPATIENT
Start: 2023-06-15

## 2023-06-15 RX ORDER — MIDAZOLAM HYDROCHLORIDE 1 MG/ML
1 INJECTION INTRAMUSCULAR; INTRAVENOUS
Status: DISCONTINUED | OUTPATIENT
Start: 2023-06-15 | End: 2023-06-15 | Stop reason: HOSPADM

## 2023-06-15 RX ORDER — ROCURONIUM BROMIDE 10 MG/ML
INJECTION, SOLUTION INTRAVENOUS AS NEEDED
Status: DISCONTINUED | OUTPATIENT
Start: 2023-06-15 | End: 2023-06-15 | Stop reason: SURG

## 2023-06-15 RX ORDER — OXYCODONE HYDROCHLORIDE AND ACETAMINOPHEN 5; 325 MG/1; MG/1
1 TABLET ORAL EVERY 4 HOURS PRN
Qty: 15 TABLET | Refills: 0 | Status: SHIPPED | OUTPATIENT
Start: 2023-06-15

## 2023-06-15 RX ORDER — ONDANSETRON 8 MG/1
8 TABLET, ORALLY DISINTEGRATING ORAL EVERY 8 HOURS PRN
Qty: 10 TABLET | Refills: 1 | Status: SHIPPED | OUTPATIENT
Start: 2023-06-15

## 2023-06-15 RX ORDER — LIDOCAINE HYDROCHLORIDE 20 MG/ML
INJECTION, SOLUTION INFILTRATION; PERINEURAL AS NEEDED
Status: DISCONTINUED | OUTPATIENT
Start: 2023-06-15 | End: 2023-06-15 | Stop reason: SURG

## 2023-06-15 RX ORDER — NALOXONE HCL 0.4 MG/ML
0.2 VIAL (ML) INJECTION AS NEEDED
Status: DISCONTINUED | OUTPATIENT
Start: 2023-06-15 | End: 2023-06-15 | Stop reason: HOSPADM

## 2023-06-15 RX ORDER — ONDANSETRON 2 MG/ML
4 INJECTION INTRAMUSCULAR; INTRAVENOUS ONCE AS NEEDED
Status: DISCONTINUED | OUTPATIENT
Start: 2023-06-15 | End: 2023-06-15 | Stop reason: HOSPADM

## 2023-06-15 RX ORDER — DIPHENHYDRAMINE HYDROCHLORIDE 50 MG/ML
12.5 INJECTION INTRAMUSCULAR; INTRAVENOUS
Status: DISCONTINUED | OUTPATIENT
Start: 2023-06-15 | End: 2023-06-15 | Stop reason: HOSPADM

## 2023-06-15 RX ORDER — SODIUM CHLORIDE, SODIUM LACTATE, POTASSIUM CHLORIDE, CALCIUM CHLORIDE 600; 310; 30; 20 MG/100ML; MG/100ML; MG/100ML; MG/100ML
125 INJECTION, SOLUTION INTRAVENOUS CONTINUOUS
Status: DISCONTINUED | OUTPATIENT
Start: 2023-06-15 | End: 2023-06-15 | Stop reason: HOSPADM

## 2023-06-15 RX ORDER — ONDANSETRON 2 MG/ML
INJECTION INTRAMUSCULAR; INTRAVENOUS AS NEEDED
Status: DISCONTINUED | OUTPATIENT
Start: 2023-06-15 | End: 2023-06-15 | Stop reason: SURG

## 2023-06-15 RX ORDER — OXYCODONE AND ACETAMINOPHEN 7.5; 325 MG/1; MG/1
1 TABLET ORAL EVERY 4 HOURS PRN
Status: DISCONTINUED | OUTPATIENT
Start: 2023-06-15 | End: 2023-06-15 | Stop reason: HOSPADM

## 2023-06-15 RX ORDER — ACETAMINOPHEN 500 MG
1000 TABLET ORAL ONCE
Status: COMPLETED | OUTPATIENT
Start: 2023-06-15 | End: 2023-06-15

## 2023-06-15 RX ORDER — SODIUM CHLORIDE 0.9 % (FLUSH) 0.9 %
3 SYRINGE (ML) INJECTION EVERY 12 HOURS SCHEDULED
Status: DISCONTINUED | OUTPATIENT
Start: 2023-06-15 | End: 2023-06-15 | Stop reason: HOSPADM

## 2023-06-15 RX ORDER — HYDROMORPHONE HYDROCHLORIDE 1 MG/ML
0.5 INJECTION, SOLUTION INTRAMUSCULAR; INTRAVENOUS; SUBCUTANEOUS
Status: DISCONTINUED | OUTPATIENT
Start: 2023-06-15 | End: 2023-06-15 | Stop reason: HOSPADM

## 2023-06-15 RX ORDER — FENTANYL CITRATE 50 UG/ML
50 INJECTION, SOLUTION INTRAMUSCULAR; INTRAVENOUS ONCE AS NEEDED
Status: DISCONTINUED | OUTPATIENT
Start: 2023-06-15 | End: 2023-06-15 | Stop reason: HOSPADM

## 2023-06-15 RX ORDER — DOCUSATE SODIUM 250 MG
250 CAPSULE ORAL 2 TIMES DAILY PRN
Qty: 60 CAPSULE | Refills: 1 | Status: SHIPPED | OUTPATIENT
Start: 2023-06-15

## 2023-06-15 RX ORDER — GABAPENTIN 300 MG/1
300 CAPSULE ORAL ONCE
Status: COMPLETED | OUTPATIENT
Start: 2023-06-15 | End: 2023-06-15

## 2023-06-15 RX ORDER — SODIUM CHLORIDE 0.9 % (FLUSH) 0.9 %
3-10 SYRINGE (ML) INJECTION AS NEEDED
Status: DISCONTINUED | OUTPATIENT
Start: 2023-06-15 | End: 2023-06-15 | Stop reason: HOSPADM

## 2023-06-15 RX ORDER — EPHEDRINE SULFATE 50 MG/ML
INJECTION INTRAVENOUS AS NEEDED
Status: DISCONTINUED | OUTPATIENT
Start: 2023-06-15 | End: 2023-06-15 | Stop reason: SURG

## 2023-06-15 RX ORDER — FENTANYL CITRATE 50 UG/ML
INJECTION, SOLUTION INTRAMUSCULAR; INTRAVENOUS AS NEEDED
Status: DISCONTINUED | OUTPATIENT
Start: 2023-06-15 | End: 2023-06-15 | Stop reason: SURG

## 2023-06-15 RX ORDER — PROMETHAZINE HYDROCHLORIDE 25 MG/1
25 SUPPOSITORY RECTAL ONCE AS NEEDED
Status: DISCONTINUED | OUTPATIENT
Start: 2023-06-15 | End: 2023-06-15 | Stop reason: HOSPADM

## 2023-06-15 RX ORDER — DOXYCYCLINE 100 MG/1
200 CAPSULE ORAL ONCE
Status: COMPLETED | OUTPATIENT
Start: 2023-06-15 | End: 2023-06-15

## 2023-06-15 RX ORDER — HYDRALAZINE HYDROCHLORIDE 20 MG/ML
5 INJECTION INTRAMUSCULAR; INTRAVENOUS
Status: DISCONTINUED | OUTPATIENT
Start: 2023-06-15 | End: 2023-06-15 | Stop reason: HOSPADM

## 2023-06-15 RX ADMIN — ONDANSETRON HYDROCHLORIDE 8 MG: 8 TABLET, FILM COATED ORAL at 05:56

## 2023-06-15 RX ADMIN — ACETAMINOPHEN 1000 MG: 500 TABLET ORAL at 05:56

## 2023-06-15 RX ADMIN — PROPOFOL 150 MG: 10 INJECTION, EMULSION INTRAVENOUS at 07:08

## 2023-06-15 RX ADMIN — GABAPENTIN 300 MG: 300 CAPSULE ORAL at 06:49

## 2023-06-15 RX ADMIN — CELECOXIB 400 MG: 200 CAPSULE ORAL at 05:56

## 2023-06-15 RX ADMIN — PROPOFOL 25 MCG/KG/MIN: 10 INJECTION, EMULSION INTRAVENOUS at 07:17

## 2023-06-15 RX ADMIN — FAMOTIDINE 20 MG: 10 INJECTION INTRAVENOUS at 06:18

## 2023-06-15 RX ADMIN — SODIUM CHLORIDE, POTASSIUM CHLORIDE, SODIUM LACTATE AND CALCIUM CHLORIDE: 600; 310; 30; 20 INJECTION, SOLUTION INTRAVENOUS at 09:55

## 2023-06-15 RX ADMIN — MIDAZOLAM 1 MG: 1 INJECTION INTRAMUSCULAR; INTRAVENOUS at 06:51

## 2023-06-15 RX ADMIN — OXYCODONE HYDROCHLORIDE AND ACETAMINOPHEN 1 TABLET: 7.5; 325 TABLET ORAL at 10:56

## 2023-06-15 RX ADMIN — FENTANYL CITRATE 50 MCG: 50 INJECTION, SOLUTION INTRAMUSCULAR; INTRAVENOUS at 09:38

## 2023-06-15 RX ADMIN — DOXYCYCLINE 200 MG: 100 CAPSULE ORAL at 06:49

## 2023-06-15 RX ADMIN — EPHEDRINE SULFATE 10 MG: 50 INJECTION INTRAVENOUS at 07:15

## 2023-06-15 RX ADMIN — LIDOCAINE HYDROCHLORIDE 60 MG: 20 INJECTION, SOLUTION INFILTRATION; PERINEURAL at 07:08

## 2023-06-15 RX ADMIN — EPHEDRINE SULFATE 10 MG: 50 INJECTION INTRAVENOUS at 07:09

## 2023-06-15 RX ADMIN — ROCURONIUM BROMIDE 50 MG: 10 INJECTION, SOLUTION INTRAVENOUS at 07:08

## 2023-06-15 RX ADMIN — SUGAMMADEX 200 MG: 100 INJECTION, SOLUTION INTRAVENOUS at 09:51

## 2023-06-15 RX ADMIN — OXYCODONE HYDROCHLORIDE 10 MG: 5 TABLET ORAL at 06:49

## 2023-06-15 RX ADMIN — FENTANYL CITRATE 50 MCG: 50 INJECTION, SOLUTION INTRAMUSCULAR; INTRAVENOUS at 07:08

## 2023-06-15 RX ADMIN — HYDROMORPHONE HYDROCHLORIDE 0.5 MG: 1 INJECTION, SOLUTION INTRAMUSCULAR; INTRAVENOUS; SUBCUTANEOUS at 10:19

## 2023-06-15 RX ADMIN — SODIUM CHLORIDE, POTASSIUM CHLORIDE, SODIUM LACTATE AND CALCIUM CHLORIDE 125 ML/HR: 600; 310; 30; 20 INJECTION, SOLUTION INTRAVENOUS at 06:19

## 2023-06-15 RX ADMIN — ONDANSETRON 4 MG: 2 INJECTION INTRAMUSCULAR; INTRAVENOUS at 09:52

## 2023-06-15 NOTE — ANESTHESIA PREPROCEDURE EVALUATION
Anesthesia Evaluation     NPO Solid Status: > 8 hours             Airway   Mallampati: II  TM distance: >3 FB  Neck ROM: full  Possible difficult intubation  Comment: 2a view previously  Dental      Pulmonary    (-) wheezes  Cardiovascular     ECG reviewed  Rhythm: regular    (+) hypertension, hyperlipidemia,   (-) murmur      Neuro/Psych  GI/Hepatic/Renal/Endo      Musculoskeletal     Abdominal    Substance History      OB/GYN          Other                        Anesthesia Plan    ASA 2     general     (  D/W R&B of GA including but not limited to: heart, lung, liver, kidney, neurologic problems, positioning injuries, dental damage, corneal abrasion and TMJ.  .)  intravenous induction     Anesthetic plan, risks, benefits, and alternatives have been provided, discussed and informed consent has been obtained with: patient.        CODE STATUS:

## 2023-06-15 NOTE — OP NOTE
Pre-Operative Diagnosis: Acquired absence of bilateral breast    Post-Operative Diagnosis: Same    Procedure Performed:   1.  Bilateral breast placement of permanent breast prosthesis ([id number] HP)  2.  Bilateral placement AlloDerm acellular dermal matrix (8 x 16 cm on each side)  3.  Right MediPort removal    Surgeon: SHANTEL Agustin MD    Assistant: Vinny Ortez MS-2    Anesthesia: General    Estimated Blood Loss:  20    Specimens:  Left and right mastectomy skin    Complications: None    Indications: She is completed healing after her mastectomy and just mastectomy closure and is ready to proceed with delayed reconstruction.  She does have quite a good skin envelope and good definition to the fold remaining after the mastectomy so we discussed a delayed direct implant placement for reconstruction.  We did discuss that it was likely to need ADM to reinforce the lower pole but if she had an adequate seroma capsule that we could potentially avoid the use of ADM.    We discussed risks, benefits and alternatives including but not limited to: bleeding, infection, asymmetry, poor or slow wound healing, need for further surgery, possible recurrence.  The patient elected to proceed.    Description of Procedure: The patient was met in the preoperative holding area.  All questions were answered and informed consent was assured.      Breast landmarks were drawn and she was transferred to the operating room.    After induction of appropriate anesthesia, a timeout was performed correctly identifying the patient, operative site, and procedure to be performed.  All present were in agreement.    Local anesthetic was infiltrated in the chest was prepped and draped in a sterile fashion.  On both sides the mastectomy incisions were excised and passed off the field and dissection carried down through the mastectomy skin to the chest wall.  The mastectomy skin was elevated in the desired footprint for implant placement.  The  underside of the mastectomy flaps were scored to release some of the scar and create soft pocket.  There was no seroma capsule on each side.  Sizers were placed in the pocket and the breast tailor tacked and there is fairly good symmetry, the right side the superior flap was quite thicker than the left side but not enough to require different size implant.  Sizers were removed and the pockets copiously irrigated and immaculate hemostasis achieved.  The overlap from the tailor tacking was marked and de-epithelialized.  At this point the port site on the right side was able to be accessed through the mastectomy pocket fairly easily so she was placed in Trendelenburg position and the port capsule incised.  Cerclage suture placed around the lumen and then given positive pressure ventilation and the catheter removed and pressure held for 5 minutes and there is no bleeding in the the cerclage suture was tied down.  Both pockets were irrigated with 50's Betadine solution and then drains placed.  The AlloDerm was then brought onto the field after changing gloves and additional fenestrations were made it was sutured along the fold with a running 2-0 PDS suture on both sides and then the implants placed into the pocket and the ADM draped anteriorly to the implant and anchored to the superior flap.  The closure was then performed with 2-0 Vicryl closing the de-epithelialized portion of the inferior flap to the underside of the superior flap and then 3 Monocryl deep dermal layer and 4 Monocryl in the intracuticular.  She was placed in a well-padded Ace wrap.    The patient was then aroused from anesthesia with ease and transferred to the postoperative care area in good condition. All sponge, needle, and instrument counts were correct.

## 2023-06-15 NOTE — ANESTHESIA POSTPROCEDURE EVALUATION
"Patient: Bebe Kiser    Procedure Summary       Date: 06/15/23 Room / Location: [facility] OR 02 / [facility] MAIN OR    Anesthesia Start: 0655 Anesthesia Stop: 1019    Procedure: BILATERAL DELAYED IMPLANT PLACEMENT WITH POSSIBLE ALLODERM AND REMOVE MEDIPORT (Bilateral: Breast) Diagnosis:     Surgeons: Kevan Agustin MD Provider: Render, Jr Lind MD    Anesthesia Type: general ASA Status: 2            Anesthesia Type: general    Vitals  Vitals Value Taken Time   /72 06/15/23 1102   Temp 36.3 °C (97.4 °F) 06/15/23 1015   Pulse 70 06/15/23 1112   Resp 16 06/15/23 1102   SpO2 96 % 06/15/23 1112   Vitals shown include unvalidated device data.        Post Anesthesia Care and Evaluation    Patient location during evaluation: PACU  Patient participation: complete - patient participated  Level of consciousness: awake and alert  Pain management: adequate    Airway patency: patent  Anesthetic complications: No anesthetic complications  PONV Status: controlled  Cardiovascular status: acceptable and hemodynamically stable  Respiratory status: acceptable  Hydration status: acceptable    Comments: /72 (BP Location: Right arm, Patient Position: Lying)   Pulse 71   Temp 36.3 °C (97.4 °F) (Oral)   Resp 16   Ht 162.6 cm (64\")   Wt 89.8 kg (198 lb)   LMP  (LMP Unknown)   SpO2 95%   BMI 33.99 kg/m²     "

## 2023-06-15 NOTE — ANESTHESIA PROCEDURE NOTES
Airway  Urgency: elective    Date/Time: 6/15/2023 7:10 AM  Airway not difficult    General Information and Staff    Patient location during procedure: OR  Anesthesiologist: Jr Gonsalez MD  CRNA/CAA: Lani Guadarrama CRNA    Indications and Patient Condition  Indications for airway management: airway protection    Preoxygenated: yes  MILS not maintained throughout  Mask difficulty assessment: 1 - vent by mask    Final Airway Details  Final airway type: endotracheal airway      Successful airway: ETT  Cuffed: yes   Successful intubation technique: direct laryngoscopy  Facilitating devices/methods: intubating stylet  Endotracheal tube insertion site: oral  Blade: Rangel  Blade size: 3  ETT size (mm): 7.0  Cormack-Lehane Classification: grade I - full view of glottis  Placement verified by: chest auscultation   Cuff volume (mL): 8  Measured from: lips  Number of attempts at approach: 1  Assessment: lips, teeth, and gum same as pre-op and atraumatic intubation    Additional Comments  PreO2 100% face mask, IV induction, easy mask, DVL x1, cords noted, tube through, cuff up, EBBSH, +etCO2, = chest movement, tube secured in place, atraumatic, teeth and lips intact as preop.

## 2023-06-16 LAB
LAB AP CASE REPORT: NORMAL
PATH REPORT.FINAL DX SPEC: NORMAL
PATH REPORT.GROSS SPEC: NORMAL

## 2023-08-02 ENCOUNTER — OFFICE VISIT (OUTPATIENT)
Dept: SURGERY | Facility: CLINIC | Age: 58
End: 2023-08-02
Payer: COMMERCIAL

## 2023-08-02 VITALS
OXYGEN SATURATION: 97 % | HEART RATE: 71 BPM | RESPIRATION RATE: 16 BRPM | DIASTOLIC BLOOD PRESSURE: 78 MMHG | WEIGHT: 205 LBS | SYSTOLIC BLOOD PRESSURE: 124 MMHG | HEIGHT: 64 IN | BODY MASS INDEX: 35 KG/M2

## 2023-08-02 DIAGNOSIS — Z17.1 MALIGNANT NEOPLASM OF UPPER-INNER QUADRANT OF LEFT BREAST IN FEMALE, ESTROGEN RECEPTOR NEGATIVE: Primary | ICD-10-CM

## 2023-08-02 DIAGNOSIS — C50.212 MALIGNANT NEOPLASM OF UPPER-INNER QUADRANT OF LEFT BREAST IN FEMALE, ESTROGEN RECEPTOR NEGATIVE: Primary | ICD-10-CM

## 2023-08-02 PROCEDURE — 99212 OFFICE O/P EST SF 10 MIN: CPT | Performed by: SURGERY

## 2023-08-09 ENCOUNTER — OFFICE VISIT (OUTPATIENT)
Dept: OBSTETRICS AND GYNECOLOGY | Age: 58
End: 2023-08-09
Payer: COMMERCIAL

## 2023-08-09 VITALS
WEIGHT: 201.6 LBS | DIASTOLIC BLOOD PRESSURE: 80 MMHG | SYSTOLIC BLOOD PRESSURE: 124 MMHG | HEIGHT: 64 IN | BODY MASS INDEX: 34.42 KG/M2

## 2023-08-09 DIAGNOSIS — Z01.419 WELL WOMAN EXAM WITH ROUTINE GYNECOLOGICAL EXAM: Primary | ICD-10-CM

## 2023-08-09 DIAGNOSIS — Z15.09 MONOALLELIC MUTATION OF PALB2 GENE: ICD-10-CM

## 2023-08-09 DIAGNOSIS — Z15.89 MONOALLELIC MUTATION OF PALB2 GENE: ICD-10-CM

## 2023-08-09 DIAGNOSIS — Z15.01 MONOALLELIC MUTATION OF PALB2 GENE: ICD-10-CM

## 2023-08-09 DIAGNOSIS — Z12.11 SCREEN FOR COLON CANCER: ICD-10-CM

## 2023-08-09 PROBLEM — R51.9 ACUTE HEADACHE: Status: RESOLVED | Noted: 2023-06-06 | Resolved: 2023-08-09

## 2023-08-09 PROBLEM — R42 DIZZINESS: Status: RESOLVED | Noted: 2023-06-06 | Resolved: 2023-08-09

## 2023-08-09 PROBLEM — R42 LIGHTHEADEDNESS: Status: RESOLVED | Noted: 2023-06-06 | Resolved: 2023-08-09

## 2023-08-09 PROCEDURE — 99396 PREV VISIT EST AGE 40-64: CPT | Performed by: STUDENT IN AN ORGANIZED HEALTH CARE EDUCATION/TRAINING PROGRAM

## 2023-08-09 NOTE — PROGRESS NOTES
[facility]   Obstetrics and Gynecology   Routine Annual Visit    2023    Patient: Bebe Kiser          MR#:[id number]    History of Present Illness    Chief Complaint   Patient presents with    Annual Exam     AE today, Last AE 2022 w/pap nml and  HPV neg, Colonoscopy @ age 48 nml, Hx bilateral mastectomy 10/2022, No problems today       58 y.o. female  who presents for annual exam.  Diagnosed with breast cancer this year and has since completed treatment.  Underwent bilateral mastectomy with later implant placement in .  Doing well since surgery    -Left breast cancer: High grade, triple negative, invasive ductal carcinoma. She underwent bilateral mastectomy, left SLNB, and port placement on 10/18/22, pT1bN0, anatomic stage IA, prognostic stage IB.  She completed adjuvant TC x4 on 2023.   -PALB2 mutation, interested in risk reducing BSO    Studies reviewed:  IGP, Apt HPV,rfx 16 / 18,45 (2022 10:59)   Colonoscopy at 47yo normal, needs repeat    Obstetric History:  OB History          2    Para   2    Term   2            AB        Living   2         SAB        IAB        Ectopic        Molar        Multiple        Live Births   2               Menstrual History:     No LMP recorded (lmp unknown). Patient is postmenopausal.       Sexual History:   Not sexually active currently, no issues, declines STD testing       Social History:   Pre-K teacher  Also   Adopted one son    ________________________________________  Patient Active Problem List   Diagnosis    Hypertension    Hypertrophic polyarthritis    Neuralgia neuritis, sciatic nerve    Vitamin D deficiency    High risk medication use    Malignant neoplasm of upper-inner quadrant of left breast in female, estrogen receptor negative    Monoallelic mutation of PALB2 gene    Encounter for fitting and adjustment of vascular catheter    Blurred vision, bilateral     Past Medical History:    Diagnosis Date    Breast cancer     LEFT    Elevated cholesterol     History of chemotherapy     Hypertension     Monoallelic mutation of PALB2 gene      Past Surgical History:   Procedure Laterality Date    BREAST SURGERY Left 10/18/2022    BIOPSY    BREAST TISSUE EXPANDER REMOVAL INSERTION OF IMPLANT Bilateral 6/15/2023    Procedure: BILATERAL DELAYED IMPLANT PLACEMENT WITH POSSIBLE ALLODERM AND REMOVE MEDIPORT;  Surgeon: Kevan Agustin MD;  Location: [facility];  Service: Plastics;  Laterality: Bilateral;    CATARACT EXTRACTION Bilateral 2019    COLONOSCOPY      2013    MASTECTOMY WITH SENTINEL NODE BIOPSY AND AXILLARY NODE DISSECTION Bilateral 10/18/2022    Procedure: bilateral skin-sparing mastectomy and left sentinel lymph node biopsy;  Surgeon: Manisha Holley MD;  Location: [facility];  Service: General;  Laterality: Bilateral;    VENOUS ACCESS DEVICE (PORT) INSERTION Right 10/18/2022    Procedure: Port placement;  Surgeon: Manisha Holley MD;  Location: [facility];  Service: General;  Laterality: Right;     Social History     Tobacco Use   Smoking Status Never    Passive exposure: Never   Smokeless Tobacco Never     Family History   Problem Relation Age of Onset    Hypotension Mother     Asthma Mother     No Known Problems Father     Hypertension Brother     Breast cancer Neg Hx     Ovarian cancer Neg Hx     Uterine cancer Neg Hx     Colon cancer Neg Hx     Malig Hyperthermia Neg Hx      Prior to Admission medications    Medication Sig Start Date End Date Taking? Authorizing Provider   carvedilol (Coreg) 25 MG tablet Take 1 tablet by mouth 2 (Two) Times a Day With Meals. 6/6/23  Yes Ysabel Coles APRN   spironolactone (ALDACTONE) 25 MG tablet Take 1 tablet by mouth Daily. 4/15/23  Yes Ysabel Coles APRN   VITAMIN D PO Take 1 tablet by mouth Daily.   Yes Provider, MD Anya     ________________________________________    Current contraception: post menopausal  "status  History of abnormal Pap smear: no  Family history of uterine or ovarian cancer: no  Family History of colon cancer/colon polyps: no  History of abnormal mammogram: yes - h/o breast cancer    The following portions of the patient's history were reviewed and updated as appropriate: allergies, current medications, past family history, past medical history, past social history, past surgical history, and problem list.    Review of Systems   All other systems reviewed and are negative.         Objective     /80   Ht 162.6 cm (64\")   Wt 91.4 kg (201 lb 9.6 oz)   LMP  (LMP Unknown)   Breastfeeding No   BMI 34.60 kg/mý    BP Readings from Last 3 Encounters:   08/09/23 124/80   08/02/23 124/78   07/14/23 120/83      Wt Readings from Last 3 Encounters:   08/09/23 91.4 kg (201 lb 9.6 oz)   08/02/23 93 kg (205 lb)   07/14/23 91.7 kg (202 lb 1.6 oz)        BMI: Estimated body mass index is 34.6 kg/mý as calculated from the following:    Height as of this encounter: 162.6 cm (64\").    Weight as of this encounter: 91.4 kg (201 lb 9.6 oz).    Physical Exam  Vitals and nursing note reviewed.   Constitutional:       General: She is not in acute distress.     Appearance: Normal appearance.   HENT:      Head: Normocephalic and atraumatic.   Eyes:      Extraocular Movements: Extraocular movements intact.   Cardiovascular:      Rate and Rhythm: Normal rate and regular rhythm.      Pulses: Normal pulses.      Heart sounds: No murmur heard.  Pulmonary:      Effort: Pulmonary effort is normal. No respiratory distress.      Breath sounds: Normal breath sounds.   Chest:   Breasts:     Right: Normal. No mass, nipple discharge, skin change or tenderness.      Left: Normal. No mass, nipple discharge, skin change or tenderness.   Abdominal:      General: There is no distension.      Palpations: Abdomen is soft. There is no mass.      Tenderness: There is no abdominal tenderness.   Genitourinary:     General: Normal vulva.      " Labia:         Right: No rash or lesion.         Left: No rash or lesion.       Urethra: No prolapse, urethral swelling or urethral lesion.      Vagina: Normal.      Cervix: Normal.      Uterus: Normal.       Adnexa: Right adnexa normal and left adnexa normal.      Comments: Bladder: no masses or tenderness  Perineum/Anus: no masses, lesions, or skin changes  Musculoskeletal:         General: No swelling. Normal range of motion.      Cervical back: Normal range of motion.   Lymphadenopathy:      Upper Body:      Right upper body: No axillary adenopathy.      Left upper body: No axillary adenopathy.   Skin:     General: Skin is warm and dry.   Neurological:      General: No focal deficit present.      Mental Status: She is alert and oriented to person, place, and time.   Psychiatric:         Mood and Affect: Mood normal.         Behavior: Behavior normal.       As part of wellness and prevention, the following topics were discussed with the patient:  Encouraged self breast exam  Physical activity and regular exercised encouraged.   Injury prevention discussed.  Healthy weight discussed.  Nutrition discussed.  Substance abuse/misuse discussed.  Sexual behavior/safe practices discussed.   Sexual transmitted disease prevention   Mental health discussed.           Assessment:  Diagnoses and all orders for this visit:    1. Well woman exam with routine gynecological exam (Primary)  -     Ambulatory Referral For Screening Colonoscopy    2. Monoallelic mutation of PALB2 gene    3. Screen for colon cancer  -     Ambulatory Referral For Screening Colonoscopy    -Breast healing well, will continue screening with Dr. Holley (breast surgeon)  -Pelvic exam normal  - Pap up-to-date  - Colonoscopy ordered  - Declined STD screening  - Patient is interested in risk reducing BSO for PALB2 mutation.  Plan for GYN ultrasound to confirm normalcy and will schedule surgery.  Briefly discussed details today but will need to go through whole  consenting process.  Will also discuss importance of her children pursue genetic testing.      Plan:  Return for Next f/u with gyn Us.      Tamika Sweeney MD  8/9/2023 10:13 EDT

## 2023-08-10 ENCOUNTER — OFFICE VISIT (OUTPATIENT)
Dept: OBSTETRICS AND GYNECOLOGY | Age: 58
End: 2023-08-10
Payer: COMMERCIAL

## 2023-08-10 VITALS
WEIGHT: 201 LBS | DIASTOLIC BLOOD PRESSURE: 78 MMHG | SYSTOLIC BLOOD PRESSURE: 122 MMHG | BODY MASS INDEX: 34.31 KG/M2 | HEIGHT: 64 IN

## 2023-08-10 DIAGNOSIS — Z15.89 MONOALLELIC MUTATION OF PALB2 GENE: ICD-10-CM

## 2023-08-10 DIAGNOSIS — Z15.09 MONOALLELIC MUTATION OF PALB2 GENE: ICD-10-CM

## 2023-08-10 DIAGNOSIS — Z15.01 MONOALLELIC MUTATION OF PALB2 GENE: ICD-10-CM

## 2023-08-10 PROCEDURE — 99214 OFFICE O/P EST MOD 30 MIN: CPT | Performed by: STUDENT IN AN ORGANIZED HEALTH CARE EDUCATION/TRAINING PROGRAM

## 2023-08-10 RX ORDER — SODIUM CHLORIDE 9 MG/ML
40 INJECTION, SOLUTION INTRAVENOUS AS NEEDED
OUTPATIENT
Start: 2023-08-10

## 2023-08-10 RX ORDER — SODIUM CHLORIDE 0.9 % (FLUSH) 0.9 %
3-10 SYRINGE (ML) INJECTION AS NEEDED
OUTPATIENT
Start: 2023-08-10

## 2023-08-10 RX ORDER — SODIUM CHLORIDE 0.9 % (FLUSH) 0.9 %
3 SYRINGE (ML) INJECTION EVERY 12 HOURS SCHEDULED
OUTPATIENT
Start: 2023-08-10

## 2023-08-10 NOTE — PROGRESS NOTES
[facility]   Obstetrics and Gynecology     8/10/2023      Patient:  Bebe Kiser   MR#:[id number]    Office note    Chief Complaint   Patient presents with    Follow-up     Pelvic U/S today       Subjective     History of Present Illness  58 y.o. female  presents to discuss risk reducing BSO for recently identified PALB2 mutation.  Pelvic ultrasound today normal.    Of note, daughter also tested positive.      Relevant data reviewed:  US Non-ob Transvaginal (08/10/2023 09:03)     Patient Active Problem List   Diagnosis    Hypertension    Hypertrophic polyarthritis    Neuralgia neuritis, sciatic nerve    Vitamin D deficiency    High risk medication use    Malignant neoplasm of upper-inner quadrant of left breast in female, estrogen receptor negative    Monoallelic mutation of PALB2 gene    Encounter for fitting and adjustment of vascular catheter    Blurred vision, bilateral       Past Medical History:   Diagnosis Date    Breast cancer     LEFT    Elevated cholesterol     History of chemotherapy     Hypertension     Monoallelic mutation of PALB2 gene      Past Surgical History:   Procedure Laterality Date    BREAST SURGERY Left 10/18/2022    BIOPSY    BREAST TISSUE EXPANDER REMOVAL INSERTION OF IMPLANT Bilateral 6/15/2023    Procedure: BILATERAL DELAYED IMPLANT PLACEMENT WITH POSSIBLE ALLODERM AND REMOVE MEDIPORT;  Surgeon: Kevan Agustin MD;  Location: [facility];  Service: Plastics;  Laterality: Bilateral;    CATARACT EXTRACTION Bilateral     COLONOSCOPY          MASTECTOMY WITH SENTINEL NODE BIOPSY AND AXILLARY NODE DISSECTION Bilateral 10/18/2022    Procedure: bilateral skin-sparing mastectomy and left sentinel lymph node biopsy;  Surgeon: Manisha Holley MD;  Location: [facility];  Service: General;  Laterality: Bilateral;    VENOUS ACCESS DEVICE (PORT) INSERTION Right 10/18/2022    Procedure: Port placement;  Surgeon: Manisha Holley MD;  Location: [facility]  MAIN OR;  Service: General;  Laterality: Right;     Obstetric History:  OB History          2    Para   2    Term   2            AB        Living   2         SAB        IAB        Ectopic        Molar        Multiple        Live Births   2               Menstrual History:     No LMP recorded (lmp unknown). Patient is postmenopausal.       # 1 - Date: , Sex: Female, Weight: 3671 g (8 lb 1.5 oz), GA: None, Delivery: Vaginal, Spontaneous, Apgar1: None, Apgar5: None, Living: Living, Birth Comments: None    # 2 - Date: , Sex: Male, Weight: 3714 g (8 lb 3 oz), GA: None, Delivery: Vaginal, Spontaneous, Apgar1: None, Apgar5: None, Living: Living, Birth Comments: None    Family History   Problem Relation Age of Onset    Hypotension Mother     Asthma Mother     No Known Problems Father     Hypertension Brother     Breast cancer Neg Hx     Ovarian cancer Neg Hx     Uterine cancer Neg Hx     Colon cancer Neg Hx     Malig Hyperthermia Neg Hx      Social History     Tobacco Use    Smoking status: Never     Passive exposure: Never    Smokeless tobacco: Never   Vaping Use    Vaping Use: Never used   Substance Use Topics    Alcohol use: Not Currently     Comment: OCCASIONAL    Drug use: No     Patient has no known allergies.    Current Outpatient Medications:     carvedilol (Coreg) 25 MG tablet, Take 1 tablet by mouth 2 (Two) Times a Day With Meals., Disp: 180 tablet, Rfl: 0    spironolactone (ALDACTONE) 25 MG tablet, Take 1 tablet by mouth Daily., Disp: 90 tablet, Rfl: 0    VITAMIN D PO, Take 1 tablet by mouth Daily., Disp: , Rfl:     The following portions of the patient's history were reviewed and updated as appropriate: allergies, current medications, past family history, past medical history, past social history, past surgical history, and problem list.    Review of Systems   All other systems reviewed and are negative.    BP Readings from Last 3 Encounters:   08/10/23 122/78   23 124/80   23  "124/78      Wt Readings from Last 3 Encounters:   08/10/23 91.2 kg (201 lb)   08/09/23 91.4 kg (201 lb 9.6 oz)   08/02/23 93 kg (205 lb)      BMI: Estimated body mass index is 34.5 kg/mý as calculated from the following:    Height as of this encounter: 162.6 cm (64\").    Weight as of this encounter: 91.2 kg (201 lb). BSA: Estimated body surface area is 1.96 meters squared as calculated from the following:    Height as of this encounter: 162.6 cm (64\").    Weight as of this encounter: 91.2 kg (201 lb).    Objective   Physical Exam  Vitals and nursing note reviewed.   Constitutional:       General: She is not in acute distress.     Appearance: Normal appearance.   Pulmonary:      Effort: Pulmonary effort is normal. No respiratory distress.   Neurological:      General: No focal deficit present.      Mental Status: She is alert and oriented to person, place, and time.   Psychiatric:         Mood and Affect: Mood normal.         Behavior: Behavior normal.         Thought Content: Thought content normal.         Judgment: Judgment normal.       Assessment & Plan     Diagnoses and all orders for this visit:    1. Monoallelic mutation of PALB2 gene  -     US Non-ob Transvaginal  -     Case Request; Standing  -     sodium chloride 0.9 % flush 3 mL  -     sodium chloride 0.9 % flush 3-10 mL  -     sodium chloride 0.9 % infusion 40 mL  -     Case Request    Other orders  -     Outpatient In A Bed; Standing  -     Follow Anesthesia Guidelines / Protocol; Future  -     Follow Anesthesia Guidelines / Protocol; Standing  -     Verify / Perform Chlorhexidine Skin Prep; Standing  -     Verify / Perform Chlorhexidine Skin Prep if Indicated (If Not Already Completed); Standing  -     Obtain Informed Consent; Future  -     Provide NPO Instructions to Patient; Future  -     Chlorhexidine Skin Prep; Future  -     Insert Peripheral IV; Standing  -     Saline Lock & Maintain IV Access; Standing      -Discussed risk reducing robotic " bilateral salpingo-oophorectomy with da Alvin assist.  Patient would like to proceed with surgery.  Discussed risks of procedure including pain, bleeding, infection, damage to surrounding structures.  -Patient will likely want surgery in the next month or two.  Mondays okay.  -H/o HTN well-controlled with one agent, no need for cardiac clearance    Tamika Sweeney MD   8/10/2023 09:22 EDT

## 2023-09-13 ENCOUNTER — PRE-ADMISSION TESTING (OUTPATIENT)
Dept: PREADMISSION TESTING | Facility: HOSPITAL | Age: 58
End: 2023-09-13
Payer: COMMERCIAL

## 2023-09-13 VITALS
DIASTOLIC BLOOD PRESSURE: 70 MMHG | SYSTOLIC BLOOD PRESSURE: 105 MMHG | HEART RATE: 62 BPM | BODY MASS INDEX: 35.15 KG/M2 | RESPIRATION RATE: 16 BRPM | TEMPERATURE: 97.8 F | HEIGHT: 64 IN | WEIGHT: 205.9 LBS | OXYGEN SATURATION: 97 %

## 2023-09-13 LAB
ANION GAP SERPL CALCULATED.3IONS-SCNC: 10.8 MMOL/L (ref 5–15)
BUN SERPL-MCNC: 19 MG/DL (ref 8–23)
BUN/CREAT SERPL: 23.2 (ref 7–25)
CALCIUM SPEC-SCNC: 9.7 MG/DL (ref 8.6–10.5)
CHLORIDE SERPL-SCNC: 103 MMOL/L (ref 98–107)
CO2 SERPL-SCNC: 25.2 MMOL/L (ref 22–29)
CREAT SERPL-MCNC: 0.82 MG/DL (ref 0.57–1)
DEPRECATED RDW RBC AUTO: 40.6 FL (ref 37–54)
EGFRCR SERPLBLD CKD-EPI 2021: 82 ML/MIN/1.73
ERYTHROCYTE [DISTWIDTH] IN BLOOD BY AUTOMATED COUNT: 11.9 % (ref 12.3–15.4)
GLUCOSE SERPL-MCNC: 107 MG/DL (ref 65–99)
HCT VFR BLD AUTO: 36.2 % (ref 34–46.6)
HGB BLD-MCNC: 12 G/DL (ref 12–15.9)
MCH RBC QN AUTO: 30.8 PG (ref 26.6–33)
MCHC RBC AUTO-ENTMCNC: 33.1 G/DL (ref 31.5–35.7)
MCV RBC AUTO: 92.8 FL (ref 79–97)
PLATELET # BLD AUTO: 253 10*3/MM3 (ref 140–450)
PMV BLD AUTO: 11.1 FL (ref 6–12)
POTASSIUM SERPL-SCNC: 4.5 MMOL/L (ref 3.5–5.2)
QT INTERVAL: 396 MS
QTC INTERVAL: 386 MS
RBC # BLD AUTO: 3.9 10*6/MM3 (ref 3.77–5.28)
SODIUM SERPL-SCNC: 139 MMOL/L (ref 136–145)
WBC NRBC COR # BLD: 7.8 10*3/MM3 (ref 3.4–10.8)

## 2023-09-13 PROCEDURE — 80048 BASIC METABOLIC PNL TOTAL CA: CPT

## 2023-09-13 PROCEDURE — 36415 COLL VENOUS BLD VENIPUNCTURE: CPT

## 2023-09-13 PROCEDURE — 85027 COMPLETE CBC AUTOMATED: CPT

## 2023-09-13 PROCEDURE — 93010 ELECTROCARDIOGRAM REPORT: CPT | Performed by: INTERNAL MEDICINE

## 2023-09-13 PROCEDURE — 93005 ELECTROCARDIOGRAM TRACING: CPT

## 2023-09-13 RX ORDER — CHLORHEXIDINE GLUCONATE 500 MG/1
1 CLOTH TOPICAL TAKE AS DIRECTED
COMMUNITY
End: 2023-09-15 | Stop reason: HOSPADM

## 2023-09-13 NOTE — DISCHARGE INSTRUCTIONS
Take the following medications the morning of surgery: COREG    Time of arrival: 7AM    If you are on prescription narcotic pain medication to control your pain you may also take that medication the morning of surgery.    General Instructions:  Do not eat solid food after midnight the night before surgery.  You may drink clear liquids day of surgery but must stop at least one hour before your hospital arrival time.  It is beneficial for you to have a clear drink that contains carbohydrates the day of surgery.  We suggest a 12 to 20 ounce bottle of Gatorade or Powerade for non-diabetic patients or a 12 to 20 ounce bottle of G2 or Powerade Zero for diabetic patients. (Pediatric patients, are not advised to drink a 12 to 20 ounce carbohydrate drink)    Clear liquids are liquids you can see through.  Nothing red in color.     Plain water                               Sports drinks  Sodas                                   Gelatin (Jell-O)  Fruit juices without pulp such as white grape juice and apple juice  Popsicles that contain no fruit or yogurt  Tea or coffee (no cream or milk added)  Gatorade / Powerade  G2 / Powerade Zero      Patients who avoid smoking, chewing tobacco and alcohol for 4 weeks prior to surgery have a reduced risk of post-operative complications.  Quit smoking as many days before surgery as you can.  Do not smoke, use chewing tobacco or drink alcohol the day of surgery.   If applicable bring your C-PAP/ BI-PAP machine in with you to preop day of surgery.  Bring any papers given to you in the doctor’s office.  Wear clean comfortable clothes.  Do not wear contact lenses, false eyelashes or make-up.  Bring a case for your glasses.   Bring crutches or walker if applicable.  Remove all piercings.  Leave jewelry and any other valuables at home.  Hair extensions with metal clips must be removed prior to surgery.  The Pre-Admission Testing nurse will instruct you to bring medications if unable to obtain an  accurate list in Pre-Admission Testing.        If you were given a blood bank ID arm band remember to bring it with you the day of surgery.    Preventing a Surgical Site Infection:  For 2 to 3 days before surgery, avoid shaving with a razor because the razor can irritate skin and make it easier to develop an infection.    Any areas of open skin can increase the risk of a post-operative wound infection by allowing bacteria to enter and travel throughout the body.  Notify your surgeon if you have any skin wounds / rashes even if it is not near the expected surgical site.  The area will need assessed to determine if surgery should be delayed until it is healed.  The night prior to surgery shower using a fresh bar of anti-bacterial soap (such as Dial) and clean washcloth.  Sleep in a clean bed with clean clothing.  Do not allow pets to sleep with you.  Shower on the morning of surgery using a fresh bar of anti-bacterial soap (such as Dial) and clean washcloth.  Dry with a clean towel and dress in clean clothing.  Ask your surgeon if you will be receiving antibiotics prior to surgery.  Make sure you, your family, and all healthcare providers clean their hands with soap and water or an alcohol based hand  before caring for you or your wound.  CHLORHEXIDINE CLOTH INSTRUCTIONS  The morning of surgery follow these instructions using the Chlorhexidine cloths you've been given.  These steps reduce bacteria on the body.  Do not use the cloths near your eyes, ears mouth, genitalia or on open wounds.  Throw the cloths away after use but do not try to flush them down a toilet.      Open and remove one cloth at a time from the package.    Leave the cloth unfolded and begin the bathing.  Massage the skin with the cloths using gentle pressure to remove bacteria.  Do not scrub harshly.   Follow the steps below with one 2% CHG cloth per area (6 total cloths).  One cloth for neck, shoulders and chest.  One cloth for both arms,  hands, fingers and underarms (do underarms last).  One cloth for the abdomen followed by groin.  One cloth for right leg and foot including between the toes.  One cloth for left leg and foot including between the toes.  The last cloth is to be used for the back of the neck, back and buttocks.    Allow the CHG to air dry 3 minutes on the skin which will give it time to work and decrease the chance of irritation.  The skin may feel sticky until it is dry.  Do not rinse with water or any other liquid or you will lose the beneficial effects of the CHG.  If mild skin irritation occurs, do rinse the skin to remove the CHG.  Report this to the nurse at time of admission.  Do not apply lotions, creams, ointments, deodorants or perfumes after using the clothes. Dress in clean clothes before coming to the hospital.    Day of surgery:  Your arrival time is approximately two hours before your scheduled surgery time.  Upon arrival, a Pre-op nurse and Anesthesiologist will review your health history, obtain vital signs, and answer questions you may have.  The only belongings needed at this time will be a list of your home medications and if applicable your C-PAP/BI-PAP machine.  A Pre-op nurse will start an IV and you may receive medication in preparation for surgery, including something to help you relax.     Please be aware that surgery does come with discomfort.  We want to make every effort to control your discomfort so please discuss any uncontrolled symptoms with your nurse.   Your doctor will most likely have prescribed pain medications.      If you are going home after surgery you will receive individualized written care instructions before being discharged.  A responsible adult must drive you to and from the hospital on the day of your surgery and stay with you for 24 hours.  Discharge prescriptions can be filled by the hospital pharmacy during regular pharmacy hours.  If you are having surgery late in the day/evening  your prescription may be e-prescribed to your pharmacy.  Please verify your pharmacy hours or chose a 24 hour pharmacy to avoid not having access to your prescription because your pharmacy has closed for the day.    If you are staying overnight following surgery, you will be transported to your hospital room following the recovery period.  [facility] has all private rooms.    If you have any questions please call Pre-Admission Testing at [phone].  Deductibles and co-payments are collected on the day of service. Please be prepared to pay the required co-pay, deductible or deposit on the day of service as defined by your plan.    Call your surgeon immediately if you experience any of the following symptoms:  Sore Throat  Shortness of Breath or difficulty breathing  Cough  Chills  Body soreness or muscle pain  Headache  Fever  New loss of taste or smell  Do not arrive for your surgery ill.  Your procedure will need to be rescheduled to another time.  You will need to call your physician before the day of surgery to avoid any unnecessary exposure to hospital staff as well as other patients.

## 2023-09-14 ENCOUNTER — ANESTHESIA EVENT (OUTPATIENT)
Dept: PERIOP | Facility: HOSPITAL | Age: 58
End: 2023-09-14
Payer: COMMERCIAL

## 2023-09-15 ENCOUNTER — HOSPITAL ENCOUNTER (OUTPATIENT)
Facility: HOSPITAL | Age: 58
Discharge: HOME | End: 2023-09-15
Attending: STUDENT IN AN ORGANIZED HEALTH CARE EDUCATION/TRAINING PROGRAM | Admitting: STUDENT IN AN ORGANIZED HEALTH CARE EDUCATION/TRAINING PROGRAM
Payer: COMMERCIAL

## 2023-09-15 ENCOUNTER — ANESTHESIA (OUTPATIENT)
Dept: PERIOP | Facility: HOSPITAL | Age: 58
End: 2023-09-15
Payer: COMMERCIAL

## 2023-09-15 VITALS
TEMPERATURE: 97.5 F | SYSTOLIC BLOOD PRESSURE: 119 MMHG | DIASTOLIC BLOOD PRESSURE: 77 MMHG | HEART RATE: 57 BPM | OXYGEN SATURATION: 99 % | RESPIRATION RATE: 16 BRPM

## 2023-09-15 DIAGNOSIS — Z98.890 S/P LAPAROSCOPY: Primary | ICD-10-CM

## 2023-09-15 DIAGNOSIS — Z15.01 MONOALLELIC MUTATION OF PALB2 GENE: ICD-10-CM

## 2023-09-15 DIAGNOSIS — Z15.09 MONOALLELIC MUTATION OF PALB2 GENE: ICD-10-CM

## 2023-09-15 DIAGNOSIS — Z15.89 MONOALLELIC MUTATION OF PALB2 GENE: ICD-10-CM

## 2023-09-15 PROCEDURE — 88305 TISSUE EXAM BY PATHOLOGIST: CPT | Performed by: STUDENT IN AN ORGANIZED HEALTH CARE EDUCATION/TRAINING PROGRAM

## 2023-09-15 PROCEDURE — 25010000002 MIDAZOLAM PER 1 MG: Performed by: ANESTHESIOLOGY

## 2023-09-15 PROCEDURE — 25010000002 DEXAMETHASONE SODIUM PHOSPHATE 20 MG/5ML SOLUTION: Performed by: NURSE ANESTHETIST, CERTIFIED REGISTERED

## 2023-09-15 PROCEDURE — 25010000002 PROPOFOL 10 MG/ML EMULSION: Performed by: NURSE ANESTHETIST, CERTIFIED REGISTERED

## 2023-09-15 PROCEDURE — 25010000002 HYDROMORPHONE PER 4 MG: Performed by: NURSE ANESTHETIST, CERTIFIED REGISTERED

## 2023-09-15 PROCEDURE — 25010000002 FENTANYL CITRATE (PF) 50 MCG/ML SOLUTION: Performed by: NURSE ANESTHETIST, CERTIFIED REGISTERED

## 2023-09-15 PROCEDURE — 25010000002 HYDROMORPHONE 1 MG/ML SOLUTION: Performed by: NURSE ANESTHETIST, CERTIFIED REGISTERED

## 2023-09-15 PROCEDURE — 88112 CYTOPATH CELL ENHANCE TECH: CPT | Performed by: STUDENT IN AN ORGANIZED HEALTH CARE EDUCATION/TRAINING PROGRAM

## 2023-09-15 PROCEDURE — 25010000002 SUGAMMADEX 200 MG/2ML SOLUTION: Performed by: NURSE ANESTHETIST, CERTIFIED REGISTERED

## 2023-09-15 PROCEDURE — 25010000002 ONDANSETRON PER 1 MG: Performed by: NURSE ANESTHETIST, CERTIFIED REGISTERED

## 2023-09-15 PROCEDURE — 25010000002 ROPIVACAINE PER 1 MG: Performed by: STUDENT IN AN ORGANIZED HEALTH CARE EDUCATION/TRAINING PROGRAM

## 2023-09-15 PROCEDURE — 25010000002 PHENYLEPHRINE 10 MG/ML SOLUTION: Performed by: NURSE ANESTHETIST, CERTIFIED REGISTERED

## 2023-09-15 RX ORDER — DROPERIDOL 2.5 MG/ML
0.62 INJECTION, SOLUTION INTRAMUSCULAR; INTRAVENOUS
Status: DISCONTINUED | OUTPATIENT
Start: 2023-09-15 | End: 2023-09-15 | Stop reason: HOSPADM

## 2023-09-15 RX ORDER — FENTANYL CITRATE 50 UG/ML
50 INJECTION, SOLUTION INTRAMUSCULAR; INTRAVENOUS ONCE AS NEEDED
Status: DISCONTINUED | OUTPATIENT
Start: 2023-09-15 | End: 2023-09-15 | Stop reason: HOSPADM

## 2023-09-15 RX ORDER — ONDANSETRON 2 MG/ML
INJECTION INTRAMUSCULAR; INTRAVENOUS AS NEEDED
Status: DISCONTINUED | OUTPATIENT
Start: 2023-09-15 | End: 2023-09-15 | Stop reason: SURG

## 2023-09-15 RX ORDER — LIDOCAINE HYDROCHLORIDE 10 MG/ML
0.5 INJECTION, SOLUTION INFILTRATION; PERINEURAL ONCE AS NEEDED
Status: DISCONTINUED | OUTPATIENT
Start: 2023-09-15 | End: 2023-09-15 | Stop reason: HOSPADM

## 2023-09-15 RX ORDER — IBUPROFEN 600 MG/1
600 TABLET ORAL EVERY 6 HOURS
Qty: 60 TABLET | Refills: 1 | Status: SHIPPED | OUTPATIENT
Start: 2023-09-15

## 2023-09-15 RX ORDER — FENTANYL CITRATE 50 UG/ML
50 INJECTION, SOLUTION INTRAMUSCULAR; INTRAVENOUS
Status: DISCONTINUED | OUTPATIENT
Start: 2023-09-15 | End: 2023-09-15 | Stop reason: HOSPADM

## 2023-09-15 RX ORDER — SODIUM CHLORIDE 0.9 % (FLUSH) 0.9 %
3 SYRINGE (ML) INJECTION EVERY 12 HOURS SCHEDULED
Status: DISCONTINUED | OUTPATIENT
Start: 2023-09-15 | End: 2023-09-15 | Stop reason: HOSPADM

## 2023-09-15 RX ORDER — HYDRALAZINE HYDROCHLORIDE 20 MG/ML
5 INJECTION INTRAMUSCULAR; INTRAVENOUS
Status: DISCONTINUED | OUTPATIENT
Start: 2023-09-15 | End: 2023-09-15 | Stop reason: HOSPADM

## 2023-09-15 RX ORDER — ROCURONIUM BROMIDE 10 MG/ML
INJECTION, SOLUTION INTRAVENOUS AS NEEDED
Status: DISCONTINUED | OUTPATIENT
Start: 2023-09-15 | End: 2023-09-15 | Stop reason: SURG

## 2023-09-15 RX ORDER — EPHEDRINE SULFATE 50 MG/ML
5 INJECTION, SOLUTION INTRAVENOUS ONCE AS NEEDED
Status: DISCONTINUED | OUTPATIENT
Start: 2023-09-15 | End: 2023-09-15 | Stop reason: HOSPADM

## 2023-09-15 RX ORDER — FAMOTIDINE 10 MG/ML
20 INJECTION, SOLUTION INTRAVENOUS ONCE
Status: COMPLETED | OUTPATIENT
Start: 2023-09-15 | End: 2023-09-15

## 2023-09-15 RX ORDER — ONDANSETRON 2 MG/ML
4 INJECTION INTRAMUSCULAR; INTRAVENOUS ONCE AS NEEDED
Status: DISCONTINUED | OUTPATIENT
Start: 2023-09-15 | End: 2023-09-15 | Stop reason: HOSPADM

## 2023-09-15 RX ORDER — MAGNESIUM HYDROXIDE 1200 MG/15ML
LIQUID ORAL AS NEEDED
Status: DISCONTINUED | OUTPATIENT
Start: 2023-09-15 | End: 2023-09-15 | Stop reason: HOSPADM

## 2023-09-15 RX ORDER — LABETALOL HYDROCHLORIDE 5 MG/ML
5 INJECTION, SOLUTION INTRAVENOUS
Status: DISCONTINUED | OUTPATIENT
Start: 2023-09-15 | End: 2023-09-15 | Stop reason: HOSPADM

## 2023-09-15 RX ORDER — SENNOSIDES A AND B 8.6 MG/1
1 TABLET, FILM COATED ORAL ONCE
Status: COMPLETED | OUTPATIENT
Start: 2023-09-15 | End: 2023-09-15

## 2023-09-15 RX ORDER — HYDROCODONE BITARTRATE AND ACETAMINOPHEN 7.5; 325 MG/1; MG/1
1 TABLET ORAL ONCE AS NEEDED
Status: DISCONTINUED | OUTPATIENT
Start: 2023-09-15 | End: 2023-09-15 | Stop reason: HOSPADM

## 2023-09-15 RX ORDER — FLUMAZENIL 0.1 MG/ML
0.2 INJECTION INTRAVENOUS AS NEEDED
Status: DISCONTINUED | OUTPATIENT
Start: 2023-09-15 | End: 2023-09-15 | Stop reason: HOSPADM

## 2023-09-15 RX ORDER — DOCUSATE SODIUM 250 MG
250 CAPSULE ORAL 2 TIMES DAILY PRN
Qty: 60 CAPSULE | Refills: 1 | Status: SHIPPED | OUTPATIENT
Start: 2023-09-15

## 2023-09-15 RX ORDER — LIDOCAINE HYDROCHLORIDE 20 MG/ML
INJECTION, SOLUTION INFILTRATION; PERINEURAL AS NEEDED
Status: DISCONTINUED | OUTPATIENT
Start: 2023-09-15 | End: 2023-09-15 | Stop reason: SURG

## 2023-09-15 RX ORDER — GLYCOPYRROLATE 0.2 MG/ML
INJECTION INTRAMUSCULAR; INTRAVENOUS AS NEEDED
Status: DISCONTINUED | OUTPATIENT
Start: 2023-09-15 | End: 2023-09-15 | Stop reason: SURG

## 2023-09-15 RX ORDER — OXYCODONE HYDROCHLORIDE 5 MG/1
5-10 TABLET ORAL EVERY 4 HOURS PRN
Qty: 15 TABLET | Refills: 0 | Status: SHIPPED | OUTPATIENT
Start: 2023-09-15

## 2023-09-15 RX ORDER — NALOXONE HCL 0.4 MG/ML
0.2 VIAL (ML) INJECTION AS NEEDED
Status: DISCONTINUED | OUTPATIENT
Start: 2023-09-15 | End: 2023-09-15 | Stop reason: HOSPADM

## 2023-09-15 RX ORDER — ACETAMINOPHEN 325 MG/1
650 TABLET ORAL EVERY 6 HOURS
Qty: 60 TABLET | Refills: 1 | Status: SHIPPED | OUTPATIENT
Start: 2023-09-15

## 2023-09-15 RX ORDER — OXYCODONE AND ACETAMINOPHEN 7.5; 325 MG/1; MG/1
1 TABLET ORAL ONCE AS NEEDED
Status: DISCONTINUED | OUTPATIENT
Start: 2023-09-15 | End: 2023-09-15 | Stop reason: HOSPADM

## 2023-09-15 RX ORDER — OXYCODONE AND ACETAMINOPHEN 7.5; 325 MG/1; MG/1
1 TABLET ORAL EVERY 4 HOURS PRN
Status: DISCONTINUED | OUTPATIENT
Start: 2023-09-15 | End: 2023-09-15 | Stop reason: HOSPADM

## 2023-09-15 RX ORDER — HYDROMORPHONE HYDROCHLORIDE 1 MG/ML
0.5 INJECTION, SOLUTION INTRAMUSCULAR; INTRAVENOUS; SUBCUTANEOUS
Status: DISCONTINUED | OUTPATIENT
Start: 2023-09-15 | End: 2023-09-15 | Stop reason: HOSPADM

## 2023-09-15 RX ORDER — PHENYLEPHRINE HYDROCHLORIDE 10 MG/ML
INJECTION INTRAVENOUS AS NEEDED
Status: DISCONTINUED | OUTPATIENT
Start: 2023-09-15 | End: 2023-09-15 | Stop reason: SURG

## 2023-09-15 RX ORDER — DEXAMETHASONE SODIUM PHOSPHATE 4 MG/ML
INJECTION, SOLUTION INTRA-ARTICULAR; INTRALESIONAL; INTRAMUSCULAR; INTRAVENOUS; SOFT TISSUE AS NEEDED
Status: DISCONTINUED | OUTPATIENT
Start: 2023-09-15 | End: 2023-09-15 | Stop reason: SURG

## 2023-09-15 RX ORDER — DIPHENHYDRAMINE HYDROCHLORIDE 50 MG/ML
12.5 INJECTION INTRAMUSCULAR; INTRAVENOUS
Status: DISCONTINUED | OUTPATIENT
Start: 2023-09-15 | End: 2023-09-15 | Stop reason: HOSPADM

## 2023-09-15 RX ORDER — PROMETHAZINE HYDROCHLORIDE 25 MG/1
25 SUPPOSITORY RECTAL ONCE AS NEEDED
Status: DISCONTINUED | OUTPATIENT
Start: 2023-09-15 | End: 2023-09-15 | Stop reason: HOSPADM

## 2023-09-15 RX ORDER — SODIUM CHLORIDE 0.9 % (FLUSH) 0.9 %
3-10 SYRINGE (ML) INJECTION AS NEEDED
Status: DISCONTINUED | OUTPATIENT
Start: 2023-09-15 | End: 2023-09-15 | Stop reason: HOSPADM

## 2023-09-15 RX ORDER — SODIUM CHLORIDE 9 MG/ML
40 INJECTION, SOLUTION INTRAVENOUS AS NEEDED
Status: DISCONTINUED | OUTPATIENT
Start: 2023-09-15 | End: 2023-09-15 | Stop reason: HOSPADM

## 2023-09-15 RX ORDER — SODIUM CHLORIDE, SODIUM LACTATE, POTASSIUM CHLORIDE, CALCIUM CHLORIDE 600; 310; 30; 20 MG/100ML; MG/100ML; MG/100ML; MG/100ML
9 INJECTION, SOLUTION INTRAVENOUS CONTINUOUS
Status: DISCONTINUED | OUTPATIENT
Start: 2023-09-15 | End: 2023-09-15 | Stop reason: HOSPADM

## 2023-09-15 RX ORDER — MIDAZOLAM HYDROCHLORIDE 1 MG/ML
1 INJECTION INTRAMUSCULAR; INTRAVENOUS
Status: DISCONTINUED | OUTPATIENT
Start: 2023-09-15 | End: 2023-09-15 | Stop reason: HOSPADM

## 2023-09-15 RX ORDER — ROPIVACAINE HYDROCHLORIDE 5 MG/ML
INJECTION, SOLUTION EPIDURAL; INFILTRATION; PERINEURAL AS NEEDED
Status: DISCONTINUED | OUTPATIENT
Start: 2023-09-15 | End: 2023-09-15 | Stop reason: HOSPADM

## 2023-09-15 RX ORDER — PROPOFOL 10 MG/ML
VIAL (ML) INTRAVENOUS AS NEEDED
Status: DISCONTINUED | OUTPATIENT
Start: 2023-09-15 | End: 2023-09-15 | Stop reason: SURG

## 2023-09-15 RX ORDER — SODIUM CHLORIDE 9 MG/ML
INJECTION, SOLUTION INTRAVENOUS AS NEEDED
Status: DISCONTINUED | OUTPATIENT
Start: 2023-09-15 | End: 2023-09-15 | Stop reason: HOSPADM

## 2023-09-15 RX ORDER — IPRATROPIUM BROMIDE AND ALBUTEROL SULFATE 2.5; .5 MG/3ML; MG/3ML
3 SOLUTION RESPIRATORY (INHALATION) ONCE AS NEEDED
Status: DISCONTINUED | OUTPATIENT
Start: 2023-09-15 | End: 2023-09-15 | Stop reason: HOSPADM

## 2023-09-15 RX ORDER — PROMETHAZINE HYDROCHLORIDE 25 MG/1
25 TABLET ORAL ONCE AS NEEDED
Status: DISCONTINUED | OUTPATIENT
Start: 2023-09-15 | End: 2023-09-15 | Stop reason: HOSPADM

## 2023-09-15 RX ADMIN — LIDOCAINE HYDROCHLORIDE 100 MG: 20 INJECTION, SOLUTION INFILTRATION; PERINEURAL at 09:43

## 2023-09-15 RX ADMIN — PHENYLEPHRINE HYDROCHLORIDE 100 MCG: 10 INJECTION INTRAVENOUS at 10:06

## 2023-09-15 RX ADMIN — OXYCODONE AND ACETAMINOPHEN 1 TABLET: 7.5; 325 TABLET ORAL at 11:26

## 2023-09-15 RX ADMIN — ROCURONIUM BROMIDE 20 MG: 10 INJECTION INTRAVENOUS at 10:12

## 2023-09-15 RX ADMIN — PROPOFOL 200 MG: 10 INJECTION, EMULSION INTRAVENOUS at 09:43

## 2023-09-15 RX ADMIN — HYDROMORPHONE HYDROCHLORIDE 0.5 MG: 1 INJECTION, SOLUTION INTRAMUSCULAR; INTRAVENOUS; SUBCUTANEOUS at 11:22

## 2023-09-15 RX ADMIN — ROCURONIUM BROMIDE 50 MG: 10 INJECTION INTRAVENOUS at 09:43

## 2023-09-15 RX ADMIN — SENNOSIDES 1 TABLET: 8.6 TABLET, FILM COATED ORAL at 11:26

## 2023-09-15 RX ADMIN — GLYCOPYRROLATE 0.2 MG: 1 INJECTION INTRAMUSCULAR; INTRAVENOUS at 09:43

## 2023-09-15 RX ADMIN — MIDAZOLAM 1 MG: 1 INJECTION INTRAMUSCULAR; INTRAVENOUS at 08:31

## 2023-09-15 RX ADMIN — ONDANSETRON 4 MG: 2 INJECTION INTRAMUSCULAR; INTRAVENOUS at 10:29

## 2023-09-15 RX ADMIN — PHENYLEPHRINE HYDROCHLORIDE 100 MCG: 10 INJECTION INTRAVENOUS at 10:03

## 2023-09-15 RX ADMIN — DEXAMETHASONE SODIUM PHOSPHATE 8 MG: 4 INJECTION, SOLUTION INTRAMUSCULAR; INTRAVENOUS at 09:48

## 2023-09-15 RX ADMIN — PHENYLEPHRINE HYDROCHLORIDE 100 MCG: 10 INJECTION INTRAVENOUS at 10:21

## 2023-09-15 RX ADMIN — HYDROMORPHONE HYDROCHLORIDE 0.5 MG: 1 INJECTION, SOLUTION INTRAMUSCULAR; INTRAVENOUS; SUBCUTANEOUS at 10:30

## 2023-09-15 RX ADMIN — PHENYLEPHRINE HYDROCHLORIDE 100 MCG: 10 INJECTION INTRAVENOUS at 09:56

## 2023-09-15 RX ADMIN — SUGAMMADEX 200 MG: 100 INJECTION, SOLUTION INTRAVENOUS at 10:44

## 2023-09-15 RX ADMIN — FAMOTIDINE 20 MG: 10 INJECTION INTRAVENOUS at 08:31

## 2023-09-15 RX ADMIN — SODIUM CHLORIDE, POTASSIUM CHLORIDE, SODIUM LACTATE AND CALCIUM CHLORIDE 9 ML/HR: 600; 310; 30; 20 INJECTION, SOLUTION INTRAVENOUS at 08:31

## 2023-09-15 RX ADMIN — FENTANYL CITRATE 50 MCG: 50 INJECTION, SOLUTION INTRAMUSCULAR; INTRAVENOUS at 11:09

## 2023-09-15 NOTE — OP NOTE
DIAGNOSTIC LAPAROSCOPY WITH DAVINCI ROBOT  Procedure Report    Patient Name:  Bebe Kiser  YOB: 1965    Date of Surgery:  9/15/2023     Indications:  PALB2 mutation    Pre-op Diagnosis:   Monoallelic mutation of PALB2 gene [Z15.01, Z15.89, Z15.09]       Post-Op Diagnosis Codes:     * Monoallelic mutation of PALB2 gene [Z15.01, Z15.89, Z15.09]    Procedure/CPT® Codes:      Procedure(s):  Davinci-assisted bilateral salpingo-oophorectomy with pelvic washings        Staff:  Surgeon(s):  Tamika Sweeney MD         Anesthesia: General    Estimated Blood Loss: minimal    Implants:    Nothing was implanted during the procedure    Specimen:          Specimens       ID Source Type Tests Collected By Collected At Frozen?    A Pelvis Body Fluid NON-GYNECOLOGIC CYTOLOGY   Tamika Sweeney MD 9/15/23 1026 No    Description: pelvic washings    B Ovaries, Bilateral with Fallopian Tubes Tissue TISSUE PATHOLOGY EXAM   Tamika Sweeney MD 9/15/23 1034 No    Description: bilateral fallopian tubes and ovaries    This specimen was not marked as sent.                Findings:   -normal uterus  -normal bilateral fallopian tubes and ovaries  -normal omentum and bowel    Complications: none    Description of Procedure: The patient was taken to the operating room and placed in supine position. General anesthesia was administered.  The surgical time-out was completed for the procedure.  The patient was repositioned into dorsal lithotomy with legs in Deniz stirrups.  Patient's arms were wrapped in protective foam padding and tucked at side. Patient was prepped and draped in the usual sterile fashion. A macias catheter was placed.    A small umbilical incision was made with scalpel.  The Veress needle was placed through the incision.  The abdomen was insufflated with CO2 gas with normal pressures noted while insufflating.  When adequate pneumoperitoneum was achieved, an 8 mm port was inserted through the incision  and the scope immediately thereafter.  Intra-abdominal survey revealed atraumatic entry and findings noted above.  The patient was placed in steep Trendelenburg.  Two additional 8mm ports were placed under direct visualization on each side of the umbilicus.  A final 10mm assistant port was placed in the right lower quadrant.  Intra abdominal survey was performed with findings noted above.  Pelvic washings were collected.  DaVinci robot was docked.    Bowel was manipulated cephalad to expose the pelvis.  Attention was turned to the left ovary.  The left infundibulopelvic ligament was clamped, cauterized, and transected with the vessel sealer.  The utero-ovarian ligament and mesosalpinx were clamped, cauterized, and transected to the level of the cornua, at which point the fallopian tube was clamped, cauterized, and transected as well.  The same procedure was performed on the right adnexa.  Excellent hemostasis was confirmed.  Intact specimens were placed in an Endo Catch bag and removed via the right lower quadrant port.    The port was undocked.  The abdomen was deflated. Valsalva breaths were given by anesthesia to displace all remaining air.  All ports were removed.  Skin incisions were closed with 4-0 Monocryl in a subcuticular fashion.  The patient was awakened and taken to the recovery room in stable condition.  Patient tolerated the procedure well.    Surgical Assistant was responsible for performing the following activities: Retraction, Suction, Irrigation, Suturing, Closing, Placing Dressing and Held/Positioned Camera and their skilled assistance was necessary for the success of this case.    Tamika Sweeney MD     Date: 9/15/2023  Time: 10:46 EDT

## 2023-09-15 NOTE — H&P
[facility]   HISTORY AND PHYSICAL    Patient Name:Bebe Kiser  : 1965  MRN: [id number]  Primary Care Physician: Ysabel Coles APRN  Date of admission: 9/15/2023    Subjective   Subjective     Chief Complaint: PALB2 mutation    History of Present Illness   Bebe iKser is a 58 y.o. female presents with PALB2 mutation.  Pelvic ultrasound normal.  Desires risk reducing BSO.    Review of Systems   All other systems reviewed and are negative.      Personal History     Past Medical History:   Diagnosis Date    At risk for sleep apnea     PATIENT STATES SHE HAS HAD A SLEEP STUDY AND IT WAS NEGATIVE    Breast cancer     LEFT    Elevated cholesterol     History of chemotherapy     Hypertension     Monoallelic mutation of PALB2 gene        Past Surgical History:   Procedure Laterality Date    BREAST SURGERY Left 10/18/2022    BIOPSY    BREAST TISSUE EXPANDER REMOVAL INSERTION OF IMPLANT Bilateral 06/15/2023    Procedure: BILATERAL DELAYED IMPLANT PLACEMENT WITH POSSIBLE ALLODERM AND REMOVE MEDIPORT;  Surgeon: Kevan Agustin MD;  Location: [facility];  Service: Plastics;  Laterality: Bilateral;    CATARACT EXTRACTION Bilateral     COLONOSCOPY          MASTECTOMY WITH SENTINEL NODE BIOPSY AND AXILLARY NODE DISSECTION Bilateral 10/18/2022    Procedure: bilateral skin-sparing mastectomy and left sentinel lymph node biopsy;  Surgeon: Manisha Holley MD;  Location: [facility];  Service: General;  Laterality: Bilateral;    VENOUS ACCESS DEVICE (PORT) INSERTION Right 10/18/2022    Procedure: Port placement;  Surgeon: Manisha Holley MD;  Location: [facility];  Service: General;  Laterality: Right;    VENOUS ACCESS DEVICE (PORT) REMOVAL         Family History: Her family history includes Asthma in her mother; Hypertension in her brother; Hypotension in her mother; No Known Problems in her father.     Social History: She  reports that she has never smoked. She has never been  exposed to tobacco smoke. She has never used smokeless tobacco. She reports that she does not currently use alcohol. She reports that she does not use drugs.    Home Medications:  Chlorhexidine Gluconate Cloth, Vitamin D, carvedilol, and spironolactone    Allergies:  She has No Known Allergies.    Objective    Objective     Vitals:    Temp:  [98.1 °F (36.7 °C)] 98.1 °F (36.7 °C)  Heart Rate:  [68] 68  Resp:  [16] 16  BP: (114)/(85) 114/85    Physical Exam  Vitals and nursing note reviewed.   Constitutional:       General: She is not in acute distress.     Appearance: Normal appearance.   HENT:      Head: Normocephalic and atraumatic.   Eyes:      Extraocular Movements: Extraocular movements intact.   Cardiovascular:      Rate and Rhythm: Normal rate.   Pulmonary:      Effort: Pulmonary effort is normal. No respiratory distress.   Abdominal:      General: There is no distension.      Palpations: Abdomen is soft. There is no mass.      Tenderness: There is no abdominal tenderness.   Musculoskeletal:         General: Normal range of motion.      Cervical back: Normal range of motion.   Skin:     General: Skin is warm and dry.   Neurological:      General: No focal deficit present.      Mental Status: She is alert and oriented to person, place, and time.   Psychiatric:         Mood and Affect: Mood normal.         Behavior: Behavior normal.        Result Review    Result Review:  I have personally reviewed the results from the time of this admission to 9/15/2023 08:53 EDT and agree with these findings:  [x]  Laboratory list / accordion  []  Microbiology  [x]  Radiology  []  EKG/Telemetry   []  Cardiology/Vascular   []  Pathology  []  Old records  []  Other:  Most notable findings include: see above      Assessment & Plan   Assessment / Plan     Brief Patient Summary:  Bebe Kiser is a 58 y.o. female presents with PALB2 mutation    Active Hospital Problems:  Active Hospital Problems    Diagnosis     **Monoallelic  mutation of PALB2 gene      Plan:    TO OR for davinci-assisted robotic bilateral salpingo-oophorectomy and pelvic washings    DVT prophylaxis:  MADISON Sweeney MD

## 2023-09-15 NOTE — ANESTHESIA POSTPROCEDURE EVALUATION
Patient: Bebe Kiser    Procedure Summary       Date: 09/15/23 Room / Location: [facility] OR [address] [place] [address] MAIN OR    Anesthesia Start: 0936 Anesthesia Stop: 1059    Procedure: Davinci-assisted bilateral salpingo-oophorectomy with pelvic washings (Bilateral: Abdomen) Diagnosis:       Monoallelic mutation of PALB2 gene      (Monoallelic mutation of PALB2 gene [Z15.01, Z15.89, Z15.09])    Surgeons: Tamika Sweeney MD Provider: Pepe Woodard MD    Anesthesia Type: general ASA Status: 2            Anesthesia Type: general    Vitals  Vitals Value Taken Time   /76 09/15/23 1115   Temp 36.4 °C (97.5 °F) 09/15/23 1056   Pulse 75 09/15/23 1127   Resp 16 09/15/23 1115   SpO2 93 % 09/15/23 1127   Vitals shown include unvalidated device data.        Post Anesthesia Care and Evaluation    Patient location during evaluation: bedside  Patient participation: complete - patient participated  Level of consciousness: sleepy but conscious  Pain management: adequate    Airway patency: patent  Anesthetic complications: No anesthetic complications    Cardiovascular status: acceptable  Respiratory status: acceptable  Hydration status: acceptable    Comments: */76   Pulse 73   Temp 36.4 °C (97.5 °F) (Oral)   Resp 16   LMP  (LMP Unknown)   SpO2 100%

## 2023-09-15 NOTE — ANESTHESIA PREPROCEDURE EVALUATION
Anesthesia Evaluation     Patient summary reviewed and Nursing notes reviewed   NPO Solid Status: > 8 hours  NPO Liquid Status: > 2 hours           Airway   Mallampati: II  TM distance: >3 FB  Neck ROM: full  no difficulty expected  Dental - normal exam     Pulmonary - normal exam   (-) COPD, asthma, not a smoker, lung cancer  Cardiovascular - normal exam  Exercise tolerance: good (4-7 METS)    Rhythm: regular  Rate: normal    (+) hypertension well controlled less than 2 medications, hyperlipidemia  (-) valvular problems/murmurs, past MI, CAD, dysrhythmias, angina, CHF, cardiac stents, CABG, pericardial effusion      Neuro/Psych  (+) numbness  (-) seizures, TIA, CVA  GI/Hepatic/Renal/Endo    (+) obesity  (-) hiatal hernia, GERD, PUD, hepatitis, liver disease, no renal disease, diabetes, GI bleed, no thyroid disorder    Musculoskeletal     Abdominal  - normal exam   Substance History      OB/GYN          Other      history of cancer remission      Other Comment: Hx/o BRCA                Anesthesia Plan    ASA 2     general     intravenous induction     Anesthetic plan, risks, benefits, and alternatives have been provided, discussed and informed consent has been obtained with: patient.    CODE STATUS:

## 2023-09-15 NOTE — ANESTHESIA PROCEDURE NOTES
Airway  Urgency: elective    Date/Time: 9/15/2023 9:46 AM  Airway not difficult    General Information and Staff    Patient location during procedure: OR  Anesthesiologist: Pepe Woodard MD  CRNA/CAA: Elizabeth Roland CRNA    Indications and Patient Condition  Indications for airway management: airway protection    Preoxygenated: yes  Mask difficulty assessment: 1 - vent by mask    Final Airway Details  Final airway type: endotracheal airway      Successful airway: ETT  Cuffed: yes   Successful intubation technique: direct laryngoscopy  Facilitating devices/methods: intubating stylet  Endotracheal tube insertion site: oral  Blade: Rangel  Blade size: 3  ETT size (mm): 7.0  Cormack-Lehane Classification: grade I - full view of glottis  Placement verified by: chest auscultation and capnometry   Measured from: lips  ETT/EBT  to lips (cm): 21  Number of attempts at approach: 1  Assessment: lips, teeth, and gum same as pre-op and atraumatic intubation

## 2023-09-16 LAB
LAB AP CASE REPORT: NORMAL
PATH REPORT.FINAL DX SPEC: NORMAL
PATH REPORT.GROSS SPEC: NORMAL

## 2023-09-18 LAB
CYTO UR: NORMAL
LAB AP CASE REPORT: NORMAL
PATH REPORT.FINAL DX SPEC: NORMAL
PATH REPORT.GROSS SPEC: NORMAL

## 2023-09-27 ENCOUNTER — OFFICE VISIT (OUTPATIENT)
Dept: OBSTETRICS AND GYNECOLOGY | Age: 58
End: 2023-09-27
Payer: COMMERCIAL

## 2023-09-27 VITALS
BODY MASS INDEX: 35.13 KG/M2 | WEIGHT: 205.8 LBS | DIASTOLIC BLOOD PRESSURE: 74 MMHG | HEIGHT: 64 IN | SYSTOLIC BLOOD PRESSURE: 126 MMHG

## 2023-09-27 DIAGNOSIS — Z09 POSTOPERATIVE FOLLOW-UP: Primary | ICD-10-CM

## 2023-09-27 PROCEDURE — 99024 POSTOP FOLLOW-UP VISIT: CPT | Performed by: STUDENT IN AN ORGANIZED HEALTH CARE EDUCATION/TRAINING PROGRAM

## 2023-09-27 NOTE — PROGRESS NOTES
[facility]   Obstetrics and Gynecology     2023      Patient:  Bebe Kiser   MR#:[id number]    Office note    Chief Complaint   Patient presents with    Post-op Follow-up     Post op salpingo-oophorectomy 09/15/2023, No problems today       Subjective     History of Present Illness  58 y.o. female  s/p robotic BSO with pelvic washings 9/15/23 presents for post-op follow up.  She is doing well.  Denies pain, bleeding, nausea.  Urinating without issue.      Relevant data reviewed:  Non-gynecologic Cytology (09/15/2023 10:26)  Tissue Pathology Exam (09/15/2023 10:34) benign ovaries, fallopian tubes, and pelvic washings    Patient Active Problem List   Diagnosis    Hypertension    Hypertrophic polyarthritis    Neuralgia neuritis, sciatic nerve    Vitamin D deficiency    High risk medication use    Malignant neoplasm of upper-inner quadrant of left breast in female, estrogen receptor negative    Monoallelic mutation of PALB2 gene    Encounter for fitting and adjustment of vascular catheter    Blurred vision, bilateral       Past Medical History:   Diagnosis Date    At risk for sleep apnea     PATIENT STATES SHE HAS HAD A SLEEP STUDY AND IT WAS NEGATIVE    Breast cancer     LEFT    Elevated cholesterol     History of chemotherapy     Hypertension     Monoallelic mutation of PALB2 gene      Past Surgical History:   Procedure Laterality Date    BREAST SURGERY Left 10/18/2022    BIOPSY    BREAST TISSUE EXPANDER REMOVAL INSERTION OF IMPLANT Bilateral 06/15/2023    Procedure: BILATERAL DELAYED IMPLANT PLACEMENT WITH POSSIBLE ALLODERM AND REMOVE MEDIPORT;  Surgeon: Kevan Agustin MD;  Location: [facility];  Service: Plastics;  Laterality: Bilateral;    CATARACT EXTRACTION Bilateral     COLONOSCOPY          DIAGNOSTIC LAPAROSCOPY Bilateral 9/15/2023    Procedure: Davinci-assisted bilateral salpingo-oophorectomy with pelvic washings;  Surgeon: Tamika Sweeney MD;  Location:   [facility];  Service: Robotics - DaVinci;  Laterality: Bilateral;    MASTECTOMY WITH SENTINEL NODE BIOPSY AND AXILLARY NODE DISSECTION Bilateral 10/18/2022    Procedure: bilateral skin-sparing mastectomy and left sentinel lymph node biopsy;  Surgeon: Manisha Holley MD;  Location:  [facility];  Service: General;  Laterality: Bilateral;    VENOUS ACCESS DEVICE (PORT) INSERTION Right 10/18/2022    Procedure: Port placement;  Surgeon: Manisha Holley MD;  Location: [facility] MAIN OR;  Service: General;  Laterality: Right;    VENOUS ACCESS DEVICE (PORT) REMOVAL       Obstetric History:  OB History          2    Para   2    Term   2            AB        Living   2         SAB        IAB        Ectopic        Molar        Multiple        Live Births   2               Menstrual History:     No LMP recorded (lmp unknown). Patient is postmenopausal.       # 1 - Date: , Sex: Female, Weight: 3671 g (8 lb 1.5 oz), GA: None, Delivery: Vaginal, Spontaneous, Apgar1: None, Apgar5: None, Living: Living, Birth Comments: None    # 2 - Date: , Sex: Male, Weight: 3714 g (8 lb 3 oz), GA: None, Delivery: Vaginal, Spontaneous, Apgar1: None, Apgar5: None, Living: Living, Birth Comments: None    Family History   Problem Relation Age of Onset    Hypotension Mother     Asthma Mother     No Known Problems Father     Hypertension Brother     Breast cancer Neg Hx     Ovarian cancer Neg Hx     Uterine cancer Neg Hx     Colon cancer Neg Hx     Malig Hyperthermia Neg Hx      Social History     Tobacco Use    Smoking status: Never     Passive exposure: Never    Smokeless tobacco: Never   Vaping Use    Vaping Use: Never used   Substance Use Topics    Alcohol use: Not Currently     Comment: OCCASIONAL    Drug use: No     Patient has no known allergies.    Current Outpatient Medications:     acetaminophen (TYLENOL) 325 MG tablet, Take 2 tablets by mouth Every 6 (Six) Hours., Disp: 60 tablet, Rfl: 1    carvedilol (Coreg)  "25 MG tablet, Take 1 tablet by mouth 2 (Two) Times a Day With Meals., Disp: 180 tablet, Rfl: 0    docusate sodium (COLACE) 250 MG capsule, Take 1 capsule by mouth 2 (Two) Times a Day As Needed for Constipation., Disp: 60 capsule, Rfl: 1    ibuprofen (ADVIL,MOTRIN) 600 MG tablet, Take 1 tablet by mouth Every 6 (Six) Hours., Disp: 60 tablet, Rfl: 1    oxyCODONE (ROXICODONE) 5 MG immediate release tablet, Take 1-2 tablets by mouth Every 4 (Four) Hours As Needed for Moderate Pain., Disp: 15 tablet, Rfl: 0    spironolactone (ALDACTONE) 25 MG tablet, Take 1 tablet by mouth Daily., Disp: 90 tablet, Rfl: 0    VITAMIN D PO, Take 1 tablet by mouth Daily., Disp: , Rfl:     The following portions of the patient's history were reviewed and updated as appropriate: allergies, current medications, past family history, past medical history, past social history, past surgical history, and problem list.    Review of Systems   All other systems reviewed and are negative.    BP Readings from Last 3 Encounters:   09/15/23 119/77   09/13/23 105/70   08/10/23 122/78      Wt Readings from Last 3 Encounters:   09/13/23 93.4 kg (205 lb 14.4 oz)   08/10/23 91.2 kg (201 lb)   08/09/23 91.4 kg (201 lb 9.6 oz)      BMI: Estimated body mass index is 35.34 kg/m² as calculated from the following:    Height as of 9/13/23: 162.6 cm (64\").    Weight as of 9/13/23: 93.4 kg (205 lb 14.4 oz). BSA: Estimated body surface area is 1.98 meters squared as calculated from the following:    Height as of 9/13/23: 162.6 cm (64\").    Weight as of 9/13/23: 93.4 kg (205 lb 14.4 oz).    Objective   Physical Exam  Vitals and nursing note reviewed.   Constitutional:       General: She is not in acute distress.     Appearance: Normal appearance.   Pulmonary:      Effort: Pulmonary effort is normal. No respiratory distress.   Abdominal:      General: There is no distension.      Palpations: Abdomen is soft.      Tenderness: There is no abdominal tenderness.      Comments: " Incisions c/d/i   Neurological:      General: No focal deficit present.      Mental Status: She is alert and oriented to person, place, and time.   Psychiatric:         Mood and Affect: Mood normal.         Behavior: Behavior normal.         Thought Content: Thought content normal.         Judgment: Judgment normal.       Assessment & Plan     Diagnoses and all orders for this visit:    1. Postoperative follow-up (Primary)    -Doing great postop, no acute issues  - Reviewed benign pathology    Return in about 1 year (around 9/27/2024), or if symptoms worsen or fail to improve, for Annual.    Tamika Sweeney MD   9/27/2023 08:03 EDT

## 2023-10-23 ENCOUNTER — OFFICE VISIT (OUTPATIENT)
Dept: ONCOLOGY | Facility: CLINIC | Age: 58
End: 2023-10-23
Payer: COMMERCIAL

## 2023-10-23 VITALS
OXYGEN SATURATION: 99 % | SYSTOLIC BLOOD PRESSURE: 109 MMHG | BODY MASS INDEX: 34.79 KG/M2 | HEIGHT: 64 IN | WEIGHT: 203.8 LBS | RESPIRATION RATE: 16 BRPM | TEMPERATURE: 97.1 F | DIASTOLIC BLOOD PRESSURE: 72 MMHG | HEART RATE: 61 BPM

## 2023-10-23 DIAGNOSIS — C50.212 MALIGNANT NEOPLASM OF UPPER-INNER QUADRANT OF LEFT BREAST IN FEMALE, ESTROGEN RECEPTOR NEGATIVE: Primary | ICD-10-CM

## 2023-10-23 DIAGNOSIS — Z79.899 HIGH RISK MEDICATION USE: ICD-10-CM

## 2023-10-23 DIAGNOSIS — Z17.1 MALIGNANT NEOPLASM OF UPPER-INNER QUADRANT OF LEFT BREAST IN FEMALE, ESTROGEN RECEPTOR NEGATIVE: Primary | ICD-10-CM

## 2023-10-23 PROCEDURE — 99214 OFFICE O/P EST MOD 30 MIN: CPT | Performed by: INTERNAL MEDICINE

## 2023-10-23 NOTE — PROGRESS NOTES
Subjective   Bebe Kiser is a 58 y.o. female.  Referred by Dr. Holley for left breast invasive ductal carcinoma, triple negative    History of Present Illness     Patient is a 57-year-old postmenopausal -American lady who presented with a screen detected abnormality of the left breast.    She denies any family history of breast cancer.  No previous abnormal mammograms or biopsies.  Comorbidities include hypertension.    8/4/2022-bilateral screening mammogram  Platelets are almost entirely fatty.  There is a new small, oval mass measuring 8 mm with indistinct margins, associated fine linear calcifications in the middle one third region of the left breast at 9:00.  No suspicious abnormalities of the right breast.    8/19/2022-left breast diagnostic mammogram  The breast is almost entirely fatty.  Additional evaluation of the left breast at 9:00 there is a small irregular mass measuring 6 mm with indistinct margins and associated fine linear calcifications in the middle one third region of the left breast, 10 cm from the nipple.    Left breast ultrasound  Ultrasound demonstrates an irregular heterogeneous mass with indistinct margins measuring 5 x 5 x 4 mm in the middle third, 9:30 position of the left breast, 10 cm from the nipple    Impression  Ultrasound-guided biopsy recommended.    8/25/2022-ultrasound-guided biopsy  Pathology consistent with invasive ductal carcinoma  Grade 3  Tumor size 0.7 cm  No lymphovascular space invasion  ER negative  MA negative  HER2 1+  Ki-67 58.07%    9/8/2022-genetic testing ordered.  Positive for PAL B2 pathogenic mutation.    She was evaluated by Dr. Holley on 9/8/2022 and referred for discussion of neoadjuvant versus adjuvant therapy.    9/27/2022-bilateral breast MRI-biopsy-proven malignancy in the left breast at 10 o'clock position measures 0.8 cm.  0.6 cm ill-defined focus of enhancement 1 cm anterior to the biopsy-proven malignancy.  Mammographically microcalcifications  are seen.  Correlation with left breast biopsy or surgical excision of the calcifications is recommended.  No suspicious findings in the right breast.    10/18/2022-bilateral mastectomy  1.right breast simple skin sparing mastectomy-benign breast parenchyma with no significant changes.  2.left axillary sentinel lymph node 1-benign  3.left axillary sentinel lymph node 2-benign  4.left breast simple skin sparing mastectomy  A.invasive ductal carcinoma, poorly differentiated, grade 3  Tumor measures 8 mm  Margins are negative for invasive carcinoma  No lymphovascular space invasion  B.clip and biopsy site changes  Seen on unremarkable skin and nipple    Receptors repeated and confirmed to be ER negative, DC negative, HER2 1+ on immunohistochemistry  Ki-67 65%    Patient presents to the clinic today accompanied by her daughter to discuss adjuvant therapy recommendations.  She is recovering well from surgery.  No tissue expanders placed and plans to perform reconstruction following completion of chemotherapy    Her daughter underwent genetic testing and tested positive for PAL B2 mutation.    Ms. torres received 4 cycles of Taxotere and cyclophosphamide.  She tolerated treatment well.    Hysterectomy and bilateral oophorectomy performed in September 2023 and pathology was benign.    Interval History:  Ms. torres returns today for follow-up.  She has no new complaints at this time.  Denies any new chest wall lesions.  No new bone pains, cough, abdominal pain nausea vomiting  9/13/2023 CBC reviewed and within normal limits  BMP reviewed and within normal limits    The following portions of the patient's history were reviewed and updated as appropriate: allergies, current medications, past family history, past medical history, past social history, past surgical history and problem list.    Past Medical History:   Diagnosis Date    At risk for sleep apnea     PATIENT STATES SHE HAS HAD A SLEEP STUDY AND IT WAS NEGATIVE     Breast cancer     LEFT    Elevated cholesterol     History of chemotherapy     Hypertension     Monoallelic mutation of PALB2 gene         Past Surgical History:   Procedure Laterality Date    BREAST SURGERY Left 10/18/2022    BIOPSY    BREAST TISSUE EXPANDER REMOVAL INSERTION OF IMPLANT Bilateral 06/15/2023    Procedure: BILATERAL DELAYED IMPLANT PLACEMENT WITH POSSIBLE ALLODERM AND REMOVE MEDIPORT;  Surgeon: Kevan Agustin MD;  Location: [facility];  Service: Plastics;  Laterality: Bilateral;    CATARACT EXTRACTION Bilateral 2019    COLONOSCOPY      2013    DIAGNOSTIC LAPAROSCOPY Bilateral 9/15/2023    Procedure: Davinci-assisted bilateral salpingo-oophorectomy with pelvic washings;  Surgeon: Tamika Sweeney MD;  Location: [facility];  Service: Robotics - DaVinci;  Laterality: Bilateral;    MASTECTOMY WITH SENTINEL NODE BIOPSY AND AXILLARY NODE DISSECTION Bilateral 10/18/2022    Procedure: bilateral skin-sparing mastectomy and left sentinel lymph node biopsy;  Surgeon: Manisha Holley MD;  Location: [facility];  Service: General;  Laterality: Bilateral;    VENOUS ACCESS DEVICE (PORT) INSERTION Right 10/18/2022    Procedure: Port placement;  Surgeon: Manisha Holley MD;  Location: [facility];  Service: General;  Laterality: Right;    VENOUS ACCESS DEVICE (PORT) REMOVAL          Family History   Problem Relation Age of Onset    Hypotension Mother     Asthma Mother     No Known Problems Father     Hypertension Brother     Breast cancer Neg Hx     Ovarian cancer Neg Hx     Uterine cancer Neg Hx     Colon cancer Neg Hx     Malig Hyperthermia Neg Hx         Social History     Socioeconomic History    Marital status:     Number of children: 3   Tobacco Use    Smoking status: Never     Passive exposure: Never    Smokeless tobacco: Never   Vaping Use    Vaping Use: Never used   Substance and Sexual Activity    Alcohol use: Not Currently     Comment: OCCASIONAL    Drug use: No  "   Sexual activity: Not Currently     Partners: Male     Birth control/protection: None        OB History          2    Para   2    Term   2            AB        Living   2         SAB        IAB        Ectopic        Molar        Multiple        Live Births   2             Age at menarche-10   3 para 2  1  Age at first live childbirth-20  Age at menopause-47  All contraceptive pill use-30 years  Hormone replacement therapy-none    No Known Allergies     Review of Systems   Constitutional: Negative.    HENT: Negative.     Eyes: Negative.    Respiratory: Negative.     Cardiovascular: Negative.    Gastrointestinal: Negative.    Endocrine: Negative.    Genitourinary: Negative.    Musculoskeletal: Negative.    Allergic/Immunologic: Negative.    Neurological: Negative.    Hematological: Negative.    Psychiatric/Behavioral:  The patient is nervous/anxious.      Review of systems as mentioned HPI otherwise negative    Objective   Blood pressure 109/72, pulse 61, temperature 97.1 °F (36.2 °C), temperature source Temporal, resp. rate 16, height 162.6 cm (64.02\"), weight 92.4 kg (203 lb 12.8 oz), SpO2 99%, not currently breastfeeding.     Physical Exam  Vitals reviewed.   Constitutional:       Appearance: Normal appearance.   HENT:      Head: Normocephalic and atraumatic.      Nose: Nose normal.      Mouth/Throat:      Mouth: Mucous membranes are moist.      Pharynx: Oropharynx is clear.   Eyes:      Conjunctiva/sclera: Conjunctivae normal.      Pupils: Pupils are equal, round, and reactive to light.   Cardiovascular:      Rate and Rhythm: Normal rate and regular rhythm.      Pulses: Normal pulses.      Heart sounds: Normal heart sounds.   Pulmonary:      Effort: Pulmonary effort is normal.      Breath sounds: Normal breath sounds.   Abdominal:      General: Abdomen is flat. Bowel sounds are normal.   Musculoskeletal:         General: Normal range of motion.      Cervical back: Normal range of " motion.   Skin:     General: Skin is warm and dry.      Findings: No rash.   Neurological:      General: No focal deficit present.      Mental Status: She is alert and oriented to person, place, and time.      Motor: No weakness.   Psychiatric:         Mood and Affect: Mood normal.         Behavior: Behavior normal.         Thought Content: Thought content normal.         Judgment: Judgment normal.       Breast Exam: Status post bilateral mastectomy without reconstruction.  No expanders.    I have reexamined the patient and the results are consistent with the previously documented exam. Alba Bernardo MD      No visits with results within 30 Day(s) from this visit.   Latest known visit with results is:   Admission on 09/15/2023, Discharged on 09/15/2023   Component Date Value Ref Range Status    Case Report 09/15/2023    Final                    Value:Non-gynecologic Cytology                          Case: [id number]                                  Authorizing Provider:  Tamika Sweeney MD   Collected:           09/15/2023 10:26 AM          Ordering Location:     [facility]  Received:            09/15/2023 10:58 AM                                 MAIN OR                                                                      Pathologist:           Ioana Espinal MD                                                    Specimen:    Pelvis, pelvic washings                                                                    Final Diagnosis 09/15/2023    Final                    Value:This result contains rich text formatting which cannot be displayed here.    Gross Description 09/15/2023    Final                    Value:This result contains rich text formatting which cannot be displayed here.    Microscopic Description 09/15/2023    Final                    Value:This result contains rich text formatting which cannot be displayed here.    Case Report 09/15/2023    Final                     Value:Surgical Pathology Report                         Case: [id number]                                  Authorizing Provider:  Tamika Sweeney MD   Collected:           09/15/2023 10:34 AM          Ordering Location:     [facility]  Received:            09/15/2023 11:17 AM                                 MAIN OR                                                                      Pathologist:           Yunior Celeste MD                                                         Specimen:    Ovaries, Bilateral with Fallopian Tubes, bilateral fallopian tubes and ovaries             Final Diagnosis 09/15/2023    Final                    Value:This result contains rich text formatting which cannot be displayed here.    Gross Description 09/15/2023    Final                    Value:This result contains rich text formatting which cannot be displayed here.        No radiology results for the last 30 days.   Assessment & Plan       *Left breast invasive ductal carcinoma  Grade 3, ER/AZ negative, HER2 1+ on immunohistochemistry, Ki-67 58%  Tumor measures 8 mm on mammogram and 5 mm on ultrasound  On the biopsy the tumor measures 0.7 cm.  MRI of the breast confirmed the tumor to be 0.8cm   Status post bilateral mastectomy on 10/18/2022.  Pathology consistent with invasive ductal carcinoma of the left breast measuring 8 mm, triple negative, grade 3, Ki-67 65%  pT1b N0 M0, stage Ia clinical, stage Ib prognostic  We discussed the benefit of chemotherapy and tumors which are over 5 mm and triple negative.  Given that the tumor is under 1 cm we will proceed with Taxotere and cyclophosphamide.  Cycle 1 TC administered 11/14/2022 1/16/2023 received fourth cycle of TC  Underwent reconstructive surgery 6/15/2023.  Healing well.  Also had port removed.  No evidence of disease recurrence today.  9/13/2023-hysterectomy and bilateral salpingo-oophorectomy and aspiration of the pelvic fluid all of which were  benign.    *Pathogenic PAL B2 mutation  We discussed the autosomal dominant inheritance of the PAL B2 mutation.  We also discussed the penetrance and the screening recommendations.  She thinks that her paternal grandmother might have had pancreatic cancer but unsure.  We discussed the increased risk of pancreatic cancer associated PAL B2 mutation.  Discussed screening recommendations of annual MRCP or EUS for pancreatic cancer starting at the age of 50.  Given that there is no clear first-degree relative with pancreatic cancer she would not meet guidelines recommendation for screening for pancreatic cancer.  We discussed the signs and symptoms of pancreatic cancer including nausea, vomiting, epigastric pain and discomfort, abdominal distention, weight loss  She does not have a family history of ovarian cancer hence there is no clear indication for risk reducing salpingo-oophorectomy.  Recommend that she discuss this further with her GYN.   She plans to move her appointment up with her gynecologist.  Recommend that she at the least undergo transvaginal ultrasound and .   Discussed that the ovarian cancer risk is up to 5%.  Status post bilateral oophorectomy and    *Hypertension-blood pressure 109/72    *Obesity-BMI 35     *Anemia  Resolved  Hemoglobin normal    PLAN:   STACY in 4 months and MD in 8 months.    Alba Bernardo MD  10/23/23

## 2023-11-11 DIAGNOSIS — I10 PRIMARY HYPERTENSION: ICD-10-CM

## 2023-11-13 RX ORDER — CARVEDILOL 25 MG/1
TABLET ORAL
Qty: 180 TABLET | Refills: 0 | Status: SHIPPED | OUTPATIENT
Start: 2023-11-13 | End: 2023-11-20 | Stop reason: SDUPTHER

## 2023-11-19 DIAGNOSIS — I10 ESSENTIAL HYPERTENSION: ICD-10-CM

## 2023-11-20 DIAGNOSIS — I10 ESSENTIAL HYPERTENSION: ICD-10-CM

## 2023-11-20 DIAGNOSIS — I10 PRIMARY HYPERTENSION: ICD-10-CM

## 2023-11-20 RX ORDER — SPIRONOLACTONE 25 MG/1
25 TABLET ORAL DAILY
Qty: 90 TABLET | Refills: 0 | Status: CANCELLED | OUTPATIENT
Start: 2023-11-20

## 2023-11-20 RX ORDER — SPIRONOLACTONE 25 MG/1
25 TABLET ORAL DAILY
Qty: 90 TABLET | Refills: 0 | Status: SHIPPED | OUTPATIENT
Start: 2023-11-20

## 2023-11-20 RX ORDER — CARVEDILOL 25 MG/1
25 TABLET ORAL 2 TIMES DAILY WITH MEALS
Qty: 180 TABLET | Refills: 0 | Status: SHIPPED | OUTPATIENT
Start: 2023-11-20

## 2023-11-20 NOTE — TELEPHONE ENCOUNTER
Caller: Bebe Kiser    Relationship to patient: Self    Best call back number: [phone]    Patient is needing: PHARMACY TOLD PATIENT THAT THEY DIDN'T GET THE      carvedilol (COREG) 25 MG tablet     PLEASE RESEND

## 2024-02-02 NOTE — PROGRESS NOTES
[facility]     Referring Provider: Fatoumata Hidalgo MD     Chief complaint: Routine follow up breast cancer     Subjective   HPI:   9/8/2022:  Ms. Bebe Kiser is a 58 yo woman, seen at the request of Dr. Fatoumata Hidalgo, for a new diagnosis of left breast cancer. This was initially detected as an imaging abnormality on routine screening. Her most recent mammogram prior to this year was in 2016. Her work-up is detailed in the oncologic history below. Prior to the biopsy, she denies any breast lumps, pain, skin changes, or nipple discharge. She denies any prior history of abnormal mammograms or breast biopsies. She denies any family history of breast or ovarian cancer.    9/28/2022, Telephone Encounter:  I called Ms. Kiser to let her know that the MRI did not show any evidence of lymph node disease, so I think we are still okay with a surgery first approach.  However her genetic testing did show a PALB2 mutation conferring an increased risk of a second primary breast cancer.  In this situation I would recommend a bilateral mastectomy.  I will see her back next week to review the new surgical plan.  I have also let Dr. Agustin know.    10/6/2022:  She returns today to discuss a change in the surgery plan due to her newly diagnosed PALB2 mutation.      11/2/2022:  She underwent bilateral mastectomy, left SLNB, and port placement on 10/18/22. See surgery & pathology details below in oncologic history. She has been recovering well and has no complaints. Drain outputs have been less than 20 mL on each side for the past few days.     2/2/2023:  She returns today for scheduled follow-up.  She saw PT once postoperatively and her bioimpedance score was within normal limits.  She completed TC x4 on 1/16/2023.  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  She is hoping to have her reconstruction with Dr. Agustin in May.  She denies any new breast related complaints.    8/2/2023  She returns today  for scheduled follow-up.  She underwent implant placement and port removal with Dr. Agustin in June.  She is happy with her cosmetic result.  She sees gynecology next week and plans on discussing oophorectomy.    2/5/2024 Interval history  Patient presenting to the office today for routine follow-up.  She has no new breast complaints or concerns today.  She last saw her oncologist in October with no changes made to the treatment plan.    Oncology/Hematology History   Malignant neoplasm of upper-inner quadrant of left breast in female, estrogen receptor negative   1/5/2016 Imaging    Bilateral Diagnostic MMG ([facility]):  The parenchyma of both breasts is largely fatty-replaced. I see no masses, areas of architectural distortion or skin thickening. There is no evidence for axillary lymphadenopathy or nipple retraction.  BI-RADS 1: Negative.     8/3/2022 Initial Diagnosis    Malignant neoplasm of upper-inner quadrant of left breast in female, estrogen receptor negative (HCC)     8/4/2022 Imaging    Screening MMG with Gautam ([facility]):  The breasts are almost entirely fatty.   There is a new small, oval mass measuring 8 mm with indistinct margins and associated fine-linear calcifications seen in the middle one-third region of the left breast at 9 o'clock.  In the right breast, no suspicious masses, significant calcifications or other abnormalities are seen.  BI-RADS 0: Incomplete.      8/19/2022 Imaging    Left Diagnostic MMG with Gautam & Left Breast US ([facility]):   MMG:  On the present examination, there is a small, irregular mass measuring 6 mm with indistinct margins and associated fine-linear calcifications in the middle one-third 9:30 o'clock region of the left breast located 10 centimeters from the nipple.   US:   Ultrasound demonstrates an irregular heterogeneous solid mass with indistinct margins measuring 5 x 5 x 4 mm in the middle one-third 9:30 o'clock region of the left breast located 10 centimeters from the nipple.    BI-RADS 4C: Suspicious.      8/26/2022 Biopsy    Left Breast, US-Guided Biopsy ([facility]):    Left Breast, 9:30, Core Biopsies:   Invasive Mammary Carcinoma, No special type (Ductal)    Histologic Grade ([place]):     Glandular/Tubular Score: 3     Nuclear Score: 3     Mitotic Score: 2     Overall Grade: 3 (High Grade)    Size: 0.7 cm    Lymphovascular Invasion: Not Identified     Microcalcifications: Microcalcifications in Invasive Carcinoma.   No DCIS is Identified.   -Clip projects up to 6 mm away from residual calcifications on postbiopsy mammogram.    ER negative (0%)  SD negative (0%)  Her2 negative (IHC 1+)  Ki-67 58.07%     9/8/2022 Genetic Testing    Breast & Gyn Cancers Panel (36 genes):    PALB2 pathogenic mutation     9/27/2022 Imaging    Bilateral Breast MRI ([facility]):  In the middle third upper inner quadrant of the left breast centered at the 10 o'clock position on the order of 7 cm from nipple there is an irregular enhancing mass that measures on the order of 0.7 cm in anterior to posterior dimension, 0.6 cm in the superior to inferior dimension and 0.8 cm in the medial to lateral dimension. A focal signal void is seen at the posterior margin of the lesion. This represents the biopsy-proven site of malignancy with the adjacent mini cork-shaped metallic clip. Below threshold nonspecific thin linear enhancement that measures up to 1 cm in length is seen at the posterior margin of the biopsy clip. No mammographic abnormality in this region is appreciated.  Located on the order of 1 cm anterior to the biopsy-proven malignancy at the 10 o'clock axis is a 0.6 cm ill-defined focus of enhancement. Mammographically there are faint microcalcifications that are seen in this region on the mammogram dated 08/19/2022, these can be seen on the spot magnification image and to a lesser degree on the mammogram dated 8/4/2022.  No other areas of suspicious enhancement or morphology are seen in the left breast. I see no  evidence for abnormal left breast skin, nipple or chest wall enhancement and there is no evidence for left axillary or internal mammary chain adenopathy.   There are no areas of suspicious enhancement or morphology in the right breast. I see no evidence for abnormal right breast skin, nipple or chest wall enhancement and there is no evidence for right axillary or internal mammary chain adenopathy.  BI-RADS 4: Suspicious.     10/18/2022 Surgery    Port placement, bilateral skin-sparing mastectomy and left sentinel lymph node biopsy-    1. Right Breast, Simple Skin-Sparing Mastectomy (980 Grams):               A. Benign breast parenchyma with no significant histopathologic changes.               B. Unremarkable skin and nipple.                 2. Left Axillary [place] Lymph Node #1, Hot and Blue, Count 1600, Biopsy (FS):               A. One lymph node, negative for carcinoma (0/1).                 3. Left Axillary [place] Lymph Node #2, Hot not Blue, Count 500, Biopsy (FS):               A. Two lymph nodes, negative for carcinoma (0/2).     4. Left Breast, Simple Skin-Sparing Mastectomy (1,170 Grams):               A. INVASIVE DUCTAL CARCINOMA, Poorly differentiated; [place] Histologic Grade III/III       (tubule score = 3, nuclear score = 3, mitoses score = 3):                            1. Tumor size: 8 mm (measured on slide 4D).                            2. Margins are negative for invasive carcinoma; Closest distance: Invasive carcinoma is       present 14 mm from the anterior surface.                            3. Negative for lymphovascular space invasion.               B. Clip and biopsy site changes are present and adjacent to invasive carcinoma.               C. Unremarkable skin and nipple.               D. See Synoptic Report (to include parts 2-3) and Comment.    ER negative (<1%)  HI negative (<1%)  Her2 negative (IHC 1+)  Ki-67 65%     11/14/2022 - 1/16/2023 Chemotherapy    OP BREAST TC DOCEtaxel  / Cyclophosphamide           Review of Systems:  See interval history.       Medications:    Current Outpatient Medications:     acetaminophen (TYLENOL) 325 MG tablet, Take 2 tablets by mouth Every 6 (Six) Hours., Disp: 60 tablet, Rfl: 1    carvedilol (COREG) 25 MG tablet, Take 1 tablet by mouth 2 (Two) Times a Day With Meals., Disp: 180 tablet, Rfl: 0    docusate sodium (COLACE) 250 MG capsule, Take 1 capsule by mouth 2 (Two) Times a Day As Needed for Constipation. (Patient not taking: Reported on 9/27/2023), Disp: 60 capsule, Rfl: 1    ibuprofen (ADVIL,MOTRIN) 600 MG tablet, Take 1 tablet by mouth Every 6 (Six) Hours., Disp: 60 tablet, Rfl: 1    oxyCODONE (ROXICODONE) 5 MG immediate release tablet, Take 1-2 tablets by mouth Every 4 (Four) Hours As Needed for Moderate Pain. (Patient not taking: Reported on 9/27/2023), Disp: 15 tablet, Rfl: 0    spironolactone (ALDACTONE) 25 MG tablet, TAKE 1 TABLET BY MOUTH EVERY DAY, Disp: 90 tablet, Rfl: 0    VITAMIN D PO, Take 1 tablet by mouth Daily., Disp: , Rfl:       Allergies:  No Known Allergies      Family History   Problem Relation Age of Onset    Hypotension Mother     Asthma Mother     No Known Problems Father     Hypertension Brother     Breast cancer Neg Hx     Ovarian cancer Neg Hx     Uterine cancer Neg Hx     Colon cancer Neg Hx     Malig Hyperthermia Neg Hx      Objective   PHYSICAL EXAMINATION:   There were no vitals filed for this visit.    ECOG 0 - Asymptomatic  General: NAD, well appearing  Psych: a&o x 3, normal mood and affect  Eyes: EOMI, no scleral icterus  ENMT: neck supple without masses or thyromegaly, mucus membranes moist  MSK: normal gait, normal ROM in bilateral shoulders  Lymph nodes: no cervical, supraclavicular or axillary lymphadenopathy  Breast:   Right: Sp mastectomy with implant in place.  Soft scar.  No masses.  Left: Sp mastectomy with implant in place.  Soft scar.  No masses.    Assessment & Plan   Assessment:  59 y.o. F with a PALB2  mutation and a diagnosis of left breast cancer: High grade, triple negative, invasive ductal carcinoma. She underwent bilateral mastectomy, left SLNB, and port placement on 10/18/22, pT1bN0, anatomic stage IA, prognostic stage IB.  She completed adjuvant TC x4 on 1/16/2023.    Plan:  -Continue follow-up with Dr. Bernardo.  -Follow-up in 6 months for clinical exam.    STACY Alba      CC:  MD Ysabel Bonilla MD

## 2024-02-05 ENCOUNTER — OFFICE VISIT (OUTPATIENT)
Dept: SURGERY | Facility: CLINIC | Age: 59
End: 2024-02-05
Payer: COMMERCIAL

## 2024-02-05 VITALS
HEIGHT: 64 IN | OXYGEN SATURATION: 98 % | WEIGHT: 203 LBS | BODY MASS INDEX: 34.66 KG/M2 | SYSTOLIC BLOOD PRESSURE: 112 MMHG | HEART RATE: 64 BPM | DIASTOLIC BLOOD PRESSURE: 72 MMHG

## 2024-02-05 DIAGNOSIS — C50.212 MALIGNANT NEOPLASM OF UPPER-INNER QUADRANT OF LEFT BREAST IN FEMALE, ESTROGEN RECEPTOR NEGATIVE: Primary | ICD-10-CM

## 2024-02-05 DIAGNOSIS — Z17.1 MALIGNANT NEOPLASM OF UPPER-INNER QUADRANT OF LEFT BREAST IN FEMALE, ESTROGEN RECEPTOR NEGATIVE: Primary | ICD-10-CM

## 2024-02-05 PROCEDURE — 99213 OFFICE O/P EST LOW 20 MIN: CPT | Performed by: NURSE PRACTITIONER

## 2024-02-13 ENCOUNTER — OFFICE VISIT (OUTPATIENT)
Dept: ONCOLOGY | Facility: CLINIC | Age: 59
End: 2024-02-13
Payer: COMMERCIAL

## 2024-02-13 VITALS
HEART RATE: 79 BPM | DIASTOLIC BLOOD PRESSURE: 75 MMHG | SYSTOLIC BLOOD PRESSURE: 118 MMHG | HEIGHT: 64 IN | BODY MASS INDEX: 36.07 KG/M2 | RESPIRATION RATE: 18 BRPM | WEIGHT: 211.3 LBS | OXYGEN SATURATION: 99 % | TEMPERATURE: 97.3 F

## 2024-02-13 DIAGNOSIS — Z17.1 MALIGNANT NEOPLASM OF UPPER-INNER QUADRANT OF LEFT BREAST IN FEMALE, ESTROGEN RECEPTOR NEGATIVE: Primary | ICD-10-CM

## 2024-02-13 DIAGNOSIS — C50.212 MALIGNANT NEOPLASM OF UPPER-INNER QUADRANT OF LEFT BREAST IN FEMALE, ESTROGEN RECEPTOR NEGATIVE: Primary | ICD-10-CM

## 2024-02-16 ENCOUNTER — OFFICE VISIT (OUTPATIENT)
Dept: FAMILY MEDICINE CLINIC | Facility: CLINIC | Age: 59
End: 2024-02-16
Payer: COMMERCIAL

## 2024-02-16 VITALS
OXYGEN SATURATION: 97 % | HEART RATE: 66 BPM | DIASTOLIC BLOOD PRESSURE: 78 MMHG | SYSTOLIC BLOOD PRESSURE: 122 MMHG | HEIGHT: 64 IN | BODY MASS INDEX: 35.99 KG/M2 | WEIGHT: 210.8 LBS

## 2024-02-16 DIAGNOSIS — I10 ESSENTIAL HYPERTENSION: Primary | ICD-10-CM

## 2024-02-16 DIAGNOSIS — E78.5 HYPERLIPIDEMIA, UNSPECIFIED HYPERLIPIDEMIA TYPE: ICD-10-CM

## 2024-02-16 PROCEDURE — 99214 OFFICE O/P EST MOD 30 MIN: CPT | Performed by: NURSE PRACTITIONER

## 2024-02-16 RX ORDER — CARVEDILOL 25 MG/1
25 TABLET ORAL 2 TIMES DAILY WITH MEALS
Qty: 180 TABLET | Refills: 1 | Status: SHIPPED | OUTPATIENT
Start: 2024-02-16

## 2024-02-16 RX ORDER — SPIRONOLACTONE 25 MG/1
25 TABLET ORAL DAILY
Qty: 90 TABLET | Refills: 1 | Status: SHIPPED | OUTPATIENT
Start: 2024-02-16

## 2024-02-17 LAB
ALBUMIN SERPL-MCNC: 4.5 G/DL (ref 3.5–5.2)
ALBUMIN/GLOB SERPL: 1.9 G/DL
ALP SERPL-CCNC: 63 U/L (ref 39–117)
ALT SERPL-CCNC: 13 U/L (ref 1–33)
AST SERPL-CCNC: 17 U/L (ref 1–32)
BASOPHILS # BLD AUTO: 0.03 10*3/MM3 (ref 0–0.2)
BASOPHILS NFR BLD AUTO: 0.4 % (ref 0–1.5)
BILIRUB SERPL-MCNC: 0.3 MG/DL (ref 0–1.2)
BUN SERPL-MCNC: 21 MG/DL (ref 8–23)
BUN/CREAT SERPL: 26.9 (ref 7–25)
CALCIUM SERPL-MCNC: 9.6 MG/DL (ref 8.6–10.5)
CHLORIDE SERPL-SCNC: 104 MMOL/L (ref 98–107)
CHOLEST SERPL-MCNC: 203 MG/DL (ref 0–200)
CO2 SERPL-SCNC: 24.9 MMOL/L (ref 22–29)
CREAT SERPL-MCNC: 0.78 MG/DL (ref 0.57–1)
EGFRCR SERPLBLD CKD-EPI 2021: 86.5 ML/MIN/1.73
EOSINOPHIL # BLD AUTO: 0.27 10*3/MM3 (ref 0–0.4)
EOSINOPHIL NFR BLD AUTO: 3.7 % (ref 0.3–6.2)
ERYTHROCYTE [DISTWIDTH] IN BLOOD BY AUTOMATED COUNT: 12 % (ref 12.3–15.4)
GLOBULIN SER CALC-MCNC: 2.4 GM/DL
GLUCOSE SERPL-MCNC: 98 MG/DL (ref 65–99)
HCT VFR BLD AUTO: 36.9 % (ref 34–46.6)
HDLC SERPL-MCNC: 56 MG/DL (ref 40–60)
HGB BLD-MCNC: 12 G/DL (ref 12–15.9)
IMM GRANULOCYTES # BLD AUTO: 0.02 10*3/MM3 (ref 0–0.05)
IMM GRANULOCYTES NFR BLD AUTO: 0.3 % (ref 0–0.5)
LDLC SERPL CALC-MCNC: 135 MG/DL (ref 0–100)
LYMPHOCYTES # BLD AUTO: 1.86 10*3/MM3 (ref 0.7–3.1)
LYMPHOCYTES NFR BLD AUTO: 25.5 % (ref 19.6–45.3)
MCH RBC QN AUTO: 30.2 PG (ref 26.6–33)
MCHC RBC AUTO-ENTMCNC: 32.5 G/DL (ref 31.5–35.7)
MCV RBC AUTO: 92.9 FL (ref 79–97)
MONOCYTES # BLD AUTO: 0.6 10*3/MM3 (ref 0.1–0.9)
MONOCYTES NFR BLD AUTO: 8.2 % (ref 5–12)
NEUTROPHILS # BLD AUTO: 4.51 10*3/MM3 (ref 1.7–7)
NEUTROPHILS NFR BLD AUTO: 61.9 % (ref 42.7–76)
NRBC BLD AUTO-RTO: 0 /100 WBC (ref 0–0.2)
PLATELET # BLD AUTO: 242 10*3/MM3 (ref 140–450)
POTASSIUM SERPL-SCNC: 4.6 MMOL/L (ref 3.5–5.2)
PROT SERPL-MCNC: 6.9 G/DL (ref 6–8.5)
RBC # BLD AUTO: 3.97 10*6/MM3 (ref 3.77–5.28)
SODIUM SERPL-SCNC: 139 MMOL/L (ref 136–145)
TRIGL SERPL-MCNC: 65 MG/DL (ref 0–150)
VLDLC SERPL CALC-MCNC: 12 MG/DL (ref 5–40)
WBC # BLD AUTO: 7.29 10*3/MM3 (ref 3.4–10.8)

## 2024-06-04 ENCOUNTER — OFFICE VISIT (OUTPATIENT)
Dept: ONCOLOGY | Facility: CLINIC | Age: 59
End: 2024-06-04
Payer: COMMERCIAL

## 2024-06-04 VITALS
TEMPERATURE: 98.1 F | HEIGHT: 64 IN | DIASTOLIC BLOOD PRESSURE: 70 MMHG | BODY MASS INDEX: 35.94 KG/M2 | SYSTOLIC BLOOD PRESSURE: 90 MMHG | OXYGEN SATURATION: 97 % | WEIGHT: 210.5 LBS | HEART RATE: 65 BPM

## 2024-06-04 DIAGNOSIS — I10 ESSENTIAL HYPERTENSION: ICD-10-CM

## 2024-06-04 DIAGNOSIS — Z17.1 MALIGNANT NEOPLASM OF UPPER-INNER QUADRANT OF LEFT BREAST IN FEMALE, ESTROGEN RECEPTOR NEGATIVE: Primary | ICD-10-CM

## 2024-06-04 DIAGNOSIS — C50.212 MALIGNANT NEOPLASM OF UPPER-INNER QUADRANT OF LEFT BREAST IN FEMALE, ESTROGEN RECEPTOR NEGATIVE: Primary | ICD-10-CM

## 2024-06-04 PROCEDURE — 99214 OFFICE O/P EST MOD 30 MIN: CPT | Performed by: INTERNAL MEDICINE

## 2024-06-04 NOTE — PROGRESS NOTES
Subjective   Bebe Kiser is a 59 y.o. female.  Referred by Dr. Holley for left breast invasive ductal carcinoma, triple negative    History of Present Illness     Patient is a 57-year-old postmenopausal -American lady who presented with a screen detected abnormality of the left breast.    She denies any family history of breast cancer.  No previous abnormal mammograms or biopsies.  Comorbidities include hypertension.    8/4/2022-bilateral screening mammogram  Platelets are almost entirely fatty.  There is a new small, oval mass measuring 8 mm with indistinct margins, associated fine linear calcifications in the middle one third region of the left breast at 9:00.  No suspicious abnormalities of the right breast.    8/19/2022-left breast diagnostic mammogram  The breast is almost entirely fatty.  Additional evaluation of the left breast at 9:00 there is a small irregular mass measuring 6 mm with indistinct margins and associated fine linear calcifications in the middle one third region of the left breast, 10 cm from the nipple.    Left breast ultrasound  Ultrasound demonstrates an irregular heterogeneous mass with indistinct margins measuring 5 x 5 x 4 mm in the middle third, 9:30 position of the left breast, 10 cm from the nipple    Impression  Ultrasound-guided biopsy recommended.    8/25/2022-ultrasound-guided biopsy  Pathology consistent with invasive ductal carcinoma  Grade 3  Tumor size 0.7 cm  No lymphovascular space invasion  ER negative  NH negative  HER2 1+  Ki-67 58.07%    9/8/2022-genetic testing ordered.  Positive for PAL B2 pathogenic mutation.    She was evaluated by Dr. Holley on 9/8/2022 and referred for discussion of neoadjuvant versus adjuvant therapy.    9/27/2022-bilateral breast MRI-biopsy-proven malignancy in the left breast at 10 o'clock position measures 0.8 cm.  0.6 cm ill-defined focus of enhancement 1 cm anterior to the biopsy-proven malignancy.  Mammographically microcalcifications  are seen.  Correlation with left breast biopsy or surgical excision of the calcifications is recommended.  No suspicious findings in the right breast.    10/18/2022-bilateral mastectomy  1.right breast simple skin sparing mastectomy-benign breast parenchyma with no significant changes.  2.left axillary sentinel lymph node 1-benign  3.left axillary sentinel lymph node 2-benign  4.left breast simple skin sparing mastectomy  A.invasive ductal carcinoma, poorly differentiated, grade 3  Tumor measures 8 mm  Margins are negative for invasive carcinoma  No lymphovascular space invasion  B.clip and biopsy site changes  Seen on unremarkable skin and nipple    Receptors repeated and confirmed to be ER negative, PA negative, HER2 1+ on immunohistochemistry  Ki-67 65%    Patient presents to the clinic today accompanied by her daughter to discuss adjuvant therapy recommendations.  She is recovering well from surgery.  No tissue expanders placed and plans to perform reconstruction following completion of chemotherapy    Her daughter underwent genetic testing and tested positive for PAL B2 mutation.    Ms. torres received 4 cycles of Taxotere and cyclophosphamide.  She tolerated treatment well.    Hysterectomy and bilateral oophorectomy performed in September 2023 and pathology was benign.    Interval Follow-up:  Ms. Torres returns today in follow-up.    She has started exercising regularly and has been walking about 3 miles per day and also doing squats.  She has noticed that she has been feeling a little lightheaded and funny for the past few weeks.  Today the blood pressure is low at 90/70.  She is currently on Coreg 25 mg twice daily and also on spironolactone.  She is decreased amount of salt consumption in her diet.  Denies any new bone pains, cough, abdominal pain nausea vomiting.  Denies any chest wall abnormalities at this time.    The following portions of the patient's history were reviewed and updated as appropriate:  allergies, current medications, past family history, past medical history, past social history, past surgical history and problem list.    Past Medical History:   Diagnosis Date    At risk for sleep apnea     PATIENT STATES SHE HAS HAD A SLEEP STUDY AND IT WAS NEGATIVE    Breast cancer     LEFT    Elevated cholesterol     History of chemotherapy     Hypertension     Monoallelic mutation of PALB2 gene         Past Surgical History:   Procedure Laterality Date    BREAST SURGERY Left 10/18/2022    BIOPSY    BREAST TISSUE EXPANDER REMOVAL INSERTION OF IMPLANT Bilateral 06/15/2023    Procedure: BILATERAL DELAYED IMPLANT PLACEMENT WITH POSSIBLE ALLODERM AND REMOVE MEDIPORT;  Surgeon: Kevan Agustin MD;  Location: [facility];  Service: Plastics;  Laterality: Bilateral;    CATARACT EXTRACTION Bilateral 2019    COLONOSCOPY      2013    DIAGNOSTIC LAPAROSCOPY Bilateral 9/15/2023    Procedure: Davinci-assisted bilateral salpingo-oophorectomy with pelvic washings;  Surgeon: Tamika Sweeney MD;  Location: [facility];  Service: Robotics - DaVinci;  Laterality: Bilateral;    MASTECTOMY WITH SENTINEL NODE BIOPSY AND AXILLARY NODE DISSECTION Bilateral 10/18/2022    Procedure: bilateral skin-sparing mastectomy and left sentinel lymph node biopsy;  Surgeon: Manisha Holley MD;  Location: [facility];  Service: General;  Laterality: Bilateral;    VENOUS ACCESS DEVICE (PORT) INSERTION Right 10/18/2022    Procedure: Port placement;  Surgeon: Manisha Holley MD;  Location: [facility];  Service: General;  Laterality: Right;    VENOUS ACCESS DEVICE (PORT) REMOVAL          Family History   Problem Relation Age of Onset    Hypotension Mother     Asthma Mother     No Known Problems Father     Hypertension Brother     Breast cancer Neg Hx     Ovarian cancer Neg Hx     Uterine cancer Neg Hx     Colon cancer Neg Hx     Malig Hyperthermia Neg Hx         Social History     Socioeconomic History    Marital  "status:     Number of children: 3   Tobacco Use    Smoking status: Never     Passive exposure: Never    Smokeless tobacco: Never   Vaping Use    Vaping status: Never Used   Substance and Sexual Activity    Alcohol use: Not Currently     Comment: OCCASIONAL    Drug use: No    Sexual activity: Not Currently     Partners: Male     Birth control/protection: None        OB History          2    Para   2    Term   2            AB        Living   2         SAB        IAB        Ectopic        Molar        Multiple        Live Births   2             Age at menarche-10   3 para 2  1  Age at first live childbirth-20  Age at menopause-47  All contraceptive pill use-30 years  Hormone replacement therapy-none    No Known Allergies     Review of Systems   Constitutional: Negative.    HENT: Negative.     Eyes: Negative.    Respiratory: Negative.     Cardiovascular: Negative.    Gastrointestinal: Negative.    Endocrine: Negative.    Genitourinary: Negative.    Musculoskeletal: Negative.    Allergic/Immunologic: Negative.    Neurological: Negative.    Hematological: Negative.    Psychiatric/Behavioral:  The patient is nervous/anxious.      Review of systems as mentioned HPI otherwise negative    Objective   Blood pressure 90/70, pulse 65, temperature 98.1 °F (36.7 °C), temperature source Oral, height 162 cm (63.78\"), weight 95.5 kg (210 lb 8 oz), SpO2 97%, not currently breastfeeding.     Physical Exam  Vitals reviewed.   Constitutional:       Appearance: Normal appearance.   HENT:      Head: Normocephalic and atraumatic.      Nose: Nose normal.      Mouth/Throat:      Mouth: Mucous membranes are moist.      Pharynx: Oropharynx is clear.   Eyes:      Conjunctiva/sclera: Conjunctivae normal.      Pupils: Pupils are equal, round, and reactive to light.   Cardiovascular:      Rate and Rhythm: Normal rate and regular rhythm.      Pulses: Normal pulses.      Heart sounds: Normal heart sounds. "   Pulmonary:      Effort: Pulmonary effort is normal.      Breath sounds: Normal breath sounds.   Abdominal:      General: Abdomen is flat. Bowel sounds are normal.   Musculoskeletal:         General: Normal range of motion.      Cervical back: Normal range of motion.   Skin:     General: Skin is warm and dry.      Findings: No rash.   Neurological:      General: No focal deficit present.      Mental Status: She is alert and oriented to person, place, and time.      Motor: No weakness.   Psychiatric:         Mood and Affect: Mood normal.         Behavior: Behavior normal.         Thought Content: Thought content normal.         Judgment: Judgment normal.       Breast Exam: Status post bilateral mastectomies with implants in place.  No chest wall changes.  No new palpable abnormalities.      I have reexamined the patient and the results are consistent with the previously documented exam. Alba Bernardo MD      No visits with results within 30 Day(s) from this visit.   Latest known visit with results is:   Office Visit on 02/16/2024   Component Date Value Ref Range Status    WBC 02/16/2024 7.29  3.40 - 10.80 10*3/mm3 Final    RBC 02/16/2024 3.97  3.77 - 5.28 10*6/mm3 Final    Hemoglobin 02/16/2024 12.0  12.0 - 15.9 g/dL Final    Hematocrit 02/16/2024 36.9  34.0 - 46.6 % Final    MCV 02/16/2024 92.9  79.0 - 97.0 fL Final    MCH 02/16/2024 30.2  26.6 - 33.0 pg Final    MCHC 02/16/2024 32.5  31.5 - 35.7 g/dL Final    RDW 02/16/2024 12.0 (L)  12.3 - 15.4 % Final    Platelets 02/16/2024 242  140 - 450 10*3/mm3 Final    Neutrophil Rel % 02/16/2024 61.9  42.7 - 76.0 % Final    Lymphocyte Rel % 02/16/2024 25.5  19.6 - 45.3 % Final    Monocyte Rel % 02/16/2024 8.2  5.0 - 12.0 % Final    Eosinophil Rel % 02/16/2024 3.7  0.3 - 6.2 % Final    Basophil Rel % 02/16/2024 0.4  0.0 - 1.5 % Final    Neutrophils Absolute 02/16/2024 4.51  1.70 - 7.00 10*3/mm3 Final    Lymphocytes Absolute 02/16/2024 1.86  0.70 - 3.10 10*3/mm3 Final     Monocytes Absolute 02/16/2024 0.60  0.10 - 0.90 10*3/mm3 Final    Eosinophils Absolute 02/16/2024 0.27  0.00 - 0.40 10*3/mm3 Final    Basophils Absolute 02/16/2024 0.03  0.00 - 0.20 10*3/mm3 Final    Immature Granulocyte Rel % 02/16/2024 0.3  0.0 - 0.5 % Final    Immature Grans Absolute 02/16/2024 0.02  0.00 - 0.05 10*3/mm3 Final    nRBC 02/16/2024 0.0  0.0 - 0.2 /100 WBC Final    Glucose 02/16/2024 98  65 - 99 mg/dL Final    BUN 02/16/2024 21  8 - 23 mg/dL Final    Creatinine 02/16/2024 0.78  0.57 - 1.00 mg/dL Final    EGFR Result 02/16/2024 86.5  >60.0 mL/min/1.73 Final    Comment: GFR Normal >60  Chronic Kidney Disease <60  Kidney Failure <15      BUN/Creatinine Ratio 02/16/2024 26.9 (H)  7.0 - 25.0 Final    Sodium 02/16/2024 139  136 - 145 mmol/L Final    Potassium 02/16/2024 4.6  3.5 - 5.2 mmol/L Final    Chloride 02/16/2024 104  98 - 107 mmol/L Final    Total CO2 02/16/2024 24.9  22.0 - 29.0 mmol/L Final    Calcium 02/16/2024 9.6  8.6 - 10.5 mg/dL Final    Total Protein 02/16/2024 6.9  6.0 - 8.5 g/dL Final    Albumin 02/16/2024 4.5  3.5 - 5.2 g/dL Final    Globulin 02/16/2024 2.4  gm/dL Final    A/G Ratio 02/16/2024 1.9  g/dL Final    Total Bilirubin 02/16/2024 0.3  0.0 - 1.2 mg/dL Final    Alkaline Phosphatase 02/16/2024 63  39 - 117 U/L Final    AST (SGOT) 02/16/2024 17  1 - 32 U/L Final    ALT (SGPT) 02/16/2024 13  1 - 33 U/L Final    Total Cholesterol 02/16/2024 203 (H)  0 - 200 mg/dL Final    Comment: Cholesterol Reference Ranges  (U.S. Department of Health and Human Services ATP III  Classifications)  Desirable          <200 mg/dL  Borderline High    200-239 mg/dL  High Risk          >240 mg/dL  Triglyceride Reference Ranges  (U.S. Department of Health and Human Services ATP III  Classifications)  Normal           <150 mg/dL  Borderline High  150-199 mg/dL  High             200-499 mg/dL  Very High        >500 mg/dL  HDL Reference Ranges  (U.S. Department of Health and Human Services ATP  III  Classifications)  Low     <40 mg/dl (major risk factor for CHD)  High    >60 mg/dl ('negative' risk factor for CHD)  LDL Reference Ranges  (U.S. Department of Health and Human Services ATP III  Classifications)  Optimal          <100 mg/dL  Near Optimal     100-129 mg/dL  Borderline High  130-159 mg/dL  High             160-189 mg/dL  Very High        >189 mg/dL      Triglycerides 02/16/2024 65  0 - 150 mg/dL Final    HDL Cholesterol 02/16/2024 56  40 - 60 mg/dL Final    VLDL Cholesterol [place] 02/16/2024 12  5 - 40 mg/dL Final    LDL Chol Calc ([facility]) 02/16/2024 135 (H)  0 - 100 mg/dL Final        No radiology results for the last 30 days.   Assessment & Plan       *Left breast invasive ductal carcinoma  Grade 3, ER/NM negative, HER2 1+ on immunohistochemistry, Ki-67 58%  Tumor measures 8 mm on mammogram and 5 mm on ultrasound  On the biopsy the tumor measures 0.7 cm.  MRI of the breast confirmed the tumor to be 0.8cm   Status post bilateral mastectomy on 10/18/2022.  Pathology consistent with invasive ductal carcinoma of the left breast measuring 8 mm, triple negative, grade 3, Ki-67 65%  pT1b N0 M0, stage Ia clinical, stage Ib prognostic  We discussed the benefit of chemotherapy and tumors which are over 5 mm and triple negative.  Given that the tumor is under 1 cm we will proceed with Taxotere and cyclophosphamide.  Cycle 1 TC administered 11/14/2022 1/16/2023 received fourth cycle of TC  Underwent reconstructive surgery 6/15/2023.  Also had port removed.  6/4/2024-no evidence of recurrent disease.    *Hypotension  Patient has been on antihypertensives, Coreg 25 mg twice daily and spironolactone 25 mg daily.  She has been on these medications for a long time.  Today the blood pressure is low at 90/70  Patient is symptomatic  I have recommended her to cut down the dose of the evening Coreg to 12.5 mg and contact her primary care physician tomorrow.  We also discussed maintaining a log of the blood  pressure.    *Pathogenic PAL B2 mutation  We discussed the autosomal dominant inheritance of the PAL B2 mutation.  We also discussed the penetrance and the screening recommendations.  She thinks that her paternal grandmother might have had pancreatic cancer but unsure.  We discussed the increased risk of pancreatic cancer associated PAL B2 mutation.  Discussed screening recommendations of annual MRCP or EUS for pancreatic cancer starting at the age of 50.  Given that there is no clear first-degree relative with pancreatic cancer she would not meet guidelines recommendation for screening for pancreatic cancer.  We discussed the signs and symptoms of pancreatic cancer including nausea, vomiting, epigastric pain and discomfort, abdominal distention, weight loss  She does not have a family history of ovarian cancer hence there is no clear indication for risk reducing salpingo-oophorectomy.  Recommend that she discuss this further with her GYN.   She plans to move her appointment up with her gynecologist.  Recommend that she at the least undergo transvaginal ultrasound and .   Discussed that the ovarian cancer risk is up to 5%.  Status post bilateral salpingo oophorectomy     *Hypertension-blood pressure low at 90/70    *Obesity  Body mass index is 36.38 kg/m².    *Anemia  Resolved    PLAN:   ZEYAD on exam  APRN in 4 months and MD in 8 months  Follow-up closely with primary care physician regarding the low blood pressure.  Medication adjustments made today for this reason.    Alba Bernardo MD  06/04/24

## 2024-06-25 ENCOUNTER — OFFICE VISIT (OUTPATIENT)
Dept: FAMILY MEDICINE CLINIC | Facility: CLINIC | Age: 59
End: 2024-06-25
Payer: COMMERCIAL

## 2024-06-25 VITALS
WEIGHT: 212.6 LBS | HEIGHT: 64 IN | DIASTOLIC BLOOD PRESSURE: 88 MMHG | SYSTOLIC BLOOD PRESSURE: 128 MMHG | BODY MASS INDEX: 36.29 KG/M2 | HEART RATE: 70 BPM | OXYGEN SATURATION: 99 %

## 2024-06-25 DIAGNOSIS — I10 ESSENTIAL HYPERTENSION: Primary | ICD-10-CM

## 2024-06-25 DIAGNOSIS — E78.2 MIXED HYPERLIPIDEMIA: ICD-10-CM

## 2024-06-25 LAB
ALBUMIN SERPL-MCNC: 4.5 G/DL (ref 3.5–5.2)
ALBUMIN/GLOB SERPL: 1.8 G/DL
ALP SERPL-CCNC: 61 U/L (ref 39–117)
ALT SERPL-CCNC: 21 U/L (ref 1–33)
AST SERPL-CCNC: 22 U/L (ref 1–32)
BASOPHILS # BLD AUTO: 0.05 10*3/MM3 (ref 0–0.2)
BASOPHILS NFR BLD AUTO: 0.6 % (ref 0–1.5)
BILIRUB SERPL-MCNC: 0.3 MG/DL (ref 0–1.2)
BUN SERPL-MCNC: 17 MG/DL (ref 8–23)
BUN/CREAT SERPL: 20.5 (ref 7–25)
CALCIUM SERPL-MCNC: 9.7 MG/DL (ref 8.6–10.5)
CHLORIDE SERPL-SCNC: 98 MMOL/L (ref 98–107)
CHOLEST SERPL-MCNC: 207 MG/DL (ref 0–200)
CO2 SERPL-SCNC: 27.6 MMOL/L (ref 22–29)
CREAT SERPL-MCNC: 0.83 MG/DL (ref 0.57–1)
EGFRCR SERPLBLD CKD-EPI 2021: 80.3 ML/MIN/1.73
EOSINOPHIL # BLD AUTO: 0.33 10*3/MM3 (ref 0–0.4)
EOSINOPHIL NFR BLD AUTO: 3.7 % (ref 0.3–6.2)
ERYTHROCYTE [DISTWIDTH] IN BLOOD BY AUTOMATED COUNT: 11.8 % (ref 12.3–15.4)
GLOBULIN SER CALC-MCNC: 2.5 GM/DL
GLUCOSE SERPL-MCNC: 94 MG/DL (ref 65–99)
HCT VFR BLD AUTO: 37.4 % (ref 34–46.6)
HDLC SERPL-MCNC: 57 MG/DL (ref 40–60)
HGB BLD-MCNC: 12.2 G/DL (ref 12–15.9)
IMM GRANULOCYTES # BLD AUTO: 0.04 10*3/MM3 (ref 0–0.05)
IMM GRANULOCYTES NFR BLD AUTO: 0.4 % (ref 0–0.5)
LDLC SERPL CALC-MCNC: 132 MG/DL (ref 0–100)
LYMPHOCYTES # BLD AUTO: 1.96 10*3/MM3 (ref 0.7–3.1)
LYMPHOCYTES NFR BLD AUTO: 22 % (ref 19.6–45.3)
MCH RBC QN AUTO: 30.3 PG (ref 26.6–33)
MCHC RBC AUTO-ENTMCNC: 32.6 G/DL (ref 31.5–35.7)
MCV RBC AUTO: 92.8 FL (ref 79–97)
MONOCYTES # BLD AUTO: 0.72 10*3/MM3 (ref 0.1–0.9)
MONOCYTES NFR BLD AUTO: 8.1 % (ref 5–12)
NEUTROPHILS # BLD AUTO: 5.8 10*3/MM3 (ref 1.7–7)
NEUTROPHILS NFR BLD AUTO: 65.2 % (ref 42.7–76)
NRBC BLD AUTO-RTO: 0 /100 WBC (ref 0–0.2)
PLATELET # BLD AUTO: 264 10*3/MM3 (ref 140–450)
POTASSIUM SERPL-SCNC: 4.6 MMOL/L (ref 3.5–5.2)
PROT SERPL-MCNC: 7 G/DL (ref 6–8.5)
RBC # BLD AUTO: 4.03 10*6/MM3 (ref 3.77–5.28)
SODIUM SERPL-SCNC: 137 MMOL/L (ref 136–145)
TRIGL SERPL-MCNC: 99 MG/DL (ref 0–150)
VLDLC SERPL CALC-MCNC: 18 MG/DL (ref 5–40)
WBC # BLD AUTO: 8.9 10*3/MM3 (ref 3.4–10.8)

## 2024-06-25 PROCEDURE — 99214 OFFICE O/P EST MOD 30 MIN: CPT | Performed by: NURSE PRACTITIONER

## 2024-06-25 RX ORDER — LISINOPRIL 10 MG/1
10 TABLET ORAL DAILY
Qty: 90 TABLET | Refills: 1 | Status: SHIPPED | OUTPATIENT
Start: 2024-06-25

## 2024-06-25 NOTE — ASSESSMENT & PLAN NOTE
Lipid abnormalities are uncontrolled with diet based on labs from 2/2024.  Encouraged lifestyle changes.  Will re-assess lipids today.

## 2024-06-25 NOTE — PROGRESS NOTES
"Chief Complaint  Hypertension (Follow up on htn and medication change)    Subjective        HPI   History of Present Illness      MATTEO presents to [facility] for follow-up on hypertension.     Hypertension - She takes her Carvedilol 25mg daily and 12.5mg in evening (changed by Hem/Onc) and Spironolactone 25mg daily every morning.  She has been checking her home BP and reports her blood pressure has been fluctuating daily ranging between /64-87.  She has history of headaches and chest tightness and her blood pressure would be below 120/80.   She reports on average, her BP reads 112/78.  Her heart rate ranges between 55-97.  She denies CP, heart palpitations, and Syncope today.  She will take Excedrine for headache pain and it will help relieve headache.  She will have about 1 headache per month.  Advised patient to take Tylenol instead of Excedrin for headache pain due to having high blood pressure.  She admits having been high blood pressure with headaches for the past 40 years.  Patient is requesting she come off the Spironolactone.  Recommend to trial off Sprionolactone 25mg daily and start Lisinopril 10mg daily and she agreed.     Hyperlipidemia - elevated on prior lab from 4/2022.  She is not currently taking cholesterol medication.  Discussed importance of dietary changes and exercise daily.    Objective   Vital Signs:   Vitals:    06/25/24 0840 06/25/24 0948   BP: Comment: unable to hear 128/88  Comment: re-checked   BP Location:  Right arm   Patient Position:  Sitting   Cuff Size:  Adult   Pulse: 70    SpO2: 99%    Weight: 96.4 kg (212 lb 9.6 oz)    Height: 162 cm (63.78\")             2/13/2024     4:07 PM   PHQ-2/PHQ-9 Depression Screening   Little Interest or Pleasure in Doing Things 0-->not at all   Feeling Down, Depressed or Hopeless 0-->not at all   PHQ-9: Brief Depression Severity Measure Score 0       Physical Exam  Vitals and nursing note reviewed. "   Constitutional:       General: She is not in acute distress.     Appearance: Normal appearance. She is not ill-appearing.   HENT:      Head: Normocephalic and atraumatic.   Cardiovascular:      Rate and Rhythm: Normal rate and regular rhythm.      Heart sounds: Normal heart sounds. No murmur heard.  Pulmonary:      Effort: Pulmonary effort is normal. No respiratory distress.      Breath sounds: Normal breath sounds. No wheezing.   Musculoskeletal:      Right lower leg: No edema.      Left lower leg: No edema.   Skin:     General: Skin is warm.   Neurological:      Mental Status: She is alert.          Result Review :     The following data was reviewed by: STACY Bacon on 06/25/2024:      CBC & Differential (02/16/2024 09:58)  Comprehensive Metabolic Panel (02/16/2024 09:58)  Lipid Panel (02/16/2024 09:58)     Assessment and Plan    Assessment & Plan  Essential hypertension  Hypertension is controlled on Coreg 25mg AM and 12.5mg PM (dose changed by Hem/Onc) and Spironolactone 25mg daily.  Decrease table salt and foods high in salt.  Weight loss.  Increase activity daily.  Continue Coreg 25mg AM and 12.5mg PM.  STOP Spironolactone.  START Lisinopril 10mg daily.  Discussed SE of med.  Advised to continue checking home BP 2 hours after taking BP medication daily and bring log to next visit.  Blood pressure will be re-assessed in 2 weeks.    Mixed hyperlipidemia  Lipid abnormalities are uncontrolled with diet based on labs from 2/2024.  Encouraged lifestyle changes.  Will re-assess lipids today.      Orders Placed This Encounter   Procedures    Comprehensive Metabolic Panel    Lipid Panel    CBC & Differential     New Medications Ordered This Visit   Medications    lisinopril (PRINIVIL,ZESTRIL) 10 MG tablet     Sig: Take 1 tablet by mouth Daily.     Dispense:  90 tablet     Refill:  1          Follow Up   Return in about 2 weeks (around 7/9/2024) for Follow-up Blood Pressure - medication change.  Patient was  given instructions and counseling regarding her condition or for health maintenance advice. Please see specific information pulled into the AVS if appropriate.

## 2024-06-25 NOTE — ASSESSMENT & PLAN NOTE
Hypertension is controlled on Coreg 25mg AM and 12.5mg PM (dose changed by Hem/Onc) and Spironolactone 25mg daily.  Decrease table salt and foods high in salt.  Weight loss.  Increase activity daily.  Continue Coreg 25mg AM and 12.5mg PM.  STOP Spironolactone.  START Lisinopril 10mg daily.  Discussed SE of med.  Advised to continue checking home BP 2 hours after taking BP medication daily and bring log to next visit.  Blood pressure will be re-assessed in 2 weeks.

## 2024-07-09 ENCOUNTER — OFFICE VISIT (OUTPATIENT)
Dept: FAMILY MEDICINE CLINIC | Facility: CLINIC | Age: 59
End: 2024-07-09
Payer: COMMERCIAL

## 2024-07-09 VITALS
OXYGEN SATURATION: 97 % | HEART RATE: 59 BPM | HEIGHT: 64 IN | BODY MASS INDEX: 36.54 KG/M2 | DIASTOLIC BLOOD PRESSURE: 82 MMHG | WEIGHT: 214 LBS | SYSTOLIC BLOOD PRESSURE: 128 MMHG

## 2024-07-09 DIAGNOSIS — K59.09 OTHER CONSTIPATION: ICD-10-CM

## 2024-07-09 DIAGNOSIS — Z00.00 HEALTH CARE MAINTENANCE: ICD-10-CM

## 2024-07-09 DIAGNOSIS — Z00.00 ENCOUNTER FOR ANNUAL PHYSICAL EXAM: Primary | ICD-10-CM

## 2024-07-09 DIAGNOSIS — Z12.11 ENCOUNTER FOR SCREENING COLONOSCOPY: ICD-10-CM

## 2024-07-09 DIAGNOSIS — I10 ESSENTIAL HYPERTENSION: Chronic | ICD-10-CM

## 2024-07-09 DIAGNOSIS — E78.00 PURE HYPERCHOLESTEROLEMIA: Chronic | ICD-10-CM

## 2024-07-09 PROBLEM — K59.00 CONSTIPATION: Status: ACTIVE | Noted: 2024-07-09

## 2024-07-09 PROCEDURE — 99213 OFFICE O/P EST LOW 20 MIN: CPT | Performed by: NURSE PRACTITIONER

## 2024-07-09 PROCEDURE — 99396 PREV VISIT EST AGE 40-64: CPT | Performed by: NURSE PRACTITIONER

## 2024-07-09 RX ORDER — CARVEDILOL 25 MG/1
25 TABLET ORAL NIGHTLY
Start: 2024-07-09

## 2024-07-09 NOTE — ASSESSMENT & PLAN NOTE
Hypertension is controlled.  Decrease table salt and foods high in salt.  Weight loss.  Increase activity daily.  Continue Carvedilol 25mg daily in PM and Lisinopril 10mg daily.  Blood pressure will be re-assessed in 3 months.

## 2024-07-09 NOTE — PROGRESS NOTES
Chief Complaint  Annual checkup and follow-up on uncontrolled Hypertension.    HISTORY    Bebe Kiser is a 59 y.o. female who presents to the office today as a  an established patient for their annual preventative exam.     No hospitalization(s) within the last year.     Current exercise regimen: She just started exercising jogging/walking for 10 minutes per day, twice per day.    Status of chronic medical conditions:    Hypertension - controlled on Carvedilol 25mg daily in PM and Lisinopril 10mg daily.  She denies CP, heart palpitations, SOB and Syncope.     Hyperlipidemia - elevated on prior lab from 6/2024.  She is not currently taking cholesterol medication and is working on diet.    Patient's overall comments about their health or any other specific health concerns they report:   She reports her health is fair.      Constipation - she reports having increased constipation and using suppository to help with constipation but is not working very well.    First day of last menstrual period if pre-menopausal: Postmenopause.    Patient's contraception status (if applicable):  N/A    Review of Systems   Constitutional: Negative.    HENT: Negative.     Eyes: Negative.    Respiratory: Negative.     Cardiovascular: Negative.    Gastrointestinal:  Positive for constipation and nausea. Negative for abdominal distention, abdominal pain, anal bleeding, blood in stool, diarrhea, rectal pain, vomiting, GERD and indigestion.        She has been using suppositories to control constipation.   Endocrine: Negative.    Genitourinary: Negative.    Musculoskeletal: Negative.    Skin: Negative.    Allergic/Immunologic: Positive for environmental allergies. Negative for food allergies and immunocompromised state.        Seasonal allergies controlled with OTC allergy meds   Neurological: Negative.    Hematological: Negative.    Psychiatric/Behavioral: Negative.          Health Maintenance Summary            Overdue - TDAP/TD VACCINES  (1 - Tdap) Never done      No completion history exists for this topic.              Overdue - ZOSTER VACCINE (1 of 2) Never done      No completion history exists for this topic.              Overdue - ANNUAL PHYSICAL (Yearly) Overdue since 4/1/2023 04/01/2022  Done              Overdue - COLORECTAL CANCER SCREENING (COLONOSCOPY - Every 10 Years) Overdue since 2/24/2024 02/24/2014  COLONOSCOPY (Done)              INFLUENZA VACCINE (Yearly - August to March) Next due on 8/1/2024      10/28/2023  Outside Immunization: Flu MDCK Quad P-Free Inj    11/04/2022  Imm Admin: Influenza, Unspecified    11/04/2022  Imm Admin: Influenza Injectable Mdck Pf Quad    09/30/2020  Imm Admin: Fluzone Quad >6mos (Multi-dose)    03/08/2018  Declined    Only the first 5 history entries have been loaded, but more history exists.              BMI FOLLOWUP (Yearly) Next due on 2/5/2025 02/05/2024  SmartData: BMI EDUCATION FOR OVERWEIGHT    12/02/2022  SmartData: BMI EDUCATION FOR OVERWEIGHT              LIPID PANEL (Yearly) Next due on 6/25/2025 06/25/2024  Lipid Panel    02/16/2024  Lipid Panel    04/01/2022  Lipid Panel    04/09/2021  Lipid Panel    03/18/2020  Lipid Panel    Only the first 5 history entries have been loaded, but more history exists.              PAP SMEAR (Every 3 Years) Next due on 8/4/2025 08/04/2022  IGP, Apt HPV,rfx 16 / 18,45              HEPATITIS C SCREENING  Completed      12/19/2016  Hep C Virus Ab component of Hepatitis C Antibody              COVID-19 Vaccine (Series Information) Completed      10/28/2023  Imm Admin: COVID-19 F23 (MODERNA) 12YRS+ (SPIKEVAX)    11/04/2022  Imm Admin: COVID-19 (MODERNA) BIVALENT 12+YRS    12/18/2021  Imm Admin: COVID-19 (MODERNA) Monovalent Original Booster    12/18/2021  Imm Admin: COVID-19 (MODERNA) 1st,2nd,3rd Dose Monovalent    02/23/2021  Imm Admin: COVID-19 (MODERNA) 1st,2nd,3rd Dose Monovalent    Only the first 5 history entries have been loaded,  but more history exists.              Pneumococcal Vaccine 0-64 (Series Information) Aged Out      No completion history exists for this topic.                     No Known Allergies     Outpatient Medications Marked as Taking for the 7/9/24 encounter (Office Visit) with Ysabel Coles APRN   Medication Sig Dispense Refill    carvedilol (COREG) 25 MG tablet Take 1 tablet by mouth Every Night.      lisinopril (PRINIVIL,ZESTRIL) 10 MG tablet Take 1 tablet by mouth Daily. 90 tablet 1    VITAMIN D PO Take 1 tablet by mouth Daily.      [DISCONTINUED] carvedilol (COREG) 25 MG tablet Take 1 tablet by mouth 2 (Two) Times a Day With Meals. (Patient taking differently: Take 1 tablet by mouth Every Night.) 180 tablet 1        Past Medical History:   Diagnosis Date    At risk for sleep apnea     PATIENT STATES SHE HAS HAD A SLEEP STUDY AND IT WAS NEGATIVE    Breast cancer     LEFT    Elevated cholesterol     History of chemotherapy     Hypertension     Monoallelic mutation of PALB2 gene      Past Surgical History:   Procedure Laterality Date    BREAST SURGERY Left 10/18/2022    BIOPSY    BREAST TISSUE EXPANDER REMOVAL INSERTION OF IMPLANT Bilateral 06/15/2023    Procedure: BILATERAL DELAYED IMPLANT PLACEMENT WITH POSSIBLE ALLODERM AND REMOVE MEDIPORT;  Surgeon: Kevan Agustin MD;  Location: [facility] OR;  Service: Plastics;  Laterality: Bilateral;    CATARACT EXTRACTION Bilateral 2019    COLONOSCOPY      2013    DIAGNOSTIC LAPAROSCOPY Bilateral 9/15/2023    Procedure: Davinci-assisted bilateral salpingo-oophorectomy with pelvic washings;  Surgeon: Tamika Sweeney MD;  Location: [facility] OR;  Service: Robotics - DaVinci;  Laterality: Bilateral;    MASTECTOMY WITH SENTINEL NODE BIOPSY AND AXILLARY NODE DISSECTION Bilateral 10/18/2022    Procedure: bilateral skin-sparing mastectomy and left sentinel lymph node biopsy;  Surgeon: Manisha Holley MD;  Location: [facility] OR;  Service: General;   "Laterality: Bilateral;    VENOUS ACCESS DEVICE (PORT) INSERTION Right 10/18/2022    Procedure: Port placement;  Surgeon: Manisha Holley MD;  Location: [facility];  Service: General;  Laterality: Right;    VENOUS ACCESS DEVICE (PORT) REMOVAL       Family History   Problem Relation Age of Onset    Hypotension Mother     Asthma Mother     No Known Problems Father     Hypertension Brother     Breast cancer Neg Hx     Ovarian cancer Neg Hx     Uterine cancer Neg Hx     Colon cancer Neg Hx     Malig Hyperthermia Neg Hx     reports that she has never smoked. She has never been exposed to tobacco smoke. She has never used smokeless tobacco. She reports that she does not currently use alcohol. She reports that she does not use drugs.    Immunization History   Administered Date(s) Administered    COVID-19 (MODERNA) 1st,2nd,3rd Dose Monovalent 01/29/2021, 02/23/2021, 12/18/2021    COVID-19 (MODERNA) BIVALENT 12+YRS 11/04/2022    COVID-19 (MODERNA) Monovalent Original Booster 12/18/2021    COVID-19 F23 (MODERNA) 12YRS+ (SPIKEVAX) 10/28/2023    Fluzone Quad >6mos (Multi-dose) 12/19/2016, 09/30/2020    Hepatitis B Adult/Adolescent IM 04/21/2004    Influenza Injectable Mdck Pf Quad 11/04/2022    Influenza, Unspecified 11/04/2022        OBJECTIVE    Vital Signs:   /82 (BP Location: Right arm, Patient Position: Sitting, Cuff Size: Adult) Comment: re-checked  Pulse 59   Ht 162 cm (63.78\")   Wt 97.1 kg (214 lb)   SpO2 97%   BMI 36.99 kg/m²     Physical Exam  Vitals and nursing note reviewed.   Constitutional:       General: She is not in acute distress.     Appearance: Normal appearance. She is not ill-appearing.   HENT:      Head: Normocephalic and atraumatic.      Right Ear: Tympanic membrane, ear canal and external ear normal. There is no impacted cerumen.      Left Ear: Tympanic membrane, ear canal and external ear normal. There is no impacted cerumen.      Nose: Nose normal. No congestion or rhinorrhea.      " Mouth/Throat:      Mouth: Mucous membranes are moist.      Pharynx: No oropharyngeal exudate or posterior oropharyngeal erythema.   Eyes:      General: No scleral icterus.        Right eye: No discharge.         Left eye: No discharge.      Extraocular Movements: Extraocular movements intact.      Conjunctiva/sclera: Conjunctivae normal.      Pupils: Pupils are equal, round, and reactive to light.   Neck:      Vascular: No carotid bruit.   Cardiovascular:      Rate and Rhythm: Normal rate and regular rhythm.      Pulses: Normal pulses.      Heart sounds: Normal heart sounds. No murmur heard.     No gallop.   Pulmonary:      Effort: Pulmonary effort is normal. No respiratory distress.      Breath sounds: Normal breath sounds. No wheezing, rhonchi or rales.   Abdominal:      General: Bowel sounds are normal. There is no distension.      Palpations: Abdomen is soft. There is no mass.      Tenderness: There is no abdominal tenderness. There is no right CVA tenderness, left CVA tenderness, guarding or rebound.      Hernia: No hernia is present.   Genitourinary:     Comments: Deferred  Musculoskeletal:         General: No swelling or tenderness. Normal range of motion.      Cervical back: Normal range of motion and neck supple. No rigidity or tenderness.      Right lower leg: No edema.      Left lower leg: No edema.   Lymphadenopathy:      Cervical: No cervical adenopathy.   Skin:     General: Skin is warm.      Capillary Refill: Capillary refill takes less than 2 seconds.      Findings: No lesion or rash.   Neurological:      General: No focal deficit present.      Mental Status: She is alert.      Cranial Nerves: No cranial nerve deficit.      Sensory: No sensory deficit.      Motor: No weakness.      Coordination: Coordination normal.      Gait: Gait normal.   Psychiatric:         Mood and Affect: Mood normal.         Behavior: Behavior normal.          The following data was reviewed by: STACY Bacon on  07/09/2024:    CBC & Differential (06/25/2024 10:04)  Comprehensive Metabolic Panel (06/25/2024 10:04)  Lipid Panel (06/25/2024 10:04)                 ASSESSMENT & PLAN   Diagnoses and all orders for this visit:    1. Encounter for annual physical exam (Primary)    2. Essential hypertension  Assessment & Plan:  Hypertension is controlled.  Decrease table salt and foods high in salt.  Weight loss.  Increase activity daily.  Continue Carvedilol 25mg daily in PM and Lisinopril 10mg daily.  Blood pressure will be re-assessed in 3 months.      Orders:  -     carvedilol (COREG) 25 MG tablet; Take 1 tablet by mouth Every Night.    3. Pure hypercholesterolemia  Assessment & Plan:  Lipid abnormalities are uncontrolled with diet.  ASCVD Risk 7.3%.  Encouraged lifestyle changes.  Will re-assess lipids today.        4. Other constipation  Assessment & Plan:  Increase water intake, decrease caffeine daily.  Increase activity daily.  Stop suppository use.  Take (pt preferred OTC) Stool Softener PRN 1-2 times a day PRN and titrate based on stool consistency.  Take daily (OTC) Fiber supplement.  Add (OTC) Miralax daily PRN as directed on bottle if stool softener not working.  Will continue to monitor.      5. Encounter for screening colonoscopy  -     Ambulatory Referral to General Surgery    6. Health care maintenance  Comments:  Use sunscreen w/sun expsure.  Working smoke/Carbon Monoxide detectors in home.  Wear seatbelt.  Annual Vision/Dental exam.  Increase activity 30min/day x 5 days      #Annual Preventative Health Examination   -Age and sex appropriate physical exam performed and documented. Updated past medical, family, social and surgical histories as well as allergies and care team list. Addressed care gaps listed in the medical record.  -Encouraged seat belt use for every car ride for patient and all occupants.  Encouraged sunscreen use to reduce risk of skin cancer for any days with sun exposure over 20 minutes.  Discussed the importance of smoke and carbon monoxide detectors in the home.   -Encouraged annual dental and vision exams as part of their overall health.  -Encouraged minimum of 30 minutes or more of exercise at a brisk walk or higher 5 days per week combined with a well-balanced diet.  -Immunizations reviewed and updated in EMR.  -Advised that all women who are planning or capable of pregnancy take a daily supplement containing 0.4 to 0.8 mg (400 to 800 ?g) of folic acid.  The 10-year ASCVD risk score ([place] PHILLIPS, et al., 2019) is: 7.3%    Values used to calculate the score:      Age: 59 years      Sex: Female      Is Non- : Yes      Diabetic: No      Tobacco smoker: No      Systolic Blood Pressure: 128 mmHg      Is BP treated: Yes      HDL Cholesterol: 57 mg/dL      Total Cholesterol: 207 mg/dL   -Lipid screening:   Lipid Panel          2/16/2024    09:58 6/25/2024    10:04   Lipid Panel   Total Cholesterol 203  207    Triglycerides 65  99    HDL Cholesterol 56  57    VLDL Cholesterol 12  18    LDL Cholesterol  135  132     Patient is over age 40 and a 10-year ASCVD risk was calculated which does not indicate a need for statin therapy. See plan below.    -Aspirin for primary or secondary prevention: Not applicable, patient is greater than age 60 and risks outweigh benefits for primary prevention.  -Depression and Anxiety screening: Patient denies symptom of anxiety or depression.  -Diabetes screening:  Patient is 35-70 years of age and overweight, screening for diabetes is indicated every 3 years. Screening is up to date.    -Tobacco use screening: Patient denies cigarette use. Tobacco counseling was was not indicated.  -Alcohol use screening: Patient denies alcohol consumption.. Alcohol abuse counseling was was not indicated.  -Illicit drug screening: Patient does not use illicit drugs.  -Hypertension screening: Patient with known diagnosis of hypertension and is receiving treatment.  -HIV  screening: Screening not indicated, not in a high-risk group.  -Syphilis screening: Syphilis screening not indicated.  -Hepatitis B virus screening: Screening not indicated, not in a high-risk group.  -Hepatitis C virus screening:  Patient has already completed Hepatitis C screening. Negative screening on file.   -Colon cancer screening: Will order colonoscopy.   -Lung cancer screening: Patient has never smoked.  -Cervical cancer screening:  Informed patient that the [place]PSTF recommends screening for cervical cancer every 3 years with cervical cytology alone in women aged 21 to 29 years. For women aged 30 to 65 years, the [place]PSTF recommends screening every 3 years with cervical cytology alone, every 5 years with high-risk human papillomavirus (hrHPV) testing alone, or every 5 years with HPV testing in combination with cytology (cotesting). Lasp pap : was abnormal: 9/2023    -Breast cancer screening:  History of left breast cancer with bilateral mastectomy.  She gets manual breast exam yearly by Breast Surgeon, last done in 10/2023.  -BRCA related cancer screening: Informed patient that the [place]PSTF recommends that primary care clinicians assess women with a personal or family history of breast, ovarian, tubal, or peritoneal cancer or who have an ancestry associated with breast cancer susceptibility 1 and 2 (BRCA1/2) gene mutations with an appropriate brief familial risk assessment tool and referred to genetic counseling if risk assessment is positive. Patient does have a known personal history of left breast cancer.  She does not know if she has family history of breast cancer.  -Osteoporosis screening: Informed patient that the [place]PSTF recommends screening for osteoporosis with bone measurement testing to prevent osteoporotic fractures in women 65 years and older. Screening is not applicable at this time.    Follow up in 1 year for annual physical exam.    Patient/family had no further questions at this time and  verbalized understanding of the plan discussed today.     This document has been electronically signed by STACY Bacon  July 9, 2024 11:46 EDT

## 2024-07-09 NOTE — ASSESSMENT & PLAN NOTE
Lipid abnormalities are uncontrolled with diet.  ASCVD Risk 7.3%.  Encouraged lifestyle changes.  Will re-assess lipids today.

## 2024-07-09 NOTE — ASSESSMENT & PLAN NOTE
Increase water intake, decrease caffeine daily.  Increase activity daily.  Stop suppository use.  Take (pt preferred OTC) Stool Softener PRN 1-2 times a day PRN and titrate based on stool consistency.  Take daily (OTC) Fiber supplement.  Add (OTC) Miralax daily PRN as directed on bottle if stool softener not working.  Will continue to monitor.

## 2024-08-05 ENCOUNTER — TELEPHONE (OUTPATIENT)
Dept: SURGERY | Facility: CLINIC | Age: 59
End: 2024-08-05
Payer: COMMERCIAL

## 2024-08-06 ENCOUNTER — PREP FOR SURGERY (OUTPATIENT)
Dept: OTHER | Facility: HOSPITAL | Age: 59
End: 2024-08-06
Payer: COMMERCIAL

## 2024-08-06 DIAGNOSIS — Z12.11 SCREEN FOR COLON CANCER: Primary | ICD-10-CM

## 2024-08-07 DIAGNOSIS — I10 ESSENTIAL HYPERTENSION: Chronic | ICD-10-CM

## 2024-08-07 RX ORDER — CARVEDILOL 25 MG/1
25 TABLET ORAL 2 TIMES DAILY WITH MEALS
Qty: 180 TABLET | Refills: 1 | Status: SHIPPED | OUTPATIENT
Start: 2024-08-07

## 2024-08-19 NOTE — PROGRESS NOTES
[facility]     Referring Provider: Fatoumata Hidalgo MD     Chief complaint: Routine follow up breast cancer     Subjective   HPI:   9/8/2022:  Ms. Bebe Kiser is a 56 yo woman, seen at the request of Dr. Fatoumata Hidalgo, for a new diagnosis of left breast cancer. This was initially detected as an imaging abnormality on routine screening. Her most recent mammogram prior to this year was in 2016. Her work-up is detailed in the oncologic history below. Prior to the biopsy, she denies any breast lumps, pain, skin changes, or nipple discharge. She denies any prior history of abnormal mammograms or breast biopsies. She denies any family history of breast or ovarian cancer.    9/28/2022, Telephone Encounter:  I called Ms. Kiser to let her know that the MRI did not show any evidence of lymph node disease, so I think we are still okay with a surgery first approach.  However her genetic testing did show a PALB2 mutation conferring an increased risk of a second primary breast cancer.  In this situation I would recommend a bilateral mastectomy.  I will see her back next week to review the new surgical plan.  I have also let Dr. Agustin know.    10/6/2022:  She returns today to discuss a change in the surgery plan due to her newly diagnosed PALB2 mutation.      11/2/2022:  She underwent bilateral mastectomy, left SLNB, and port placement on 10/18/22. See surgery & pathology details below in oncologic history. She has been recovering well and has no complaints. Drain outputs have been less than 20 mL on each side for the past few days.     2/2/2023:  She returns today for scheduled follow-up.  She saw PT once postoperatively and her bioimpedance score was within normal limits.  She completed TC x4 on 1/16/2023.  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  She is hoping to have her reconstruction with Dr. Agustin in May.  She denies any new breast related complaints.    8/2/2023  She returns today  for scheduled follow-up.  She underwent implant placement and port removal with Dr. Agustin in June.  She is happy with her cosmetic result.  She sees gynecology next week and plans on discussing oophorectomy.    2/5/2024   Patient presenting to the office today for routine follow-up.  She has no new breast complaints or concerns today.  She last saw her oncologist in October with no changes made to the treatment plan. Sept 2023 has oopphorectomy    8/19/2024 interval history  Patient presenting to the office today for routine follow-up.  She has no new breast complaints or concerns today.  She last saw her oncologist in June with no changes made to the treatment plan.      Oncology/Hematology History   Malignant neoplasm of upper-inner quadrant of left breast in female, estrogen receptor negative   1/5/2016 Imaging    Bilateral Diagnostic MMG ([facility]):  The parenchyma of both breasts is largely fatty-replaced. I see no masses, areas of architectural distortion or skin thickening. There is no evidence for axillary lymphadenopathy or nipple retraction.  BI-RADS 1: Negative.     8/3/2022 Initial Diagnosis    Malignant neoplasm of upper-inner quadrant of left breast in female, estrogen receptor negative (HCC)     8/4/2022 Imaging    Screening MMG with Gautam ([facility]):  The breasts are almost entirely fatty.   There is a new small, oval mass measuring 8 mm with indistinct margins and associated fine-linear calcifications seen in the middle one-third region of the left breast at 9 o'clock.  In the right breast, no suspicious masses, significant calcifications or other abnormalities are seen.  BI-RADS 0: Incomplete.      8/19/2022 Imaging    Left Diagnostic MMG with Gautam & Left Breast US ([facility]):   MMG:  On the present examination, there is a small, irregular mass measuring 6 mm with indistinct margins and associated fine-linear calcifications in the middle one-third 9:30 o'clock region of the left breast located 10  centimeters from the nipple.   US:   Ultrasound demonstrates an irregular heterogeneous solid mass with indistinct margins measuring 5 x 5 x 4 mm in the middle one-third 9:30 o'clock region of the left breast located 10 centimeters from the nipple.   BI-RADS 4C: Suspicious.      8/26/2022 Biopsy    Left Breast, US-Guided Biopsy ([facility]):    Left Breast, 9:30, Core Biopsies:   Invasive Mammary Carcinoma, No special type (Ductal)    Histologic Grade ([facility]):     Glandular/Tubular Score: 3     Nuclear Score: 3     Mitotic Score: 2     Overall Grade: 3 (High Grade)    Size: 0.7 cm    Lymphovascular Invasion: Not Identified     Microcalcifications: Microcalcifications in Invasive Carcinoma.   No DCIS is Identified.   -Clip projects up to 6 mm away from residual calcifications on postbiopsy mammogram.    ER negative (0%)  SC negative (0%)  Her2 negative (IHC 1+)  Ki-67 58.07%     9/8/2022 Genetic Testing    Breast & Gyn Cancers Panel (36 genes):    PALB2 pathogenic mutation     9/27/2022 Imaging    Bilateral Breast MRI ([facility]):  In the middle third upper inner quadrant of the left breast centered at the 10 o'clock position on the order of 7 cm from nipple there is an irregular enhancing mass that measures on the order of 0.7 cm in anterior to posterior dimension, 0.6 cm in the superior to inferior dimension and 0.8 cm in the medial to lateral dimension. A focal signal void is seen at the posterior margin of the lesion. This represents the biopsy-proven site of malignancy with the adjacent mini cork-shaped metallic clip. Below threshold nonspecific thin linear enhancement that measures up to 1 cm in length is seen at the posterior margin of the biopsy clip. No mammographic abnormality in this region is appreciated.  Located on the order of 1 cm anterior to the biopsy-proven malignancy at the 10 o'clock axis is a 0.6 cm ill-defined focus of enhancement. Mammographically there are faint microcalcifications that are seen  in this region on the mammogram dated 08/19/2022, these can be seen on the spot magnification image and to a lesser degree on the mammogram dated 8/4/2022.  No other areas of suspicious enhancement or morphology are seen in the left breast. I see no evidence for abnormal left breast skin, nipple or chest wall enhancement and there is no evidence for left axillary or internal mammary chain adenopathy.   There are no areas of suspicious enhancement or morphology in the right breast. I see no evidence for abnormal right breast skin, nipple or chest wall enhancement and there is no evidence for right axillary or internal mammary chain adenopathy.  BI-RADS 4: Suspicious.     10/18/2022 Surgery    Port placement, bilateral skin-sparing mastectomy and left sentinel lymph node biopsy-    1. Right Breast, Simple Skin-Sparing Mastectomy (980 Grams):               A. Benign breast parenchyma with no significant histopathologic changes.               B. Unremarkable skin and nipple.                 2. Left Axillary [place] Lymph Node #1, Hot and Blue, Count 1600, Biopsy (FS):               A. One lymph node, negative for carcinoma (0/1).                 3. Left Axillary [place] Lymph Node #2, Hot not Blue, Count 500, Biopsy (FS):               A. Two lymph nodes, negative for carcinoma (0/2).     4. Left Breast, Simple Skin-Sparing Mastectomy (1,170 Grams):               A. INVASIVE DUCTAL CARCINOMA, Poorly differentiated; Mame Histologic Grade III/III       (tubule score = 3, nuclear score = 3, mitoses score = 3):                            1. Tumor size: 8 mm (measured on slide 4D).                            2. Margins are negative for invasive carcinoma; Closest distance: Invasive carcinoma is       present 14 mm from the anterior surface.                            3. Negative for lymphovascular space invasion.               B. Clip and biopsy site changes are present and adjacent to invasive carcinoma.                C. Unremarkable skin and nipple.               D. See Synoptic Report (to include parts 2-3) and Comment.    ER negative (<1%)  IA negative (<1%)  Her2 negative (IHC 1+)  Ki-67 65%     11/14/2022 - 1/16/2023 Chemotherapy    OP BREAST TC DOCEtaxel / Cyclophosphamide           Review of Systems:  See interval history.       Medications:    Current Outpatient Medications:     carvedilol (COREG) 25 MG tablet, TAKE 1 TABLET BY MOUTH TWICE A DAY WITH MEALS, Disp: 180 tablet, Rfl: 1    lisinopril (PRINIVIL,ZESTRIL) 10 MG tablet, Take 1 tablet by mouth Daily., Disp: 90 tablet, Rfl: 1    VITAMIN D PO, Take 1 tablet by mouth Daily., Disp: , Rfl:       Allergies:  No Known Allergies      Family History   Problem Relation Age of Onset    Hypotension Mother     Asthma Mother     No Known Problems Father     Hypertension Brother     Breast cancer Neg Hx     Ovarian cancer Neg Hx     Uterine cancer Neg Hx     Colon cancer Neg Hx     Malig Hyperthermia Neg Hx      Objective   PHYSICAL EXAMINATION:   There were no vitals filed for this visit.    ECOG 0 - Asymptomatic  General: NAD, well appearing  Psych: a&o x 3, normal mood and affect  Eyes: EOMI, no scleral icterus  ENMT: neck supple without masses or thyromegaly, mucus membranes moist  MSK: normal gait, normal ROM in bilateral shoulders  Lymph nodes: no cervical, supraclavicular or axillary lymphadenopathy  Breast:   Right: Sp mastectomy with implant in place.  Soft scar.  No masses.  Left: Sp mastectomy with implant in place.  Soft scar.  No masses.    Assessment & Plan   Assessment:  59 y.o. F with a PALB2 mutation and a diagnosis of left breast cancer: High grade, triple negative, invasive ductal carcinoma. She underwent bilateral mastectomy, left SLNB, and port placement on 10/18/22, pT1bN0, anatomic stage IA, prognostic stage IB.  She completed adjuvant TC x4 on 1/16/2023.    Plan:  -Continue follow-up with Dr. Bernardo.  -exam in 1 year    Redd Mendoza  APRN      CC:  MD Ysabel Bonilla MD

## 2024-08-20 ENCOUNTER — OFFICE VISIT (OUTPATIENT)
Dept: SURGERY | Facility: CLINIC | Age: 59
End: 2024-08-20
Payer: COMMERCIAL

## 2024-08-20 VITALS
WEIGHT: 212 LBS | DIASTOLIC BLOOD PRESSURE: 82 MMHG | SYSTOLIC BLOOD PRESSURE: 128 MMHG | OXYGEN SATURATION: 98 % | BODY MASS INDEX: 36.19 KG/M2 | HEART RATE: 90 BPM | HEIGHT: 64 IN

## 2024-08-20 DIAGNOSIS — C50.212 MALIGNANT NEOPLASM OF UPPER-INNER QUADRANT OF LEFT BREAST IN FEMALE, ESTROGEN RECEPTOR NEGATIVE: Primary | ICD-10-CM

## 2024-08-20 DIAGNOSIS — Z17.1 MALIGNANT NEOPLASM OF UPPER-INNER QUADRANT OF LEFT BREAST IN FEMALE, ESTROGEN RECEPTOR NEGATIVE: Primary | ICD-10-CM

## 2024-08-20 PROCEDURE — 99213 OFFICE O/P EST LOW 20 MIN: CPT | Performed by: NURSE PRACTITIONER

## 2024-09-24 ENCOUNTER — OFFICE VISIT (OUTPATIENT)
Dept: ONCOLOGY | Facility: CLINIC | Age: 59
End: 2024-09-24
Payer: COMMERCIAL

## 2024-09-24 VITALS
BODY MASS INDEX: 36.33 KG/M2 | SYSTOLIC BLOOD PRESSURE: 123 MMHG | OXYGEN SATURATION: 99 % | HEART RATE: 68 BPM | TEMPERATURE: 97.8 F | WEIGHT: 212.8 LBS | DIASTOLIC BLOOD PRESSURE: 77 MMHG | HEIGHT: 64 IN

## 2024-09-24 DIAGNOSIS — C50.212 MALIGNANT NEOPLASM OF UPPER-INNER QUADRANT OF LEFT BREAST IN FEMALE, ESTROGEN RECEPTOR NEGATIVE: Primary | ICD-10-CM

## 2024-09-24 DIAGNOSIS — Z17.1 MALIGNANT NEOPLASM OF UPPER-INNER QUADRANT OF LEFT BREAST IN FEMALE, ESTROGEN RECEPTOR NEGATIVE: Primary | ICD-10-CM

## 2024-09-24 PROCEDURE — 99213 OFFICE O/P EST LOW 20 MIN: CPT | Performed by: NURSE PRACTITIONER

## 2024-12-14 DIAGNOSIS — I10 ESSENTIAL HYPERTENSION: ICD-10-CM

## 2024-12-16 RX ORDER — LISINOPRIL 10 MG/1
10 TABLET ORAL DAILY
Qty: 90 TABLET | Refills: 1 | Status: SHIPPED | OUTPATIENT
Start: 2024-12-16

## 2025-01-03 ENCOUNTER — OFFICE VISIT (OUTPATIENT)
Dept: ONCOLOGY | Facility: CLINIC | Age: 60
End: 2025-01-03
Payer: COMMERCIAL

## 2025-01-03 VITALS
SYSTOLIC BLOOD PRESSURE: 121 MMHG | WEIGHT: 214.5 LBS | TEMPERATURE: 97.8 F | OXYGEN SATURATION: 97 % | BODY MASS INDEX: 36.62 KG/M2 | DIASTOLIC BLOOD PRESSURE: 81 MMHG | HEART RATE: 68 BPM | HEIGHT: 64 IN | RESPIRATION RATE: 16 BRPM

## 2025-01-03 DIAGNOSIS — Z17.1 MALIGNANT NEOPLASM OF UPPER-INNER QUADRANT OF LEFT BREAST IN FEMALE, ESTROGEN RECEPTOR NEGATIVE: Primary | ICD-10-CM

## 2025-01-03 DIAGNOSIS — C50.212 MALIGNANT NEOPLASM OF UPPER-INNER QUADRANT OF LEFT BREAST IN FEMALE, ESTROGEN RECEPTOR NEGATIVE: Primary | ICD-10-CM

## 2025-01-03 DIAGNOSIS — I10 ESSENTIAL HYPERTENSION: ICD-10-CM

## 2025-01-03 DIAGNOSIS — Z79.899 HIGH RISK MEDICATION USE: ICD-10-CM

## 2025-01-03 PROCEDURE — 99213 OFFICE O/P EST LOW 20 MIN: CPT | Performed by: INTERNAL MEDICINE

## 2025-01-03 NOTE — PROGRESS NOTES
Subjective   Bebe Kiser is a 59 y.o. female.  Referred by Dr. Holley for left breast invasive ductal carcinoma, triple negative    History of Present Illness     Patient is a 59-year-old postmenopausal -American lady who presented with a screen detected abnormality of the left breast.    She denies any family history of breast cancer.  No previous abnormal mammograms or biopsies.  Comorbidities include hypertension.    8/4/2022-bilateral screening mammogram  Platelets are almost entirely fatty.  There is a new small, oval mass measuring 8 mm with indistinct margins, associated fine linear calcifications in the middle one third region of the left breast at 9:00.  No suspicious abnormalities of the right breast.    8/19/2022-left breast diagnostic mammogram  The breast is almost entirely fatty.  Additional evaluation of the left breast at 9:00 there is a small irregular mass measuring 6 mm with indistinct margins and associated fine linear calcifications in the middle one third region of the left breast, 10 cm from the nipple.    Left breast ultrasound  Ultrasound demonstrates an irregular heterogeneous mass with indistinct margins measuring 5 x 5 x 4 mm in the middle third, 9:30 position of the left breast, 10 cm from the nipple    Impression  Ultrasound-guided biopsy recommended.    8/25/2022-ultrasound-guided biopsy  Pathology consistent with invasive ductal carcinoma  Grade 3  Tumor size 0.7 cm  No lymphovascular space invasion  ER negative  AR negative  HER2 1+  Ki-67 58.07%    9/8/2022-genetic testing ordered.  Positive for PAL B2 pathogenic mutation.    She was evaluated by Dr. Holley on 9/8/2022 and referred for discussion of neoadjuvant versus adjuvant therapy.    9/27/2022-bilateral breast MRI-biopsy-proven malignancy in the left breast at 10 o'clock position measures 0.8 cm.  0.6 cm ill-defined focus of enhancement 1 cm anterior to the biopsy-proven malignancy.  Mammographically microcalcifications  are seen.  Correlation with left breast biopsy or surgical excision of the calcifications is recommended.  No suspicious findings in the right breast.    10/18/2022-bilateral mastectomy  1.right breast simple skin sparing mastectomy-benign breast parenchyma with no significant changes.  2.left axillary sentinel lymph node 1-benign  3.left axillary sentinel lymph node 2-benign  4.left breast simple skin sparing mastectomy  A.invasive ductal carcinoma, poorly differentiated, grade 3  Tumor measures 8 mm  Margins are negative for invasive carcinoma  No lymphovascular space invasion  B.clip and biopsy site changes  Seen on unremarkable skin and nipple    Receptors repeated and confirmed to be ER negative, ND negative, HER2 1+ on immunohistochemistry  Ki-67 65%    Patient presents to the clinic today accompanied by her daughter to discuss adjuvant therapy recommendations.  She is recovering well from surgery.  No tissue expanders placed and plans to perform reconstruction following completion of chemotherapy    Her daughter underwent genetic testing and tested positive for PAL B2 mutation.    Ms. torres received 4 cycles of Taxotere and cyclophosphamide.  She tolerated treatment well.    Hysterectomy and bilateral oophorectomy performed in September 2023 and pathology was benign.    Interval Follow-up:  Ms. Torres is reviewed back today in 4-month follow-up.    She is any new complaints at this time.  No new chest wall lesions, no new bone pains, cough, abdominal pain nausea vomiting.  Overall doing well.      The following portions of the patient's history were reviewed and updated as appropriate: allergies, current medications, past family history, past medical history, past social history, past surgical history and problem list.    Past Medical History:   Diagnosis Date    At risk for sleep apnea     PATIENT STATES SHE HAS HAD A SLEEP STUDY AND IT WAS NEGATIVE    Breast cancer     LEFT    Elevated cholesterol      History of chemotherapy     Hypertension     Monoallelic mutation of PALB2 gene         Past Surgical History:   Procedure Laterality Date    BREAST SURGERY Left 10/18/2022    BIOPSY    BREAST TISSUE EXPANDER REMOVAL INSERTION OF IMPLANT Bilateral 06/15/2023    Procedure: BILATERAL DELAYED IMPLANT PLACEMENT WITH POSSIBLE ALLODERM AND REMOVE MEDIPORT;  Surgeon: Kevan Agustin MD;  Location: [facility];  Service: Plastics;  Laterality: Bilateral;    CATARACT EXTRACTION Bilateral 2019    COLONOSCOPY      2013    DIAGNOSTIC LAPAROSCOPY Bilateral 9/15/2023    Procedure: Davinci-assisted bilateral salpingo-oophorectomy with pelvic washings;  Surgeon: Tamika Sweeney MD;  Location: [facility];  Service: Robotics - DaVinci;  Laterality: Bilateral;    MASTECTOMY WITH SENTINEL NODE BIOPSY AND AXILLARY NODE DISSECTION Bilateral 10/18/2022    Procedure: bilateral skin-sparing mastectomy and left sentinel lymph node biopsy;  Surgeon: Manisha Holley MD;  Location: [facility];  Service: General;  Laterality: Bilateral;    VENOUS ACCESS DEVICE (PORT) INSERTION Right 10/18/2022    Procedure: Port placement;  Surgeon: Manisha Holley MD;  Location: [facility];  Service: General;  Laterality: Right;    VENOUS ACCESS DEVICE (PORT) REMOVAL          Family History   Problem Relation Age of Onset    Hypotension Mother     Asthma Mother     No Known Problems Father     Hypertension Brother     Breast cancer Neg Hx     Ovarian cancer Neg Hx     Uterine cancer Neg Hx     Colon cancer Neg Hx     Malig Hyperthermia Neg Hx         Social History     Socioeconomic History    Marital status:     Number of children: 3   Tobacco Use    Smoking status: Never     Passive exposure: Never    Smokeless tobacco: Never   Vaping Use    Vaping status: Never Used   Substance and Sexual Activity    Alcohol use: Not Currently    Drug use: No    Sexual activity: Not Currently     Partners: Male     Birth  "control/protection: None        OB History          2    Para   2    Term   2            AB        Living   2         SAB        IAB        Ectopic        Molar        Multiple        Live Births   2             Age at menarche-10   3 para 2  1  Age at first live childbirth-20  Age at menopause-47  All contraceptive pill use-30 years  Hormone replacement therapy-none    No Known Allergies     Review of Systems   Constitutional: Negative.    HENT: Negative.     Eyes: Negative.    Respiratory: Negative.     Cardiovascular: Negative.    Gastrointestinal: Negative.    Endocrine: Negative.    Genitourinary: Negative.    Musculoskeletal: Negative.    Allergic/Immunologic: Negative.    Neurological: Negative.    Hematological: Negative.    Psychiatric/Behavioral:  The patient is nervous/anxious.      Review of systems as mentioned HPI otherwise negative    Objective   Blood pressure 121/81, pulse 68, temperature 97.8 °F (36.6 °C), temperature source Oral, resp. rate 16, height 162 cm (63.78\"), weight 97.3 kg (214 lb 8 oz), SpO2 97%, not currently breastfeeding.     Physical Exam  Vitals reviewed.   Constitutional:       Appearance: Normal appearance.   HENT:      Head: Normocephalic and atraumatic.      Nose: Nose normal.      Mouth/Throat:      Mouth: Mucous membranes are moist.      Pharynx: Oropharynx is clear.   Eyes:      Conjunctiva/sclera: Conjunctivae normal.      Pupils: Pupils are equal, round, and reactive to light.   Cardiovascular:      Rate and Rhythm: Normal rate and regular rhythm.      Pulses: Normal pulses.      Heart sounds: Normal heart sounds.   Pulmonary:      Effort: Pulmonary effort is normal.      Breath sounds: Normal breath sounds.   Abdominal:      General: Abdomen is flat. Bowel sounds are normal.   Musculoskeletal:         General: Normal range of motion.      Cervical back: Normal range of motion.   Skin:     General: Skin is warm and dry.      Findings: No rash. "   Neurological:      General: No focal deficit present.      Mental Status: She is alert and oriented to person, place, and time.      Motor: No weakness.   Psychiatric:         Mood and Affect: Mood normal.         Behavior: Behavior normal.         Thought Content: Thought content normal.         Judgment: Judgment normal.       Breast Exam: Status post bilateral mastectomies with implants in place.  No chest wall changes.  No new palpable abnormalities.      I have reexamined the patient and the results are consistent with the previously documented exam. Alba Bernardo MD      No visits with results within 30 Day(s) from this visit.   Latest known visit with results is:   Office Visit on 06/25/2024   Component Date Value Ref Range Status    WBC 06/25/2024 8.90  3.40 - 10.80 10*3/mm3 Final    RBC 06/25/2024 4.03  3.77 - 5.28 10*6/mm3 Final    Hemoglobin 06/25/2024 12.2  12.0 - 15.9 g/dL Final    Hematocrit 06/25/2024 37.4  34.0 - 46.6 % Final    MCV 06/25/2024 92.8  79.0 - 97.0 fL Final    MCH 06/25/2024 30.3  26.6 - 33.0 pg Final    MCHC 06/25/2024 32.6  31.5 - 35.7 g/dL Final    RDW 06/25/2024 11.8 (L)  12.3 - 15.4 % Final    Platelets 06/25/2024 264  140 - 450 10*3/mm3 Final    Neutrophil Rel % 06/25/2024 65.2  42.7 - 76.0 % Final    Lymphocyte Rel % 06/25/2024 22.0  19.6 - 45.3 % Final    Monocyte Rel % 06/25/2024 8.1  5.0 - 12.0 % Final    Eosinophil Rel % 06/25/2024 3.7  0.3 - 6.2 % Final    Basophil Rel % 06/25/2024 0.6  0.0 - 1.5 % Final    Neutrophils Absolute 06/25/2024 5.80  1.70 - 7.00 10*3/mm3 Final    Lymphocytes Absolute 06/25/2024 1.96  0.70 - 3.10 10*3/mm3 Final    Monocytes Absolute 06/25/2024 0.72  0.10 - 0.90 10*3/mm3 Final    Eosinophils Absolute 06/25/2024 0.33  0.00 - 0.40 10*3/mm3 Final    Basophils Absolute 06/25/2024 0.05  0.00 - 0.20 10*3/mm3 Final    Immature Granulocyte Rel % 06/25/2024 0.4  0.0 - 0.5 % Final    Immature Grans Absolute 06/25/2024 0.04  0.00 - 0.05 10*3/mm3 Final     nRBC 06/25/2024 0.0  0.0 - 0.2 /100 WBC Final    Glucose 06/25/2024 94  65 - 99 mg/dL Final    BUN 06/25/2024 17  8 - 23 mg/dL Final    Creatinine 06/25/2024 0.83  0.57 - 1.00 mg/dL Final    EGFR Result 06/25/2024 80.3  >60.0 mL/min/1.73 Final    Comment: GFR Normal >60  Chronic Kidney Disease <60  Kidney Failure <15      BUN/Creatinine Ratio 06/25/2024 20.5  7.0 - 25.0 Final    Sodium 06/25/2024 137  136 - 145 mmol/L Final    Potassium 06/25/2024 4.6  3.5 - 5.2 mmol/L Final    Chloride 06/25/2024 98  98 - 107 mmol/L Final    Total CO2 06/25/2024 27.6  22.0 - 29.0 mmol/L Final    Calcium 06/25/2024 9.7  8.6 - 10.5 mg/dL Final    Total Protein 06/25/2024 7.0  6.0 - 8.5 g/dL Final    Albumin 06/25/2024 4.5  3.5 - 5.2 g/dL Final    Globulin 06/25/2024 2.5  gm/dL Final    A/G Ratio 06/25/2024 1.8  g/dL Final    Total Bilirubin 06/25/2024 0.3  0.0 - 1.2 mg/dL Final    Alkaline Phosphatase 06/25/2024 61  39 - 117 U/L Final    AST (SGOT) 06/25/2024 22  1 - 32 U/L Final    ALT (SGPT) 06/25/2024 21  1 - 33 U/L Final    Total Cholesterol 06/25/2024 207 (H)  0 - 200 mg/dL Final    Comment: Cholesterol Reference Ranges  (U.S. Department of Health and Human Services ATP III  Classifications)  Desirable          <200 mg/dL  Borderline High    200-239 mg/dL  High Risk          >240 mg/dL  Triglyceride Reference Ranges  (U.S. Department of Health and Human Services ATP III  Classifications)  Normal           <150 mg/dL  Borderline High  150-199 mg/dL  High             200-499 mg/dL  Very High        >500 mg/dL  HDL Reference Ranges  (U.S. Department of Health and Human Services ATP III  Classifications)  Low     <40 mg/dl (major risk factor for CHD)  High    >60 mg/dl ('negative' risk factor for CHD)  LDL Reference Ranges  (U.S. Department of Health and Human Services ATP III  Classifications)  Optimal          <100 mg/dL  Near Optimal     100-129 mg/dL  Borderline High  130-159 mg/dL  High             160-189 mg/dL  Very High         >189 mg/dL      Triglycerides 06/25/2024 99  0 - 150 mg/dL Final    HDL Cholesterol 06/25/2024 57  40 - 60 mg/dL Final    VLDL Cholesterol [place] 06/25/2024 18  5 - 40 mg/dL Final    LDL Chol Calc (NIH) 06/25/2024 132 (H)  0 - 100 mg/dL Final        No radiology results for the last 30 days.   Assessment & Plan       *Left breast invasive ductal carcinoma  Grade 3, ER/ND negative, HER2 1+ on immunohistochemistry, Ki-67 58%  Tumor measures 8 mm on mammogram and 5 mm on ultrasound  On the biopsy the tumor measures 0.7 cm.  MRI of the breast confirmed the tumor to be 0.8cm   Status post bilateral mastectomy on 10/18/2022.  Pathology consistent with invasive ductal carcinoma of the left breast measuring 8 mm, triple negative, grade 3, Ki-67 65%  pT1b N0 M0, stage Ia clinical, stage Ib prognostic  We discussed the benefit of chemotherapy and tumors which are over 5 mm and triple negative.  Given that the tumor is under 1 cm we will proceed with Taxotere and cyclophosphamide.  Cycle 1 TC administered 11/14/2022 1/16/2023 received fourth cycle of TC  Underwent reconstructive surgery 6/15/2023.  Also had port removed.  Continues without any evidence of recurrent disease  She is now 2 years out from completion of chemotherapy, we will plan to see her every 6 months going forward.    *Pathogenic PAL B2 mutation  We discussed the autosomal dominant inheritance of the PAL B2 mutation.  We also discussed the penetrance and the screening recommendations.  She thinks that her paternal grandmother might have had pancreatic cancer but unsure.  We discussed the increased risk of pancreatic cancer associated PAL B2 mutation.  Discussed screening recommendations of annual MRCP or EUS for pancreatic cancer starting at the age of 50.  Given that there is no clear first-degree relative with pancreatic cancer she would not meet guidelines recommendation for screening for pancreatic cancer.  We discussed the signs and symptoms of pancreatic  cancer including nausea, vomiting, epigastric pain and discomfort, abdominal distention, weight loss  She does not have a family history of ovarian cancer hence there is no clear indication for risk reducing salpingo-oophorectomy.  Recommend that she discuss this further with her GYN.   She plans to move her appointment up with her gynecologist.  Recommend that she at the least undergo transvaginal ultrasound and .   Discussed that the ovarian cancer risk is up to 5%.  Status post bilateral salpingo oophorectomy     *Hypertension  Continues Coreg 25 mg twice daily  BP: 121/81       *Obesity  Body mass index is 37.07 kg/m².  Patient is exercising and overall feeling better.    *Anemia  Resolved    PLAN:   ZEYAD on exam  APRN in 6 months and MD in 1 year      Alba Bernardo MD  01/03/25

## 2025-01-06 NOTE — OUTREACH NOTE
Call Center TCM Note    Flowsheet Row Responses   [facility], [place], operated by [facility] patient discharged from? [place]   Does the patient have one of the following disease processes/diagnoses(primary or secondary)? General Surgery   TCM attempt successful? Yes   Call start time 1056   Discharge diagnosis Port placement,   Bilateral skin-sparing mastectomy    Meds reviewed with patient/caregiver? Yes   Is the patient having any side effects they believe may be caused by any medication additions or changes? No   Does the patient have all medications related to this admission filled (includes all antibiotics, pain medications, etc.) Yes   Is the patient taking all medications as directed (includes completed medication regime)? Yes   Comments Pt will check in with PCP ofc when further along in recoveery.    Does the patient have an appointment with their PCP within 7 days of discharge? No   Nursing Interventions Patient desires to follow up with specialty only   Has home health visited the patient within 72 hours of discharge? N/A   Psychosocial issues? No   Did the patient receive a copy of their discharge instructions? Yes   Nursing interventions Reviewed instructions with patient   What is the patient's perception of their health status since discharge? Improving   Nursing interventions Nurse provided patient education   Is the patient /caregiver able to teach back basic post-op care? Continue use of incentive spirometry at least 1 week post discharge, Take showers only when approved by MD-sponge bathe until then, Do not remove steri-strips, Lifting as instructed by MD in discharge instructions, Practice 'cough and deep breath', No tub bath, swimming, or hot tub until instructed by MD, Keep incision areas clean,dry and protected, Drive as instructed by MD in discharge instructions   Is the patient/caregiver able to teach back signs and symptoms of incisional infection? Increased redness, swelling or pain at the incisonal site, Pus  Patient has an office visit scheduled today with Dr. Sexton  in primary care for a pre-op appointment for dental surgery.    RN instructed patient to ask about the Xarelto instructions for his dental procedure on 1/10/25.       RN called the dental team back and informed them the patient has a pre-op appointment today for his dental procedure on 1/10/25. Dr. Stanton's team would like a fax of the medication instructions for the Xarelto for 1/10/25 tooth extraction.    [facility], Dr. Stanton   Please fax : [phone]    ____________________________________________  Cardiology appears to be managing the Xarelto to treat Afib.   RN called Cardiology and they will provide instruction about the medication and inform patient and send the fax to the dental team.    Veronica Loaiza RN on 1/6/2025 at 10:56 AM       or odor from incision, Fever, Increased drainage or bleeding, Incisional warmth   Is the patient/caregiver able to teach back steps to recovery at home? Set small, achievable goals for return to baseline health, Practice good oral hygiene, Eat a well-balance diet, Rest and rebuild strength, gradually increase activity, Weigh daily, Make a list of questions for surgeon's appointment   If the patient is a current smoker, are they able to teach back resources for cessation? Not a smoker   Is the patient/caregiver able to teach back the hierarchy of who to call/visit for symptoms/problems? PCP, Specialist, Home health nurse, Urgent Care, ED, 911 Yes   TCM call completed? Yes   Wrap up additional comments D/C DX: Port placement,   Bilateral skin-sparing mastectomy - Pt doing well, minimal pain, no issues with drains. Good family support. All new rx's in place. First POST OP is 11/02/2022, pt will see Onclology same day. Pt will check in with PCP ofc when further along in recoveery.           Brenda Amor MA    10/20/2022, 11:04 EDT

## 2025-02-01 DIAGNOSIS — I10 ESSENTIAL HYPERTENSION: Chronic | ICD-10-CM

## 2025-02-04 RX ORDER — CARVEDILOL 25 MG/1
25 TABLET ORAL 2 TIMES DAILY WITH MEALS
Qty: 60 TABLET | Refills: 0 | Status: SHIPPED | OUTPATIENT
Start: 2025-02-04

## 2025-02-28 DIAGNOSIS — I10 ESSENTIAL HYPERTENSION: Chronic | ICD-10-CM

## 2025-03-02 RX ORDER — CARVEDILOL 25 MG/1
25 TABLET ORAL 2 TIMES DAILY WITH MEALS
Qty: 60 TABLET | Refills: 0 | OUTPATIENT
Start: 2025-03-02

## 2025-03-04 DIAGNOSIS — I10 ESSENTIAL HYPERTENSION: Chronic | ICD-10-CM

## 2025-03-08 RX ORDER — CARVEDILOL 25 MG/1
25 TABLET ORAL 2 TIMES DAILY WITH MEALS
Qty: 60 TABLET | Refills: 0 | Status: SHIPPED | OUTPATIENT
Start: 2025-03-08

## 2025-06-11 ENCOUNTER — OFFICE VISIT (OUTPATIENT)
Dept: FAMILY MEDICINE CLINIC | Facility: CLINIC | Age: 60
End: 2025-06-11
Payer: COMMERCIAL

## 2025-06-11 VITALS
TEMPERATURE: 98.2 F | BODY MASS INDEX: 35.65 KG/M2 | OXYGEN SATURATION: 97 % | DIASTOLIC BLOOD PRESSURE: 60 MMHG | HEIGHT: 64 IN | HEART RATE: 74 BPM | WEIGHT: 208.8 LBS | SYSTOLIC BLOOD PRESSURE: 120 MMHG

## 2025-06-11 DIAGNOSIS — R51.9 NOCTURNAL HEADACHES: ICD-10-CM

## 2025-06-11 DIAGNOSIS — M94.0 COSTOCHONDRITIS: ICD-10-CM

## 2025-06-11 DIAGNOSIS — R73.09 IMPAIRED GLUCOSE METABOLISM: ICD-10-CM

## 2025-06-11 DIAGNOSIS — R20.0 NUMBNESS AND TINGLING OF BOTH FEET: ICD-10-CM

## 2025-06-11 DIAGNOSIS — I10 ESSENTIAL HYPERTENSION: ICD-10-CM

## 2025-06-11 DIAGNOSIS — R06.83 LOUD SNORING: ICD-10-CM

## 2025-06-11 DIAGNOSIS — R20.2 NUMBNESS AND TINGLING OF RIGHT UPPER EXTREMITY: ICD-10-CM

## 2025-06-11 DIAGNOSIS — Z87.898 HISTORY OF HEADACHE: ICD-10-CM

## 2025-06-11 DIAGNOSIS — R20.2 NUMBNESS AND TINGLING OF BOTH FEET: ICD-10-CM

## 2025-06-11 DIAGNOSIS — R20.2 NUMBNESS AND TINGLING OF RIGHT LOWER EXTREMITY: ICD-10-CM

## 2025-06-11 DIAGNOSIS — I10 ESSENTIAL HYPERTENSION: Primary | Chronic | ICD-10-CM

## 2025-06-11 DIAGNOSIS — R20.0 NUMBNESS AND TINGLING OF RIGHT LOWER EXTREMITY: ICD-10-CM

## 2025-06-11 DIAGNOSIS — E78.2 MIXED HYPERLIPIDEMIA: ICD-10-CM

## 2025-06-11 DIAGNOSIS — R20.0 NUMBNESS AND TINGLING OF RIGHT UPPER EXTREMITY: ICD-10-CM

## 2025-06-11 LAB
EXPIRATION DATE: NORMAL
HBA1C MFR BLD: 5.7 % (ref 4.5–5.7)
Lab: NORMAL

## 2025-06-11 PROCEDURE — 99214 OFFICE O/P EST MOD 30 MIN: CPT | Performed by: NURSE PRACTITIONER

## 2025-06-11 PROCEDURE — 83036 HEMOGLOBIN GLYCOSYLATED A1C: CPT | Performed by: NURSE PRACTITIONER

## 2025-06-11 RX ORDER — CARVEDILOL 25 MG/1
12.5 TABLET ORAL 2 TIMES DAILY WITH MEALS
Start: 2025-06-11

## 2025-06-11 RX ORDER — LISINOPRIL 10 MG/1
10 TABLET ORAL DAILY
Qty: 90 TABLET | Refills: 1 | Status: SHIPPED | OUTPATIENT
Start: 2025-06-11

## 2025-06-11 RX ORDER — FOLIC ACID 1 MG/1
1 TABLET ORAL 2 TIMES DAILY
COMMUNITY
Start: 2025-04-25

## 2025-06-11 RX ORDER — ATORVASTATIN CALCIUM 10 MG/1
10 TABLET, FILM COATED ORAL NIGHTLY
COMMUNITY
Start: 2025-05-12

## 2025-06-11 NOTE — ASSESSMENT & PLAN NOTE
Hypertension is controlled on Coreg 25mg - 0.5 tab BID.  Continue current treatment plan.  Decrease table salt and foods high in salt.  Weight loss.  Increase activity daily.  [facility].    Orders:    carvedilol (COREG) 25 MG tablet; Take 0.5 tablets by mouth 2 (Two) Times a Day With Meals.    CBC & Differential    Comprehensive Metabolic Panel    Lipid Panel

## 2025-06-11 NOTE — ASSESSMENT & PLAN NOTE
Lipid abnormalities are uncontrolled on Atorvastatin 10mg nightly (Rx by [facility]).  Encouraged lifestyle changes.  Continue current treatment plan.  Will re-assess lipids today, followed by [facility].    Orders:    Comprehensive Metabolic Panel    Lipid Panel

## 2025-06-11 NOTE — PROGRESS NOTES
Chief Complaint  Headache, Numbness, and feet numbness    Subjective        HPI     MATTEO presents to [facility] with complaint of Headache, Numbness and Tingling in BLE and BLE feet and Chest Tightness:    History of Present Illness    Headache - She has been experiencing headaches predominantly at night, which persist into the morning and resolve later in the day. These headaches are described as mild, with a pain level of 3 to 4 out of 10, and are characterized by an aching sensation. The headaches are constant, occurring daily and nightly for the past 2 weeks. She reports no associated nausea, vomiting, fever, chills, vision changes, or sensitivity to light or sound. She has not taken any medication for these headaches. She maintains a regular sleep schedule but reports that the headaches disrupt her sleep. She also reports snoring but has no apneic episodes. A previous sleep apnea test conducted years ago was negative, and she was advised to lose weight due to her snoring. She has a history of headaches since childhood, which have previously disrupted her sleep. She was previously prescribed Excedrin for migraines, which she discontinued.    Numbness and Tingling sensation in RLE from Rt Knee to Right Foot, Bilateral Feet and RUE from Bicep to Finger Tips - She reports numbness and tingling in right lower leg and bilateral feet, initially on the right side but now also affecting the left foot. She describes the sensation as similar to her leg falling asleep after prolonged sitting with her leg bent under her buttock. The numbness and tingling are most pronounced in her toes, occasionally extending to the soles of her feet and her right leg from the knee down. She also experiences similar numbness and tingling sensations in her right arm, extending from the bicep to the fingertips, but not in her left arm. The tingling sensation preceded the onset of her headaches. She reports no  "back pain, injuries, or radiating pain from her lower back to her legs. She also confirmed no loss of bowel or bladder function, foot drop, weakness in her legs or arms, loss of sensation in her arms or legs, facial droop, slurred speech, difficulty swallowing, vision loss, nausea, vomiting, fever, chills, or ankle swelling. [facility] [address] [facility] [address].    Chest Wall Pain - She experiences intermittent chest tightness, which is localized to (pointed to) the right side of her mid sternal area. She reports no associated shortness of breath. She has been engaging in regular exercise, including walking 3 miles daily since 4/2025 without difficulty.    General Information:  She had a colonoscopy in 2/2025, which came back normal. She had her ovaries removed in 09/2023. She is not on any pills or infusions. She is on atorvastatin daily at night [facility]. She is on Coreg 1 tablet a day. She has been on Coreg for years. It was prescribed twice a day but was reduced to once a day due to low blood pressure. She takes half a tablet in the morning and half a tablet in the evening.    PAST SURGICAL HISTORY:  - Breast surgery and reconstruction in 2022  - Hysterectomy and ovary removal in 09/2023  - Colonoscopy in 02/2025    SOCIAL HISTORY  She does not smoke or drink alcohol.    FAMILY HISTORY  She reports a family history of high blood pressure.          Objective   Vital Signs:   Vitals:    06/11/25 1315   BP: 120/60   BP Location: Left arm   Patient Position: Sitting   Cuff Size: Large Adult   Pulse: 74   Temp: 98.2 °F (36.8 °C)   TempSrc: Oral   SpO2: 97%   Weight: 94.7 kg (208 lb 12.8 oz)   Height: 162.6 cm (64\")                Physical Exam  Vitals and nursing note reviewed.   Constitutional:       General: She is not in acute distress.     Appearance: Normal appearance. She is obese. She is not ill-appearing, toxic-appearing or diaphoretic.   HENT:      Head: " Normocephalic and atraumatic.      Nose: Nose normal.      Mouth/Throat:      Mouth: Mucous membranes are moist.   Eyes:      General:         Right eye: No discharge.         Left eye: No discharge.      Extraocular Movements: Extraocular movements intact.      Conjunctiva/sclera: Conjunctivae normal.      Pupils: Pupils are equal, round, and reactive to light.   Neck:      Vascular: No carotid bruit.   Cardiovascular:      Rate and Rhythm: Normal rate and regular rhythm.      Pulses:           Dorsalis pedis pulses are 2+ on the right side and 2+ on the left side.      Heart sounds: Normal heart sounds. No murmur heard.  Pulmonary:      Effort: Pulmonary effort is normal. No respiratory distress.      Breath sounds: Normal breath sounds. No wheezing, rhonchi or rales.   Musculoskeletal:         General: No swelling, tenderness, deformity or signs of injury.      Right shoulder: No swelling, deformity, effusion, tenderness or bony tenderness. Normal range of motion.      Right upper arm: No swelling, edema, deformity, lacerations, tenderness or bony tenderness.      Right elbow: Normal. No swelling, deformity, effusion or lacerations. Normal range of motion. No tenderness.      Right forearm: No swelling, edema, deformity, lacerations, tenderness or bony tenderness.      Right wrist: Normal. No swelling, deformity, effusion, lacerations, tenderness, bony tenderness or snuff box tenderness. Normal range of motion.      Right hand: Normal. No swelling, deformity, lacerations, tenderness or bony tenderness. Normal range of motion. Normal strength. Normal sensation.      Cervical back: Normal range of motion and neck supple. No rigidity, torticollis or tenderness. No pain with movement, spinous process tenderness or muscular tenderness.      Right lower leg: Normal. No swelling, deformity, lacerations, tenderness or bony tenderness. No edema.      Left lower leg: No swelling. No edema.      Right ankle: Normal.       Right Achilles Tendon: Normal.      Right foot: Normal.   Feet:      Right foot:      Protective Sensation: 10 sites tested.  10 sites sensed.      Skin integrity: Skin integrity normal.      Toenail Condition: Right toenails are normal.      Left foot:      Protective Sensation: 10 sites tested.  10 sites sensed.      Skin integrity: Skin integrity normal.      Toenail Condition: Left toenails are normal.   Lymphadenopathy:      Cervical: No cervical adenopathy.   Neurological:      General: No focal deficit present.      Mental Status: She is alert.      Sensory: No sensory deficit.      Motor: No weakness.      Coordination: Coordination normal.      Gait: Gait normal.      Comments: Normal strength bilateral upper/lower extremities; no foot drop.   Psychiatric:         Mood and Affect: Mood normal.         Behavior: Behavior normal.         Result Review :     The following data was reviewed by: STACY Bacon on 06/11/2025:      CBC & Differential (06/25/2024 10:04)  Comprehensive Metabolic Panel (06/25/2024 10:04)  Lipid Panel (06/25/2024 10:04)     Assessment and Plan    Assessment & Plan  Essential hypertension  Hypertension is controlled on Coreg 25mg - 0.5 tab BID.  Continue current treatment plan.  Decrease table salt and foods high in salt.  Weight loss.  Increase activity daily.  [facility]    Orders:    carvedilol (COREG) 25 MG tablet; Take 0.5 tablets by mouth 2 (Two) Times a Day With Meals.    CBC & Differential    Comprehensive Metabolic Panel    Lipid Panel    Mixed hyperlipidemia  Lipid abnormalities are uncontrolled on Atorvastatin 10mg nightly ([facility] by [facility]).  Encouraged lifestyle changes.  Continue current treatment plan.  Will re-assess lipids today, [facility] by [facility].    Orders:    Comprehensive Metabolic Panel    Lipid Panel    Nocturnal headaches  C/O nocturnal headaches for the past 2 weeks, untreated, w/hx nocturnal h/a as child controlled with Excedrin.  Drink  water to stay hydrated, minimize caffeine/ETOH.  Eat 3 balanced nutritional meals daily, avoid sugar and processed foods.  Activity as tolerated.  Order:  MRI w/ & w/o contrast (due to hx left breast ca).  Take (OTC) Ibuprofen 200mg - 1-3 tabs 2-3 times per day PRN pain (do not exceed TID use).  Discussed SE of medication; eat with med.  Recommended referral to Neurology for eval/treat and patient agreed.  Follow-up in 1 week for re-assessment nocturnal h/a.    Orders:    Ambulatory Referral to Neurology    MRI Brain With & Without Contrast; Future    Costochondritis  Avoid strenuous activities.  Apply ice (w/cloth barrier) to affected area.  Take (OTC) Ibuprofen 200mg - 1-3 tabs 2-3 times per day PRN pain (do not exceed TID).  Follow-up in 1 week to re-assess right sternal border pain.         Impaired glucose metabolism  Uncontrolled serum glucose (107) on prior lab.  Order:  POCT A1c result = POCT A1c 5.7 today.  Labs  Will assess for DM today.    Orders:    Comprehensive Metabolic Panel    POC Glycosylated Hemoglobin (Hb A1C)    Hemoglobin A1c    Loud snoring  C/O history of snoring, told to lose weight, never diagnosed with RAMIRO.  Patient admits continues to snore, denies apneic episodes.  Discussed referral to Sleep Medicine and patient agreed.    Orders:    Ambulatory Referral to Sleep Medicine    History of headache  C/O having nocturnal h/a's as a child, controlled with Excedrin.  Orders:    Ambulatory Referral to Neurology    MRI Brain With & Without Contrast; Future    Numbness and tingling of right upper extremity  C/O numbness and tingling in RUE from bicep to fingertips.  Rest from strenuous upper extremity activity.  Order:  EMG/Nerve Conduction Study  Order:  MRI w/ & w/o Contrast  Labs  Recommended referral to Neurology for eval/treat and patient agreed.    Orders:    Comprehensive Metabolic Panel    Vitamin B12    Folate    EMG & Nerve Conduction Test; Future    POC Glycosylated Hemoglobin (Hb  A1C)    TSH+Free T4    Hemoglobin A1c    Ambulatory Referral to Neurology    MRI Brain With & Without Contrast; Future    Numbness and tingling of right lower extremity  C/O numbness and tingling RLE from Rt knee to Rt foot.  Rest from strenuous activity.  Order:  EMG/Nerve Conduction Study  Order:  MRI w/ & w/o Contrast  Labs  Recommended referral to Neurology for eval/treat and patient agreed.    Orders:    Comprehensive Metabolic Panel    Vitamin B12    Folate    EMG & Nerve Conduction Test; Future    POC Glycosylated Hemoglobin (Hb A1C)    TSH+Free T4    Hemoglobin A1c    Ambulatory Referral to Neurology    MRI Brain With & Without Contrast; Future    Numbness and tingling of both feet  C/O numbness and tingling in bilateral feet.  Activity as tolerated.  Order:  EMG/Nerve Conduction Study  Order:  MRI w/ & w/o Contrast  Labs  Recommended referral to Neurology for eval/treat and patient agreed.    Orders:    Comprehensive Metabolic Panel    Vitamin B12    Folate    EMG & Nerve Conduction Test; Future    POC Glycosylated Hemoglobin (Hb A1C)    TSH+Free T4    Hemoglobin A1c    Ambulatory Referral to Neurology    MRI Brain With & Without Contrast; Future       Discussed red flags and when to seek emergency medical treatment.      Follow Up   Return in about 1 week (around 6/18/2025) for Next scheduled follow up - Heachaches, Costochontritis.  Patient was given instructions and counseling regarding her condition or for health maintenance advice. Please see specific information pulled into the AVS if appropriate.   Patient or patient representative verbalized consent for the use of Ambient Listening during the visit with  STACY Bacon for chart documentation. 6/11/25  13:11 EDT.

## 2025-06-12 LAB
ALBUMIN SERPL-MCNC: 4.6 G/DL (ref 3.9–4.9)
ALP SERPL-CCNC: 58 IU/L (ref 44–121)
ALT SERPL-CCNC: 15 IU/L (ref 0–32)
AST SERPL-CCNC: 18 IU/L (ref 0–40)
BASOPHILS # BLD AUTO: 0 X10E3/UL (ref 0–0.2)
BASOPHILS NFR BLD AUTO: 0 %
BILIRUB SERPL-MCNC: 0.3 MG/DL (ref 0–1.2)
BUN SERPL-MCNC: 14 MG/DL (ref 8–27)
BUN/CREAT SERPL: 18 (ref 12–28)
CALCIUM SERPL-MCNC: 10.3 MG/DL (ref 8.7–10.3)
CHLORIDE SERPL-SCNC: 100 MMOL/L (ref 96–106)
CHOLEST SERPL-MCNC: 181 MG/DL (ref 100–199)
CO2 SERPL-SCNC: 24 MMOL/L (ref 20–29)
CREAT SERPL-MCNC: 0.79 MG/DL (ref 0.57–1)
EGFRCR SERPLBLD CKD-EPI 2021: 85 ML/MIN/1.73
EOSINOPHIL # BLD AUTO: 0.2 X10E3/UL (ref 0–0.4)
EOSINOPHIL NFR BLD AUTO: 2 %
ERYTHROCYTE [DISTWIDTH] IN BLOOD BY AUTOMATED COUNT: 12.4 % (ref 11.7–15.4)
FOLATE SERPL-MCNC: >20 NG/ML
GLOBULIN SER CALC-MCNC: 2.6 G/DL (ref 1.5–4.5)
GLUCOSE SERPL-MCNC: 84 MG/DL (ref 70–99)
HBA1C MFR BLD: 5.6 % (ref 4.8–5.6)
HCT VFR BLD AUTO: 37.5 % (ref 34–46.6)
HDLC SERPL-MCNC: 60 MG/DL
HGB BLD-MCNC: 11.5 G/DL (ref 11.1–15.9)
IMM GRANULOCYTES # BLD AUTO: 0 X10E3/UL (ref 0–0.1)
IMM GRANULOCYTES NFR BLD AUTO: 0 %
LDLC SERPL CALC-MCNC: 104 MG/DL (ref 0–99)
LYMPHOCYTES # BLD AUTO: 2.3 X10E3/UL (ref 0.7–3.1)
LYMPHOCYTES NFR BLD AUTO: 23 %
MCH RBC QN AUTO: 30.2 PG (ref 26.6–33)
MCHC RBC AUTO-ENTMCNC: 30.7 G/DL (ref 31.5–35.7)
MCV RBC AUTO: 98 FL (ref 79–97)
MONOCYTES # BLD AUTO: 0.8 X10E3/UL (ref 0.1–0.9)
MONOCYTES NFR BLD AUTO: 8 %
NEUTROPHILS # BLD AUTO: 6.6 X10E3/UL (ref 1.4–7)
NEUTROPHILS NFR BLD AUTO: 67 %
PLATELET # BLD AUTO: 263 X10E3/UL (ref 150–450)
POTASSIUM SERPL-SCNC: 4.5 MMOL/L (ref 3.5–5.2)
PROT SERPL-MCNC: 7.2 G/DL (ref 6–8.5)
RBC # BLD AUTO: 3.81 X10E6/UL (ref 3.77–5.28)
SODIUM SERPL-SCNC: 139 MMOL/L (ref 134–144)
T4 FREE SERPL-MCNC: 1.09 NG/DL (ref 0.82–1.77)
TRIGL SERPL-MCNC: 93 MG/DL (ref 0–149)
TSH SERPL DL<=0.005 MIU/L-ACNC: 0.63 UIU/ML (ref 0.45–4.5)
VIT B12 SERPL-MCNC: 684 PG/ML (ref 232–1245)
VLDLC SERPL CALC-MCNC: 17 MG/DL (ref 5–40)
WBC # BLD AUTO: 10 X10E3/UL (ref 3.4–10.8)

## 2025-06-23 ENCOUNTER — OFFICE VISIT (OUTPATIENT)
Facility: HOSPITAL | Age: 60
End: 2025-06-23
Payer: COMMERCIAL

## 2025-06-23 VITALS
BODY MASS INDEX: 35.51 KG/M2 | DIASTOLIC BLOOD PRESSURE: 69 MMHG | HEIGHT: 64 IN | WEIGHT: 208 LBS | OXYGEN SATURATION: 99 % | SYSTOLIC BLOOD PRESSURE: 120 MMHG | HEART RATE: 70 BPM

## 2025-06-23 DIAGNOSIS — R06.83 LOUD SNORING: ICD-10-CM

## 2025-06-23 DIAGNOSIS — R51.9 MORNING HEADACHE: ICD-10-CM

## 2025-06-23 DIAGNOSIS — G47.30 OBSERVED SLEEP APNEA: ICD-10-CM

## 2025-06-23 DIAGNOSIS — G47.8 NON-RESTORATIVE SLEEP: ICD-10-CM

## 2025-06-23 DIAGNOSIS — E66.812 CLASS 2 OBESITY: ICD-10-CM

## 2025-06-23 DIAGNOSIS — G47.19 EXCESSIVE DAYTIME SLEEPINESS: ICD-10-CM

## 2025-06-23 DIAGNOSIS — R06.83 SNORING: ICD-10-CM

## 2025-06-23 PROBLEM — R73.09 IMPAIRED GLUCOSE METABOLISM: Status: ACTIVE | Noted: 2025-06-23

## 2025-06-23 PROBLEM — R20.2 NUMBNESS AND TINGLING OF BOTH FEET: Status: ACTIVE | Noted: 2025-06-23

## 2025-06-23 PROBLEM — Z87.898 HISTORY OF HEADACHE: Status: ACTIVE | Noted: 2025-06-23

## 2025-06-23 PROBLEM — R20.0 NUMBNESS AND TINGLING OF RIGHT UPPER EXTREMITY: Status: ACTIVE | Noted: 2025-06-23

## 2025-06-23 PROBLEM — R20.2 NUMBNESS AND TINGLING OF RIGHT UPPER EXTREMITY: Status: ACTIVE | Noted: 2025-06-23

## 2025-06-23 PROBLEM — R20.2 NUMBNESS AND TINGLING OF RIGHT LOWER EXTREMITY: Status: ACTIVE | Noted: 2025-06-23

## 2025-06-23 PROBLEM — R20.0 NUMBNESS AND TINGLING OF RIGHT LOWER EXTREMITY: Status: ACTIVE | Noted: 2025-06-23

## 2025-06-23 PROBLEM — M94.0 COSTOCHONDRITIS: Status: ACTIVE | Noted: 2025-06-23

## 2025-06-23 PROBLEM — R20.0 NUMBNESS AND TINGLING OF BOTH FEET: Status: ACTIVE | Noted: 2025-06-23

## 2025-06-23 PROCEDURE — G0463 HOSPITAL OUTPT CLINIC VISIT: HCPCS

## 2025-06-23 PROCEDURE — 99204 OFFICE O/P NEW MOD 45 MIN: CPT | Performed by: INTERNAL MEDICINE

## 2025-06-23 NOTE — ASSESSMENT & PLAN NOTE
C/O numbness and tingling in bilateral feet.  Activity as tolerated.  Order:  EMG/Nerve Conduction Study  Order:  MRI w/ & w/o Contrast  Labs  Recommended referral to Neurology for eval/treat and patient agreed.    Orders:    Comprehensive Metabolic Panel    Vitamin B12    Folate    EMG & Nerve Conduction Test; Future    POC Glycosylated Hemoglobin (Hb A1C)    TSH+Free T4    Hemoglobin A1c    Ambulatory Referral to Neurology    MRI Brain With & Without Contrast; Future

## 2025-06-23 NOTE — ASSESSMENT & PLAN NOTE
C/O numbness and tingling in RUE from bicep to fingertips.  Rest from strenuous upper extremity activity.  Order:  EMG/Nerve Conduction Study  Order:  MRI w/ & w/o Contrast  Labs  Recommended referral to Neurology for eval/treat and patient agreed.    Orders:    Comprehensive Metabolic Panel    Vitamin B12    Folate    EMG & Nerve Conduction Test; Future    POC Glycosylated Hemoglobin (Hb A1C)    TSH+Free T4    Hemoglobin A1c    Ambulatory Referral to Neurology    MRI Brain With & Without Contrast; Future

## 2025-06-23 NOTE — ASSESSMENT & PLAN NOTE
C/O numbness and tingling RLE from Rt knee to Rt foot.  Rest from strenuous activity.  Order:  EMG/Nerve Conduction Study  Order:  MRI w/ & w/o Contrast  Labs  Recommended referral to Neurology for eval/treat and patient agreed.    Orders:    Comprehensive Metabolic Panel    Vitamin B12    Folate    EMG & Nerve Conduction Test; Future    POC Glycosylated Hemoglobin (Hb A1C)    TSH+Free T4    Hemoglobin A1c    Ambulatory Referral to Neurology    MRI Brain With & Without Contrast; Future

## 2025-06-23 NOTE — ASSESSMENT & PLAN NOTE
C/O history of snoring, told to lose weight, never diagnosed with RAMIRO.  Patient admits continues to snore, denies apneic episodes.  Discussed referral to Sleep Medicine and patient agreed.    Orders:    Ambulatory Referral to Sleep Medicine

## 2025-06-23 NOTE — ASSESSMENT & PLAN NOTE
C/O nocturnal headaches for the past 2 weeks, untreated, w/hx nocturnal h/a as child controlled with Excedrin.  Drink water to stay hydrated, minimize caffeine/ETOH.  Eat 3 balanced nutritional meals daily, avoid sugar and processed foods.  Activity as tolerated.  Order:  MRI w/ & w/o contrast (due to hx left breast ca).  Take (OTC) Ibuprofen 200mg - 1-3 tabs 2-3 times per day PRN pain (do not exceed TID use).  Discussed SE of medication; eat with med.  Recommended referral to Neurology for eval/treat and patient agreed.  Follow-up in 1 week for re-assessment nocturnal h/a.    Orders:    Ambulatory Referral to Neurology    MRI Brain With & Without Contrast; Future

## 2025-06-23 NOTE — ASSESSMENT & PLAN NOTE
Avoid strenuous activities.  Apply ice (w/cloth barrier) to affected area.  Take (OTC) Ibuprofen 200mg - 1-3 tabs 2-3 times per day PRN pain (do not exceed TID).  Follow-up in 1 week to re-assess right sternal border pain.

## 2025-06-23 NOTE — ASSESSMENT & PLAN NOTE
C/O having nocturnal h/a's as a child, controlled with Excedrin.  Orders:    Ambulatory Referral to Neurology    MRI Brain With & Without Contrast; Future

## 2025-06-23 NOTE — PROGRESS NOTES
[facility]  Sleep Medicine   [address]  [address]  [place] [address]  Phone   Fax       Bebe Kiser  [phone]   1965  60 y.o.  female      Referring physician/provider and PCP Ysabel Coles APRN    Type of service: Initial Sleep Medicine Consult.  Date of service: 6/23/2025      Chief Complaint   Patient presents with    Witnessed Apnea    Snoring    Non-restorative Sleep    Fatigue    Dry Mouth    Daytime Sleepiness    Obesity       History of present illness;  Thank you for asking to see Bebe Kiser, 60 y.o.. The patient was seen today on 6/23/2025 at [facility].  The patient presents today with symptoms of snoring, non-restorative sleep and witnessed apneas. The symptoms are present for many years and they are persistent in nature.  The snoring is present in all positions and it is loud.  Patient has no prior surgery namely tonsillectomy, nasal surgery and UPPP.  She is a teacher in a pre-k school.  She also has morning headaches and has developed blood pressure    Patient gives the following sleep history.  Sleep schedule:  Bedtime: Midnight  Wake time: 6:30 AM  Normally takes about 10-15 minutes to fall asleep  Average hours of sleep 5-6  Number of naps per day 1  Symptoms   In addition to snoring, nonrestorative sleep and witnessed apneas patient gives the following associated symptoms.  Have you ever awakened gasping for breath, coughing, choking: Yes   Change in weight,  No   Morning headaches  Yes   Awaken with a sore throat or dry mouth  No   Leg jerking at night:  No   Crawly feeling/urge sensation to move in the legs: Yes   Teeth grinding:No   Have you ever awakened at night with a sour taste or burning sensation in your chest:  No   Do you have muscle weakness with laughing or anger or sleep paralysis:  No   Have you ever felt paralyzed while going to sleep or waking up:  No   Sleepwalking, nightmares, No   Nocturia  "(urination at night): 2 times per night  Memory Problem:No     Adapted from STOP-BANG Questionnaire  Mariscal F et al. Anesthesiology 2008;108:812-21.    Does the patient SNORE? Yes    Does the patient feel TIRED, fatigued or sleepy during the day? Yes    Has anyone OBSERVED the stop breathing or cough/gasp during sleep? Yes    Does the patient have high blood PRESSURE? Yes    Is the patient's BMI greater than 35? Yes  Body mass index is 35.69 kg/m².   Is the patient’s AGE over 50 years old? Yes  60 y.o.   Is the patient's NECK size greater than 17 in for a male and 16 in for a female? Yes  Neck Circumference: 17 inches   Is the patient’s GENDER male? No       Total \"Yes\" Responses; 7    0-2 \"Yes\" Responses = Low Risk of RAMIRO  3-4 \"Yes\" Responses = Intermediate Risk of RAMIRO  5-8 \"Yes\" Responses = High Risk RAMIRO    MEDICAL CONDITIONS (PMH)   Hypertension  Morning headache    Social history:  Do you drive a commercial vehicle:  No   Shift work:  No   Tobacco use:  No   Alcohol use: 0 per week  Caffeinated drinks: 3      Family Hx (parents and siblings) (pertaining to sleep medicine)  Sleep apnea, mother and brother    Medications: reviewed    Review of systems:  Positive symptoms are :  Snoring  Witnessed apnea  Daytime excessive sleepiness with [place] Sleepiness Scale of Total score: 11   Fatigue  Morning headache      Physical exam:  CONSTITUTINONAL:  Vitals:    06/23/25 0850   BP: 120/69   Pulse: 70   SpO2: 99%   Weight: 94.3 kg (208 lb)   Height: 162.6 cm (64.02\")    Body mass index is 35.69 kg/m².   NOSE:no nasal septal defects, nasal passages are clear, no nasal polyps,   THROAT: tonsils are nonenlarged, tongue normal size, oral airway Mallampati class 3  NECK:Neck Circumference: 17 inches, trachea is in the midline, thyroid not enlarged  RESPIRATORY SYSTEM: Breath sounds are normal, there are no wheezes  CARDIOVASULAR SYSTEM: Heart sounds are regular rhythm and normal rate, no edema  NEUROLOGICAL SYSTEM: Oriented x " 3, No speech defect, gait is normal  PSYCHIATRIC SYSTEM: Mood is normal, thought content is normal    Office notes from care team reviewed. Office note dated June 11, 2025,reviewed  Labs reviewed.  TSH Results:  TSH          6/11/2025    14:36   TSH   TSH 0.632       Most Recent A1C          6/11/2025    14:35 6/11/2025    14:36   HGBA1C Most Recent   Hemoglobin A1C 5.7  5.6         Assessment and plan:  Witnessed apneas,(R06.81) patient's symptoms and examination is consistent with sleep apnea (G47.30). I have talked to the patient about the signs and symptoms of sleep apnea. In addition, I have also discussed pathophysiology of sleep apnea.  I also discussed the complications of untreated sleep apnea including effects on hypertension, diabetes mellitus and nonrestorative sleep with hypersomnia which can increase risk for motor vehicle accidents.  Untreated sleep apnea is also a risk factor for development of atrial fibrillation, pulmonary hypertension, and insulin resistance and stroke.  Discussed in detail of various testing methods including home-based and lab based sleep studies.  Based on history and physical examination and other comorbidities the most appropriate study is home sleep test.  The order for the sleep study is [facility].  The test will be scheduled after approval from insurance. Treatment and management will be discussed after the test is completed.  Patient was given opportunity to ask questions and all the questions were answered.   Snoring (R06.83), snoring is the sound created by turbulent airflow vibrating upper airway soft tissue due to limitation of inspiratory airflow. I have also discussed factors affecting snoring including sleep deprivation, sleeping on the back and alcohol ingestion. To minimize snoring, patient is advised to have adequate sleep, sleep on the side and avoid alcohol and sedative medications before bedtime  Daytime excessive sleepiness .  It was assessed with  [place] Sleepiness Scale of Total score: 11.  There are many causes for daytime excessive sleepiness including sleep depression, shiftwork syndrome, depression and other medical disorders including heart, kidney and liver failure.  The most serious cause of excessive sleepiness is due to neurological conditions like narcolepsy/cataplexy.  But the most common cause of excessive sleepiness is due to sleep apnea with frequent awakenings during sleep time.  I have discussed safety of driving and to remain vigilant while driving.  Obesity 2, patient's BMI is Body mass index is 35.69 kg/m².. I have discussed the relationship between weight and sleep apnea.There is direct correlation between weight and severity of sleep apnea.  Weight reduction is encouraged, as it is going to reduce the severity of sleep apnea. I have also discussed with the patient diet and exercise to achieve ideal body weight.  Hypertension  Morning headache,    Return for 31 to 90 days after PAP setup with down load..  Patient's questions were answered      I once again thank you for asking me to see this patient in consultation and I have forwarded my opinion and treatment plan.  Please do not hesitate to call me if you have any questions.   6/23/2025  Ina Pope MD  Sleep Medicine  Medical Director  [facility]: [facility]

## 2025-06-23 NOTE — ASSESSMENT & PLAN NOTE
Uncontrolled serum glucose (107) on prior lab.  Order:  POCT A1c result = POCT A1c 5.7 today.  Labs  Will assess for DM today.    Orders:    Comprehensive Metabolic Panel    POC Glycosylated Hemoglobin (Hb A1C)    Hemoglobin A1c

## 2025-06-25 ENCOUNTER — OFFICE VISIT (OUTPATIENT)
Dept: FAMILY MEDICINE CLINIC | Facility: CLINIC | Age: 60
End: 2025-06-25
Payer: COMMERCIAL

## 2025-06-25 VITALS
HEIGHT: 64 IN | SYSTOLIC BLOOD PRESSURE: 120 MMHG | WEIGHT: 209.2 LBS | TEMPERATURE: 98.2 F | HEART RATE: 69 BPM | BODY MASS INDEX: 35.71 KG/M2 | OXYGEN SATURATION: 98 % | DIASTOLIC BLOOD PRESSURE: 60 MMHG

## 2025-06-25 DIAGNOSIS — I10 ESSENTIAL HYPERTENSION: Primary | ICD-10-CM

## 2025-06-25 DIAGNOSIS — R51.9 NOCTURNAL HEADACHES: ICD-10-CM

## 2025-06-25 DIAGNOSIS — M94.0 COSTOCHONDRITIS: ICD-10-CM

## 2025-06-25 PROCEDURE — 99213 OFFICE O/P EST LOW 20 MIN: CPT | Performed by: NURSE PRACTITIONER

## 2025-06-25 NOTE — ASSESSMENT & PLAN NOTE
Hypertension is controlled on Coreg 25mg - 0.5 tab BID.  Continue current treatment plan.  Decrease table salt and foods high in salt.  Weight loss.  Activity as tolerated.  [facility]

## 2025-06-25 NOTE — PROGRESS NOTES
Chief Complaint  Headache and Tingling feet (Follow-up/)    Subjective        HPI     MATTEO presents to [facility] for follow-up on Nocturnal Headache, Tingling in bilateral Feet/Right Arm and Costochondritis:    Nocturnal Headache/Tingling bilateral Feet & Right Arm - she has been taking Ibuprofen 200mg nightly.  She reports headache no longer wakes her up and does not have tingling in bilateral feet and right arm at night since started ibuprofen 200mg nightly.  Today, her headache pain feels like dull ache that sometimes makes her feel a little off balance, pain level 2/10 that is intermittent.  The tingling in her bilateral plantar feet is level 4/10 and is intermittent. The tingling in her right arm is level 1/10 and tingling in right hand is #2/10, and are both intermittent.  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  She denies repetitive motion of right arm.  She denies vision changes, nausea, vomiting, loss of sensation, weakness, or numbness.  She admits is able to exercise and work without difficulty  She admits that she use to take Excedrin in the past for these same headaches and it helped her.  Discussed switching over to Excedrin (as directed on package) for pain relief and she agreed.  Discussed not taking Ibuprofen while on Excedrin as these are both NSAID products and risk of GI bleed/rebound headaches.  Advised to take Excedrin as needed to prevent rebound h/a from chronic use.  She drinks 6-8oz water throughout the day.  She drinks 2-3 cups of coffee in the morning.  She has cut back on eating ice cream and is eating more salads.  She denies drinking soda and ETOH.    Costochondritis -  she reports her right lateral sternal pain has resolved after taking Ibuprofen.          Objective   Vital Signs:   Vitals:    06/25/25 0959   BP: 120/60   BP Location: Right arm   Patient Position: Sitting   Cuff Size: Adult   Pulse: 69  "  Temp: 98.2 °F (36.8 °C)   TempSrc: Oral   SpO2: 98%   Weight: 94.9 kg (209 lb 3.2 oz)   Height: 162.6 cm (64\")        Physical Exam  Vitals and nursing note reviewed.   Constitutional:       General: She is not in acute distress.     Appearance: Normal appearance. She is not ill-appearing.   HENT:      Head: Normocephalic and atraumatic.   Cardiovascular:      Rate and Rhythm: Normal rate and regular rhythm.      Pulses: Normal pulses.      Heart sounds: Normal heart sounds. No murmur heard.  Pulmonary:      Effort: Pulmonary effort is normal. No respiratory distress.      Breath sounds: Normal breath sounds. No wheezing.   Musculoskeletal:      Right shoulder: No swelling or effusion. Normal range of motion.      Right upper arm: No swelling or edema.      Right elbow: No swelling or effusion. Normal range of motion.      Right forearm: No swelling or edema.      Right hand: No swelling. Normal range of motion.      Cervical back: No swelling, edema, deformity, erythema, signs of trauma, lacerations, rigidity, spasms, torticollis, tenderness, bony tenderness or crepitus. No pain with movement. Normal range of motion.      Right lower leg: No swelling. No edema.      Left lower leg: No swelling. No edema.   Neurological:      General: No focal deficit present.      Mental Status: She is alert.      Motor: No weakness.      Gait: Gait normal.          Result Review :     The following data was reviewed by: STACY Bacon on 06/25/2025:      POC Glycosylated Hemoglobin (Hb A1C) (06/11/2025 14:35)  CBC & Differential (06/11/2025 14:36)  Comprehensive Metabolic Panel (06/11/2025 14:36)  Lipid Panel (06/11/2025 14:36)  Vitamin B12 (06/11/2025 14:36)  Folate (06/11/2025 14:36)  TSH+Free T4 (06/11/2025 14:36)  Hemoglobin A1c (06/11/2025 14:36)     Assessment and Plan    Assessment & Plan  Essential hypertension  Hypertension is controlled on Coreg 25mg - 0.5 tab BID.  Continue current treatment plan.  Decrease " table salt and foods high in salt.  Weight loss.  Activity as tolerated.  [facility].          Nocturnal headaches  Increase water intake to at least 8-8oz water daily.  Cut back/avoid caffeine/ETOH/high sugar intake.  Eat 3 nutritional meals daily.  Improving with Ibuprofen 200mg nightly. Patient reports Excedrin works better.  Patient switching from Ibuprofen 200mg nightly to (OTC) Excedrin (as directed on package) nightly PRN, as this has helped her in the past with similar headaches.  Discussed red flags and when to seek emergency medical care and she understood and agreed.  MRI scheduled for 8/7/25.  Patient has appointment to see Neurology on 10/30/25.  Encouraged patient attend appointment with Neurology.         Costochondritis  Resolved after taking Ibuprofen 200mg nightly.  Follow-up as needed.              Follow Up   Return in about 5 months (around 11/25/2025) for Annual physical.  Patient was given instructions and counseling regarding her condition or for health maintenance advice. Please see specific information pulled into the AVS if appropriate.

## 2025-06-25 NOTE — ASSESSMENT & PLAN NOTE
Increase water intake to at least 8-8oz water daily.  Cut back/avoid caffeine/ETOH/high sugar intake.  Eat 3 nutritional meals daily.  Improving with Ibuprofen 200mg nightly. Patient reports Excedrin works better.  Patient switching from Ibuprofen 200mg nightly to (OTC) Excedrin (as directed on package) nightly PRN, as this has helped her in the past with similar headaches.  Discussed red flags and when to seek emergency medical care and she understood and agreed.  MRI scheduled for 8/7/25.  Patient has appointment to see Neurology on 10/30/25.  Encouraged patient attend appointment with Neurology.

## 2025-07-07 ENCOUNTER — HOSPITAL ENCOUNTER (OUTPATIENT)
Dept: NEUROLOGY | Facility: HOSPITAL | Age: 60
Discharge: HOME | End: 2025-07-07
Payer: COMMERCIAL

## 2025-07-07 DIAGNOSIS — R20.0 NUMBNESS AND TINGLING OF RIGHT UPPER EXTREMITY: ICD-10-CM

## 2025-07-07 DIAGNOSIS — R20.0 NUMBNESS AND TINGLING OF RIGHT LOWER EXTREMITY: ICD-10-CM

## 2025-07-07 DIAGNOSIS — R20.0 NUMBNESS AND TINGLING OF BOTH FEET: ICD-10-CM

## 2025-07-07 DIAGNOSIS — R20.2 NUMBNESS AND TINGLING OF BOTH FEET: ICD-10-CM

## 2025-07-07 DIAGNOSIS — R20.2 NUMBNESS AND TINGLING OF RIGHT LOWER EXTREMITY: ICD-10-CM

## 2025-07-07 DIAGNOSIS — R20.2 NUMBNESS AND TINGLING OF RIGHT UPPER EXTREMITY: ICD-10-CM

## 2025-07-07 PROCEDURE — 95886 MUSC TEST DONE W/N TEST COMP: CPT | Performed by: PSYCHIATRY & NEUROLOGY

## 2025-07-07 PROCEDURE — 95912 NRV CNDJ TEST 11-12 STUDIES: CPT | Performed by: PSYCHIATRY & NEUROLOGY

## 2025-07-07 NOTE — PROCEDURES
EMG and Nerve Conduction Studies    I.      Instrument used: Neuromax 1002  II.     Please see data sheets for tabular summary of NCS and details on methods, temperatures and lab standards.   III.    EMG muscles tested for upper extremity studies include the deltoid, biceps, triceps, pronator teres, extensor digitorum communis, first dorsal interosseous and abductor pollicis brevis.    IV.   EMG muscles tested for lower extremity studies include the vastus lateralis, tibialis anterior, peroneus longus, medial gastrocnemius and extensor digitorum brevis.    V.    Additional muscles tested as needed.  Paraspinal muscles tested as needed.   VI.   Please see data sheets for tabular summary of EMG findings.   VII. The complete report includes the data sheets.      Indication: Tingling in the arms and legs  History: 60-year-old female with history of chemotherapy for cancer who has developed tingling in the arms legs hands and feet.  She notes more symptoms on the right in the arm and leg but also on the left.  She had a previous right carpal tunnel release many years ago.  She denies any neck pain.  She has some intermittent low back pain without radicular symptoms which responds to heat.  No history of diabetes or thyroid disease.      Ht: 64 inches  Wt: 208 pounds  HbA1C:   Lab Results   Component Value Date    HGBA1C 5.6 06/11/2025     TSH:   Lab Results   Component Value Date    TSH 0.632 06/11/2025       Technical summary:  Nerve conduction studies were obtained in both arms and in the right leg.  Skin temperatures were at least 32 °C measured on the palms.  The right lower extremity was cold and so required warming.  Needle examination was obtained on selected muscles in both arms and in the right leg.    Results:  1.  Prolonged median antidromic sensory distal latencies bilaterally at 5.6 ms on the left and 4.1 ms on the right with a low amplitude on the left at 12 µV but normal amplitude on the right.  2.  Normal  right ulnar antidromic sensory distal latency and amplitude.  3.  Prolonged median motor distal latencies bilaterally at 5.9 ms on the left and 4.9 ms on the right with normal conduction velocities and amplitudes.  4.  Slow ulnar motor conduction velocities in the short segments across the elbows at 40 m/s on the left and 41.7 m/s on the right with normal conduction velocities below the elbows.  Normal distal latencies and amplitudes bilaterally.  5.  Normal right sural sensory distal latency and amplitude.  6.  Normal right superficial peroneal sensory distal latency and amplitude.  7.  Normal right peroneal motor conduction velocities with a normal distal latency and amplitudes.  8.  Normal right tibial motor conduction velocity with normal distal latency along the medial plantar motor nerve.  Normal amplitude.  9.  Needle examination of selected muscles in both arms and in the right leg showed normal motor units throughout the muscles tested in the arms and legs.  There was question of increased number of large motor units in both abductor pollicis brevis muscles with essentially full recruitment.  All other muscles tested showed very normal-appearing motor units and recruitment patterns.    Impression:  Abnormal study showing bilateral median neuropathies at the wrists, moderate on the right and moderate to severe on the left.  In addition there are moderate bilateral ulnar neuropathies at the elbows.  This combination is consistent with a more diffuse peripheral neuropathy however there were no nerve conduction abnormalities in the right leg to further substantiate this.  There was no evidence of a cervical radiculopathy on either side or a right lumbosacral radiculopathy by this study.  Study results were discussed with the patient    Boris Mccann M.D.            Dictated utilizing Dragon dictation.

## 2025-08-07 ENCOUNTER — HOSPITAL ENCOUNTER (OUTPATIENT)
Dept: MRI IMAGING | Facility: HOSPITAL | Age: 60
Discharge: HOME | End: 2025-08-07
Admitting: NURSE PRACTITIONER
Payer: COMMERCIAL

## 2025-08-07 DIAGNOSIS — R20.0 NUMBNESS AND TINGLING OF RIGHT UPPER EXTREMITY: ICD-10-CM

## 2025-08-07 DIAGNOSIS — Z87.898 HISTORY OF HEADACHE: ICD-10-CM

## 2025-08-07 DIAGNOSIS — R20.2 NUMBNESS AND TINGLING OF BOTH FEET: ICD-10-CM

## 2025-08-07 DIAGNOSIS — R20.0 NUMBNESS AND TINGLING OF RIGHT LOWER EXTREMITY: ICD-10-CM

## 2025-08-07 DIAGNOSIS — R20.2 NUMBNESS AND TINGLING OF RIGHT LOWER EXTREMITY: ICD-10-CM

## 2025-08-07 DIAGNOSIS — R20.2 NUMBNESS AND TINGLING OF RIGHT UPPER EXTREMITY: ICD-10-CM

## 2025-08-07 DIAGNOSIS — R20.0 NUMBNESS AND TINGLING OF BOTH FEET: ICD-10-CM

## 2025-08-07 DIAGNOSIS — R51.9 NOCTURNAL HEADACHES: ICD-10-CM

## 2025-08-07 PROCEDURE — 25510000002 GADOBENATE DIMEGLUMINE 529 MG/ML SOLUTION: Performed by: NURSE PRACTITIONER

## 2025-08-07 PROCEDURE — A9577 INJ MULTIHANCE: HCPCS | Performed by: NURSE PRACTITIONER

## 2025-08-07 PROCEDURE — 70553 MRI BRAIN STEM W/O & W/DYE: CPT

## 2025-08-07 RX ADMIN — GADOBENATE DIMEGLUMINE 19 ML: 529 INJECTION, SOLUTION INTRAVENOUS at 19:25

## 2025-08-08 ENCOUNTER — HOSPITAL ENCOUNTER (OUTPATIENT)
Dept: SLEEP MEDICINE | Facility: HOSPITAL | Age: 60
Discharge: HOME | End: 2025-08-08
Admitting: INTERNAL MEDICINE
Payer: COMMERCIAL

## 2025-08-08 DIAGNOSIS — R06.83 SNORING: ICD-10-CM

## 2025-08-08 DIAGNOSIS — G47.8 NON-RESTORATIVE SLEEP: ICD-10-CM

## 2025-08-08 DIAGNOSIS — E66.812 CLASS 2 OBESITY: ICD-10-CM

## 2025-08-08 DIAGNOSIS — G47.30 OBSERVED SLEEP APNEA: ICD-10-CM

## 2025-08-08 DIAGNOSIS — R51.9 MORNING HEADACHE: ICD-10-CM

## 2025-08-08 DIAGNOSIS — G47.19 EXCESSIVE DAYTIME SLEEPINESS: ICD-10-CM

## 2025-08-08 PROCEDURE — G0399 HOME SLEEP TEST/TYPE 3 PORTA: HCPCS

## 2025-08-15 ENCOUNTER — TELEPHONE (OUTPATIENT)
Dept: SLEEP MEDICINE | Facility: HOSPITAL | Age: 60
End: 2025-08-15
Payer: COMMERCIAL

## 2025-08-15 DIAGNOSIS — R06.83 SNORING: ICD-10-CM

## 2025-08-15 DIAGNOSIS — G47.33 OSA (OBSTRUCTIVE SLEEP APNEA): Primary | ICD-10-CM

## 2025-08-19 ENCOUNTER — TELEPHONE (OUTPATIENT)
Dept: SURGERY | Facility: CLINIC | Age: 60
End: 2025-08-19
Payer: COMMERCIAL

## 2025-08-19 ENCOUNTER — OFFICE VISIT (OUTPATIENT)
Dept: SURGERY | Facility: CLINIC | Age: 60
End: 2025-08-19
Payer: COMMERCIAL

## 2025-08-19 VITALS
SYSTOLIC BLOOD PRESSURE: 118 MMHG | DIASTOLIC BLOOD PRESSURE: 82 MMHG | OXYGEN SATURATION: 98 % | WEIGHT: 208.4 LBS | RESPIRATION RATE: 16 BRPM | HEIGHT: 64 IN | HEART RATE: 74 BPM | BODY MASS INDEX: 35.58 KG/M2

## 2025-08-19 DIAGNOSIS — Z17.1 MALIGNANT NEOPLASM OF UPPER-INNER QUADRANT OF LEFT BREAST IN FEMALE, ESTROGEN RECEPTOR NEGATIVE: Primary | ICD-10-CM

## 2025-08-19 DIAGNOSIS — C50.212 MALIGNANT NEOPLASM OF UPPER-INNER QUADRANT OF LEFT BREAST IN FEMALE, ESTROGEN RECEPTOR NEGATIVE: Primary | ICD-10-CM

## 2025-08-19 PROCEDURE — 99213 OFFICE O/P EST LOW 20 MIN: CPT | Performed by: NURSE PRACTITIONER

## (undated) DEVICE — SUT VIC 3/0 TIES 18IN J110T

## (undated) DEVICE — NDL HYPO ECLPS SFTY 22G 1 1/2IN

## (undated) DEVICE — GLV SURG SENSICARE PI MIC PF SZ6.5 LF STRL

## (undated) DEVICE — SUT MNCRYL PLS ANTIB UD 4/0 PS2 18IN

## (undated) DEVICE — STCKNT IMPERV 9X36IN STRL

## (undated) DEVICE — PK ENT MAJ 40

## (undated) DEVICE — ADHS SKIN SURG TISS VISC PREMIERPRO EXOFIN HI/VISC FAST/DRY

## (undated) DEVICE — CVR TRANSD CIV FLX TPR 11.9 TO 3.8X61CM

## (undated) DEVICE — GOWN,SIRUS,NON REINFRCD,LARGE,SET IN SL: Brand: MEDLINE

## (undated) DEVICE — JACKSON-PRATT 100CC BULB RESERVOIR: Brand: CARDINAL HEALTH

## (undated) DEVICE — ENDOPATH XCEL BLADELESS TROCARS WITH STABILITY SLEEVES: Brand: ENDOPATH XCEL

## (undated) DEVICE — GLV SURG SENSICARE POLYISPRN W/ALOE PF LF 6.5 GRN STRL

## (undated) DEVICE — ENDOCUT SCISSOR TIP, DISPOSABLE: Brand: RENEW

## (undated) DEVICE — RUBBERBAND LF STRL PK/2

## (undated) DEVICE — SOL NS 500ML

## (undated) DEVICE — STPLR SKIN VISISTAT WD 35CT

## (undated) DEVICE — INTENDED FOR TISSUE SEPARATION, AND OTHER PROCEDURES THAT REQUIRE A SHARP SURGICAL BLADE TO PUNCTURE OR CUT.: Brand: BARD-PARKER ® STAINLESS STEEL BLADES

## (undated) DEVICE — TIP COVER ACCESSORY

## (undated) DEVICE — DEV TUNNELING SUBCONTANIOUS

## (undated) DEVICE — SUT PROLN 3/0 SH D/A 36IN 8522H

## (undated) DEVICE — CVR PROB GEN PURP W ISOSILK 6X48

## (undated) DEVICE — SYR LL TP 10ML STRL

## (undated) DEVICE — LOU LITHOTOMY ROBOTIC: Brand: MEDLINE INDUSTRIES, INC.

## (undated) DEVICE — GLV SURG BIOGEL LTX PF 6 1/2

## (undated) DEVICE — DECANTER BAG 9": Brand: MEDLINE INDUSTRIES, INC.

## (undated) DEVICE — TRAP FLD MINIVAC MEGADYNE 100ML

## (undated) DEVICE — DRP C/ARM 41X74IN

## (undated) DEVICE — LEGGINGS, PAIR, 31X48, STERILE: Brand: MEDLINE

## (undated) DEVICE — 3M™ IOBAN™ 2 ANTIMICROBIAL INCISE DRAPE 6640EZ: Brand: IOBAN™ 2

## (undated) DEVICE — PATIENT RETURN ELECTRODE, SINGLE-USE, CONTACT QUALITY MONITORING, ADULT, WITH 9FT CORD, FOR PATIENTS WEIGING OVER 33LBS. (15KG): Brand: MEGADYNE

## (undated) DEVICE — SEAL

## (undated) DEVICE — ANTIBACTERIAL UNDYED BRAIDED (POLYGLACTIN 910), SYNTHETIC ABSORBABLE SUTURE: Brand: COATED VICRYL

## (undated) DEVICE — VESSEL SEALER EXTEND: Brand: ENDOWRIST

## (undated) DEVICE — LAPAROVUE VISIBILITY SYSTEM LAPAROSCOPIC SOLUTIONS: Brand: LAPAROVUE

## (undated) DEVICE — Device

## (undated) DEVICE — ENDOPATH PNEUMONEEDLE INSUFFLATION NEEDLES WITH LUER LOCK CONNECTORS 120MM: Brand: ENDOPATH

## (undated) DEVICE — TOWEL,OR,DSP,ST,BLUE,STD,4/PK,20PK/CS: Brand: MEDLINE

## (undated) DEVICE — INTENDED FOR TISSUE SEPARATION, AND OTHER PROCEDURES THAT REQUIRE A SHARP SURGICAL BLADE TO PUNCTURE OR CUT.: Brand: BARD-PARKER ® CARBON RIB-BACK BLADES

## (undated) DEVICE — SUT SILK 2/0 SH 30IN K833H

## (undated) DEVICE — DRAPE,REIN 53X77,STERILE: Brand: MEDLINE

## (undated) DEVICE — BANDAGE,GAUZE,BULKEE II,4.5"X4.1YD,STRL: Brand: MEDLINE

## (undated) DEVICE — ELECTRD BLD EZ CLN MOD XLNG 2.75IN

## (undated) DEVICE — COLUMN DRAPE

## (undated) DEVICE — BLADELESS OBTURATOR: Brand: WECK VISTA

## (undated) DEVICE — ARM DRAPE

## (undated) DEVICE — TBG PENCL TELESCP MEGADYNE SMOKE EVAC 10FT

## (undated) DEVICE — LAPAROSCOPIC SMOKE FILTRATION SYSTEM: Brand: PALL LAPAROSHIELD® PLUS LAPAROSCOPIC SMOKE FILTRATION SYSTEM

## (undated) DEVICE — SOL ANTISTICK CAUTRY ELECTROLUBE LF

## (undated) DEVICE — APPL CHLORAPREP HI/LITE 26ML ORNG

## (undated) DEVICE — PK UNIV COMPL 40

## (undated) DEVICE — THE STERILE LIGHT HANDLE COVER IS USED WITH STERIS SURGICAL LIGHTING AND VISUALIZATION SYSTEMS.

## (undated) DEVICE — SOL NACL 0.9PCT 1000ML